# Patient Record
Sex: MALE | Race: WHITE | NOT HISPANIC OR LATINO | Employment: FULL TIME | URBAN - METROPOLITAN AREA
[De-identification: names, ages, dates, MRNs, and addresses within clinical notes are randomized per-mention and may not be internally consistent; named-entity substitution may affect disease eponyms.]

---

## 2017-01-04 ENCOUNTER — ANTICOAGULATION THERAPY VISIT (OUTPATIENT)
Dept: NURSING | Facility: CLINIC | Age: 62
End: 2017-01-04
Payer: COMMERCIAL

## 2017-01-04 DIAGNOSIS — Z79.01 LONG-TERM (CURRENT) USE OF ANTICOAGULANTS: Primary | ICD-10-CM

## 2017-01-04 DIAGNOSIS — I48.20 CHRONIC ATRIAL FIBRILLATION (H): ICD-10-CM

## 2017-01-04 LAB — INR POINT OF CARE: 1.7 (ref 0.86–1.14)

## 2017-01-04 PROCEDURE — 85610 PROTHROMBIN TIME: CPT | Mod: QW

## 2017-01-04 PROCEDURE — 99207 ZZC NO CHARGE NURSE ONLY: CPT

## 2017-01-04 PROCEDURE — 36416 COLLJ CAPILLARY BLOOD SPEC: CPT

## 2017-01-04 NOTE — MR AVS SNAPSHOT
Cameron Mariano   1/4/2017 7:15 AM   Anticoagulation Therapy Visit    Description:  61 year old male   Provider:  AN ANTI COAG   Department:  An Nurse           INR as of 1/4/2017     Selected INR 1.7! (1/4/2017)      Anticoagulation Summary as of 1/4/2017     INR goal 2.0-3.0   Selected INR 1.7! (1/4/2017)   Full instructions 1/4: 10 mg; Otherwise 7.5 mg every day   Next INR check 1/26/2017    Indications   Chronic atrial fibrillation (H) [I48.2]  Long-term (current) use of anticoagulants [Z79.01] [Z79.01]         Your next Anticoagulation Clinic appointment(s)     Jan 04, 2017  7:15 AM   Anticoagulation Visit with AN ANTI COAG   [facility] ([facility]    [address] [address] [phone]            Jan 26, 2017  7:15 AM   Anticoagulation Visit with AN ANTI COAG   [facility] ([facility])    [address] [address] [phone]              Contact Numbers     [facility]  Please call  [phone] to cancel and/or reschedule your appointment, or with any problems or questions regarding your therapy.        January 2017 Details    Sun Mon Tue Wed Thu Fri Sat     1               2               3               4      10 mg   See details      5      7.5 mg         6      7.5 mg         7      7.5 mg           8      7.5 mg         9      7.5 mg         10      7.5 mg         11      7.5 mg         12      7.5 mg         13      7.5 mg         14      7.5 mg           15      7.5 mg         16      7.5 mg         17      7.5 mg         18      7.5 mg         19      7.5 mg         20      7.5 mg         21      7.5 mg           22      7.5 mg         23      7.5 mg         24      7.5 mg         25      7.5 mg         26            27               28                 29               30               31                    Date Details   01/04 This INR check       Date of next INR:  1/26/2017         How to take  your warfarin dose     To take:  7.5 mg Take 1.5 of the 5 mg tablets.    To take:  10 mg Take 2 of the 5 mg tablets.

## 2017-01-04 NOTE — PROGRESS NOTES
ANTICOAGULATION FOLLOW-UP CLINIC VISIT    Patient Name:  Cameron Mariano  Date:  1/4/2017  Contact Type:  Face to Face    SUBJECTIVE:     Patient Findings     Positives Diet Changes    Comments Increased vit k foods in diet for wt loss and plans to continue. Dose adjusted today and weekly.           OBJECTIVE    INR PROTIME   Date Value Ref Range Status   01/04/2017 1.7* 0.86 - 1.14 Final       ASSESSMENT / PLAN  INR assessment SUB    Recheck INR In: 3 WEEKS    INR Location Clinic      Anticoagulation Summary as of 1/4/2017     INR goal 2.0-3.0   Selected INR 1.7! (1/4/2017)   Maintenance plan 7.5 mg (5 mg x 1.5) every day   Full instructions 1/4: 10 mg; Otherwise 7.5 mg every day   Weekly total 52.5 mg   Plan last modified Franchesca Jarvis RN (1/4/2017)   Next INR check 1/26/2017   Target end date     Indications   Chronic atrial fibrillation (H) [I48.2]  Long-term (current) use of anticoagulants [Z79.01] [Z79.01]         Anticoagulation Episode Summary     INR check location     Preferred lab     Send INR reminders to AN ANTICOAG CLINIC    Comments       Anticoagulation Care Providers     Provider Role Specialty Phone number    Pablo Benz MD [facility] [phone]            See the Encounter Report to view Anticoagulation Flowsheet and Dosing Calendar (Go to Encounters tab in chart review, and find the Anticoagulation Therapy Visit)        Franchesca Jarvis RN

## 2017-01-11 DIAGNOSIS — I10 HYPERTENSION GOAL BP (BLOOD PRESSURE) < 140/90: Primary | ICD-10-CM

## 2017-01-11 RX ORDER — DILTIAZEM HYDROCHLORIDE 240 MG/1
240 CAPSULE, COATED, EXTENDED RELEASE ORAL DAILY
Qty: 90 CAPSULE | Refills: 0 | Status: SHIPPED | OUTPATIENT
Start: 2017-01-11 | End: 2017-05-01

## 2017-01-11 RX ORDER — LISINOPRIL 2.5 MG/1
2.5 TABLET ORAL DAILY
Qty: 90 TABLET | Refills: 0 | Status: SHIPPED | OUTPATIENT
Start: 2017-01-11 | End: 2017-05-01

## 2017-01-26 ENCOUNTER — ANTICOAGULATION THERAPY VISIT (OUTPATIENT)
Dept: NURSING | Facility: CLINIC | Age: 62
End: 2017-01-26
Payer: COMMERCIAL

## 2017-01-26 DIAGNOSIS — I48.20 CHRONIC ATRIAL FIBRILLATION (H): ICD-10-CM

## 2017-01-26 DIAGNOSIS — Z79.01 LONG-TERM (CURRENT) USE OF ANTICOAGULANTS: Primary | ICD-10-CM

## 2017-01-26 LAB — INR POINT OF CARE: 4.9 (ref 0.86–1.14)

## 2017-01-26 PROCEDURE — 99207 ZZC NO CHARGE NURSE ONLY: CPT

## 2017-01-26 PROCEDURE — 36416 COLLJ CAPILLARY BLOOD SPEC: CPT

## 2017-01-26 PROCEDURE — 85610 PROTHROMBIN TIME: CPT | Mod: QW

## 2017-01-26 NOTE — MR AVS SNAPSHOT
Cameron Mariano   1/26/2017 7:15 AM   Anticoagulation Therapy Visit    Description:  61 year old male   Provider:  AN ANTI COAG   Department:  An Nurse           INR as of 1/26/2017     Selected INR 4.9! (1/26/2017)      Anticoagulation Summary as of 1/26/2017     INR goal 2.0-3.0   Selected INR 4.9! (1/26/2017)   Full instructions 1/27: Hold; 1/28: 5 mg; Otherwise 7.5 mg every day   Next INR check 2/2/2017    Indications   Chronic atrial fibrillation (H) [I48.2]  Long-term (current) use of anticoagulants [Z79.01] [Z79.01]         Your next Anticoagulation Clinic appointment(s)     Jan 26, 2017  7:15 AM   [facility] AN ANTI [facility]AG   [facility]    [address] [address] [phone]            Feb 02, 2017  7:15 AM   Anticoagulation Visit with AN ANTI COAG   [facility]    [address] [address] [phone]              Contact Numbers     [facility]  Please call  [phone] to cancel and/or reschedule your appointment, or with any problems or questions regarding your therapy.        January 2017 Details    Sun Mon Tue Wed Thu Fri Sat     1               2               3               4               5               6               7                 8               9               10               11               12               13               14                 15               16               17               18               19               20               21                 22               23               24               25               26      7.5 mg   See details      27      Hold         28      5 mg           29      7.5 mg         30      7.5 mg         31      7.5 mg              Date Details   01/26 This INR check               How to take your warfarin dose     To take:  5 mg Take 1 of the 5 mg tablets.    To take:  7.5 mg Take 1.5 of the 5 mg  tablets.    Hold Do not take your warfarin dose. See the Details table to the right for additional instructions.                February 2017 Details    Sun Mon Tue Wed Thu Fri Sat        1      7.5 mg         2            3               4                 5               6               7               8               9               10               11                 12               13               14               15               16               17               18                 19               20               21               22               23               24               25                 26               27               28                    Date Details   No additional details    Date of next INR:  2/2/2017         How to take your warfarin dose     To take:  7.5 mg Take 1.5 of the 5 mg tablets.

## 2017-01-26 NOTE — PROGRESS NOTES
ANTICOAGULATION FOLLOW-UP CLINIC VISIT    Patient Name:  Cameron Mariano  Date:  1/26/2017  Contact Type:  Face to Face    SUBJECTIVE:     Patient Findings     Positives Diet Changes, Other Complaints    Comments INR 4.9. URI cold symptoms and taking Nyquil . ot eating much. Sinus drainage. No bleeding or bruising. Took warfarin dose this morning. Will hold dose tomorrow. Add vit k food to diet today. Cut back on Nyquil. Call if concerns. Recheck in 1 wk.           OBJECTIVE    INR PROTIME   Date Value Ref Range Status   01/26/2017 4.9* 0.86 - 1.14 Final       ASSESSMENT / PLAN  INR assessment SUPRA    Recheck INR In: 1 WEEK    INR Location Clinic      Anticoagulation Summary as of 1/26/2017     INR goal 2.0-3.0   Selected INR 4.9! (1/26/2017)   Maintenance plan 7.5 mg (5 mg x 1.5) every day   Full instructions 1/27: Hold; 1/28: 5 mg; Otherwise 7.5 mg every day   Weekly total 52.5 mg   Plan last modified Franchesca Jarvis RN (1/4/2017)   Next INR check 2/2/2017   Target end date     Indications   Chronic atrial fibrillation (H) [I48.2]  Long-term (current) use of anticoagulants [Z79.01] [Z79.01]         Anticoagulation Episode Summary     INR check location     Preferred lab     Send INR reminders to AN ANTICOAG CLINIC    Comments       Anticoagulation Care Providers     Provider Role Specialty Phone number    Pablo Benz MD [facility] [phone]            See the Encounter Report to view Anticoagulation Flowsheet and Dosing Calendar (Go to Encounters tab in chart review, and find the Anticoagulation Therapy Visit)        Franchesca Jarvis RN

## 2017-02-02 ENCOUNTER — ANTICOAGULATION THERAPY VISIT (OUTPATIENT)
Dept: NURSING | Facility: CLINIC | Age: 62
End: 2017-02-02
Payer: COMMERCIAL

## 2017-02-02 ENCOUNTER — TELEPHONE (OUTPATIENT)
Dept: FAMILY MEDICINE | Facility: CLINIC | Age: 62
End: 2017-02-02

## 2017-02-02 DIAGNOSIS — I48.20 CHRONIC ATRIAL FIBRILLATION (H): Primary | ICD-10-CM

## 2017-02-02 DIAGNOSIS — I48.20 CHRONIC ATRIAL FIBRILLATION (H): ICD-10-CM

## 2017-02-02 DIAGNOSIS — Z79.01 LONG-TERM (CURRENT) USE OF ANTICOAGULANTS: Primary | ICD-10-CM

## 2017-02-02 LAB — INR POINT OF CARE: 2.8 (ref 0.86–1.14)

## 2017-02-02 PROCEDURE — 85610 PROTHROMBIN TIME: CPT | Mod: QW

## 2017-02-02 PROCEDURE — 36416 COLLJ CAPILLARY BLOOD SPEC: CPT

## 2017-02-02 PROCEDURE — 99207 ZZC NO CHARGE NURSE ONLY: CPT

## 2017-02-02 NOTE — MR AVS SNAPSHOT
Cameron Mariano   2/2/2017 7:15 AM   Anticoagulation Therapy Visit    Description:  61 year old male   Provider:  AN ANTI COAG   Department:  An Nurse           INR as of 2/2/2017     Selected INR 2.8 (2/2/2017)      Anticoagulation Summary as of 2/2/2017     INR goal 2.0-3.0   Selected INR 2.8 (2/2/2017)   Full instructions 7.5 mg every day   Next INR check 3/2/2017    Indications   Chronic atrial fibrillation (H) [I48.2]  Long-term (current) use of anticoagulants [Z79.01] [Z79.01]         Your next Anticoagulation Clinic appointment(s)     Feb 02, 2017  7:15 AM   Anticoagulation Visit with AN ANTI COAG   [facility]    [address] [address] [phone]            Mar 02, 2017  7:15 AM   Anticoagulation Visit with AN ANTI COAG   [facility])    [address] [address] [phone]              Contact Numbers     [facility]  Please call  [phone] to cancel and/or reschedule your appointment, or with any problems or questions regarding your therapy.        February 2017 Details    Sun Mon Tue Wed Thu Fri Sat        1               2      7.5 mg   See details      3      7.5 mg         4      7.5 mg           5      7.5 mg         6      7.5 mg         7      7.5 mg         8      7.5 mg         9      7.5 mg         10      7.5 mg         11      7.5 mg           12      7.5 mg         13      7.5 mg         14      7.5 mg         15      7.5 mg         16      7.5 mg         17      7.5 mg         18      7.5 mg           19      7.5 mg         20      7.5 mg         21      7.5 mg         22      7.5 mg         23      7.5 mg         24      7.5 mg         25      7.5 mg           26      7.5 mg         27      7.5 mg         28      7.5 mg              Date Details   02/02 This INR check               How to take your warfarin dose     To take:  7.5 mg Take 1.5 of the 5 mg  tablets.           March 2017 Details    Sun Mon Tue Wed Thu Fri Sat        1      7.5 mg         2            3               4                 5               6               7               8               9               10               11                 12               13               14               15               16               17               18                 19               20               21               22               23               24               25                 26               27               28               29               30               31                 Date Details   No additional details    Date of next INR:  3/2/2017         How to take your warfarin dose     To take:  7.5 mg Take 1.5 of the 5 mg tablets.

## 2017-02-02 NOTE — PROGRESS NOTES
ANTICOAGULATION FOLLOW-UP CLINIC VISIT    Patient Name:  Cameron Mariano  Date:  2/2/2017  Contact Type:  Face to Face    SUBJECTIVE:     Patient Findings     Comments Therapeutic after dose adjusted from last week. Cold symptoms gone and stopped taking nyquil. Resume previous weekly dose.            OBJECTIVE    INR PROTIME   Date Value Ref Range Status   02/02/2017 2.8* 0.86 - 1.14 Final       ASSESSMENT / PLAN  INR assessment THER    Recheck INR In: 4 WEEKS    INR Location Clinic      Anticoagulation Summary as of 2/2/2017     INR goal 2.0-3.0   Selected INR 2.8 (2/2/2017)   Maintenance plan 7.5 mg (5 mg x 1.5) every day   Full instructions 7.5 mg every day   Weekly total 52.5 mg   No change documented Franchesca Jarvis RN   Plan last modified Franchesca Jarvis RN (1/4/2017)   Next INR check 3/2/2017   Target end date     Indications   Chronic atrial fibrillation (H) [I48.2]  Long-term (current) use of anticoagulants [Z79.01] [Z79.01]         Anticoagulation Episode Summary     INR check location     Preferred lab     Send INR reminders to AN ANTICOAG CLINIC    Comments       Anticoagulation Care Providers     Provider Role Specialty Phone number    Pablo Benz MD [facility] [phone]            See the Encounter Report to view Anticoagulation Flowsheet and Dosing Calendar (Go to Encounters tab in chart review, and find the Anticoagulation Therapy Visit)        Franchesca Jarvis RN

## 2017-02-07 ENCOUNTER — OFFICE VISIT (OUTPATIENT)
Dept: FAMILY MEDICINE | Facility: CLINIC | Age: 62
End: 2017-02-07
Payer: COMMERCIAL

## 2017-02-07 VITALS
BODY MASS INDEX: 26.83 KG/M2 | RESPIRATION RATE: 15 BRPM | DIASTOLIC BLOOD PRESSURE: 70 MMHG | WEIGHT: 187 LBS | SYSTOLIC BLOOD PRESSURE: 111 MMHG | OXYGEN SATURATION: 98 % | TEMPERATURE: 98.1 F | HEART RATE: 87 BPM

## 2017-02-07 DIAGNOSIS — J01.90 ACUTE SINUSITIS WITH SYMPTOMS > 10 DAYS: Primary | ICD-10-CM

## 2017-02-07 PROCEDURE — 99213 OFFICE O/P EST LOW 20 MIN: CPT | Performed by: NURSE PRACTITIONER

## 2017-02-07 ASSESSMENT — PAIN SCALES - GENERAL: PAINLEVEL: MILD PAIN (3)

## 2017-02-07 NOTE — PROGRESS NOTES
SUBJECTIVE:                                                    Cameron Mariano is a 61 year old male who presents to clinic today for the following health issues:      RESPIRATORY SYMPTOMS      Duration: 10 days    Description  nasal congestion, rhinorrhea, facial pain/pressure, cough and post nasal drainage     Severity: moderate    Accompanying signs and symptoms: None    History (predisposing factors):  none    Precipitating or alleviating factors: None    Therapies tried and outcome:  Nyquil           Problem list and histories reviewed & adjusted, as indicated.  Additional history: as documented    Problem list, Medication list, Allergies, and Medical/Social/Surgical histories reviewed in EPIC and updated as appropriate.    ROS:  Constitutional, HEENT, cardiovascular, pulmonary, gi and gu systems are negative, except as otherwise noted.    OBJECTIVE:                                                    /70 mmHg  Pulse 87  Temp(Src) 98.1  F (36.7  C) (Oral)  Resp 15  Wt 187 lb (84.823 kg)  SpO2 98%  Body mass index is 26.83 kg/(m^2).  GENERAL: healthy, alert and no distress  EYES: Eyes grossly normal to inspection, PERRL and conjunctivae and sclerae normal  HENT: normal cephalic/atraumatic, ear canals and TM's normal, nasal mucosa edematous , rhinorrhea yellow, oropharynx clear, oral mucous membranes moist and sinuses: maxillary tenderness on bilaterally  NECK: no adenopathy, no asymmetry, masses, or scars and thyroid normal to palpation  RESP: lungs clear to auscultation - no rales, rhonchi or wheezes  CV: regular rate and rhythm, normal S1 S2, no S3 or S4, no murmur, click or rub, no peripheral edema and peripheral pulses strong  ABDOMEN: soft, nontender, no hepatosplenomegaly, no masses and bowel sounds normal  MS: no gross musculoskeletal defects noted, no edema  SKIN: no suspicious lesions or rashes  NEURO: Normal strength and tone, mentation intact and speech normal  PSYCH: mentation appears  normal, affect normal/bright    Diagnostic Test Results:  none      ASSESSMENT/PLAN:                                                      1. Acute sinusitis with symptoms > 10 days    - amoxicillin-clavulanate (AUGMENTIN) 875-125 MG per tablet; Take 1 tablet by mouth 2 times daily  Dispense: 20 tablet; Refill: 0    See Patient Instructions  Patient Instructions       Acute Bacterial Rhinosinusitis (ABRS)  Acute bacterial rhinosinusitis (ABRS) is an infection of your nasal cavity and sinuses. It s caused by bacteria. Acute means that you ve had symptoms for less than 12 weeks.  Understanding your sinuses  The nasal cavity is the large air-filled space behind your nose. The sinuses are a group of spaces formed by the bones of your face. They connect with your nasal cavity. ABRS causes the tissue lining these spaces to become inflamed. Mucus may not drain normally. This leads to facial pain and other symptoms.  What causes ABRS?  ABRS most often follows an upper respiratory infection caused by a virus. Bacteria then infect the lining of your nasal cavity and sinuses. But you can also get ABRS if you have:    Nasal allergies    Long-term nasal swelling and congestion not caused by allergies    Blockage in the nose  Symptoms of ABRS  The symptoms of ABRS may be different for each person, and can include:    Nasal congestion    Runny nose    Fluid draining from the nose down the throat (postnasal drip)    Headache    Cough    Pain in the sinuses    Thick, colored fluid from the nose (mucus)    Fever  Diagnosing ABRS  ABRS may be diagnosed if you ve had an upper respiratory infection like a cold and cough for longer than 10 to 14 days. Your health care provider will ask about your symptoms and your medical history. The provider will check your vital signs, including your temperature. You ll have a physical exam. The health care provider will check your ears, nose, and throat. You likely won t need any tests. If ABRS comes  back, you may have a culture or other tests.  Treatment for ABRS  Treatment may include:    Antibiotic medicine. This is for symptoms that last for at least 10 to 14 days.    Nasal corticosteroid medicine. Drops or spray used in the nose can lessen swelling and congestion.    Over-the-counter pain medicine. This is to lessen sinus pain and pressure.    Nasal decongestant medicine. Spray or drops may help to lessen congestion. Do not use them for more than a few days.    Salt wash (saline irrigation). This can help to loosen mucus.  Possible complications of ABRS  ABRS may come back or become long-term (chronic).  In rare cases, ABRS may cause complications such as:     Inflamed tissue around the brain and spinal cord (meningitis)    Inflamed tissue around the eyes (orbital cellulitis)    Inflamed bones around the sinuses (osteitis)  These problems may need to be treated in a hospital with intravenous (IV) antibiotic medicine or surgery.  When to call the health care provider  Call your health care provider if you have any of the following:    Symptoms that don t get better, or get worse    Symptoms that don t get better after 3 to 5 days on antibiotics    Trouble seeing    Swelling around your eyes    Confusion or trouble staying awake        [id number] [employer]. [address] [place] [address], [place] [address]. All rights reserved. This information is not intended as a substitute for professional medical care. Always follow your healthcare professional's instructions.              TRINO Yoder [facility]

## 2017-02-07 NOTE — MR AVS SNAPSHOT
After Visit Summary   2/7/2017    Cameron Mariano    MRN: [id number]           Patient Information     Date Of Birth          1955        Visit Information        Provider Department      2/7/2017 3:20 PM Sonia Kumar APRN [facility]        Today's Diagnoses     Acute sinusitis with symptoms > 10 days    -  1       Care Instructions      Acute Bacterial Rhinosinusitis (ABRS)  Acute bacterial rhinosinusitis (ABRS) is an infection of your nasal cavity and sinuses. It s caused by bacteria. Acute means that you ve had symptoms for less than 12 weeks.  Understanding your sinuses  The nasal cavity is the large air-filled space behind your nose. The sinuses are a group of spaces formed by the bones of your face. They connect with your nasal cavity. ABRS causes the tissue lining these spaces to become inflamed. Mucus may not drain normally. This leads to facial pain and other symptoms.  What causes ABRS?  ABRS most often follows an upper respiratory infection caused by a virus. Bacteria then infect the lining of your nasal cavity and sinuses. But you can also get ABRS if you have:    Nasal allergies    Long-term nasal swelling and congestion not caused by allergies    Blockage in the nose  Symptoms of ABRS  The symptoms of ABRS may be different for each person, and can include:    Nasal congestion    Runny nose    Fluid draining from the nose down the throat (postnasal drip)    Headache    Cough    Pain in the sinuses    Thick, colored fluid from the nose (mucus)    Fever  Diagnosing ABRS  ABRS may be diagnosed if you ve had an upper respiratory infection like a cold and cough for longer than 10 to 14 days. Your health care provider will ask about your symptoms and your medical history. The provider will check your vital signs, including your temperature. You ll have a physical exam. The health care provider will check your ears, nose, and throat. You likely won t need any tests. If  ABRS comes back, you may have a culture or other tests.  Treatment for ABRS  Treatment may include:    Antibiotic medicine. This is for symptoms that last for at least 10 to 14 days.    Nasal corticosteroid medicine. Drops or spray used in the nose can lessen swelling and congestion.    Over-the-counter pain medicine. This is to lessen sinus pain and pressure.    Nasal decongestant medicine. Spray or drops may help to lessen congestion. Do not use them for more than a few days.    Salt wash (saline irrigation). This can help to loosen mucus.  Possible complications of ABRS  ABRS may come back or become long-term (chronic).  In rare cases, ABRS may cause complications such as:     Inflamed tissue around the brain and spinal cord (meningitis)    Inflamed tissue around the eyes (orbital cellulitis)    Inflamed bones around the sinuses (osteitis)  These problems may need to be treated in a hospital with intravenous (IV) antibiotic medicine or surgery.  When to call the health care provider  Call your health care provider if you have any of the following:    Symptoms that don t get better, or get worse    Symptoms that don t get better after 3 to 5 days on antibiotics    Trouble seeing    Swelling around your eyes    Confusion or trouble staying awake        8813-7457 [employer]. [address] [place] [address]. All rights reserved. This information is not intended as a substitute for professional medical care. Always follow your healthcare professional's instructions.              Follow-ups after your visit        Your next 10 appointments already scheduled     Mar 02, 2017  7:15 AM   Anticoagulation Visit with AN ANTI COAG   [facility] ([facility]    [address] [facility] [address] [phone]              Who to contact     If you have questions or need follow up information about today's clinic visit or your schedule please contact [facility]  [place] directly at [phone].  Normal or non-critical lab and imaging results will be communicated to you by [employer], letter or phone within 4 business days after the clinic has received the results. If you do not hear from us within 7 days, please contact the clinic through [employer] or phone. If you have a critical or abnormal lab result, we will notify you by phone as soon as possible.  Submit refill requests through [employer] or call your pharmacy and they will forward the refill request to us. Please allow 3 business days for your refill to be completed.          Additional Information About Your Visit        [employer] Information     [employer] gives you secure access to your electronic health record. If you see a primary care provider, you can also send messages to your care team and make appointments. If you have questions, please call your primary care clinic.  If you do not have a primary care provider, please call [phone] and they will assist you.        Care EveryWhere ID     This is your Care EveryWhere ID. This could be used by other organizations to access your [facility] medical records  [id number]        Your Vitals Were     Pulse Temperature Respirations Pulse Oximetry          87 98.1  F (36.7  C) (Oral) 15 98%         Blood Pressure from Last 3 Encounters:   02/07/17 111/70   09/08/16 134/82   12/14/15 118/78    Weight from Last 3 Encounters:   02/07/17 187 lb (84.823 kg)   09/08/16 183 lb (83.008 kg)   12/14/15 185 lb (83.915 kg)              Today, you had the following     No orders found for display         Today's Medication Changes          These changes are accurate as of: 2/7/17  3:46 PM.  If you have any questions, ask your nurse or doctor.               Start taking these medicines.        Dose/Directions    amoxicillin-clavulanate 875-125 MG per tablet   Commonly known as:  AUGMENTIN   Used for:  Acute sinusitis with symptoms > 10 days   Started by:  Sonia Kumar APRN CNP         Dose:  1 tablet   Take 1 tablet by mouth 2 times daily   Quantity:  20 tablet   Refills:  0            Where to get your medicines      These medications were sent to [facility] - [place], [place] - [address] [facility]  [address] [facility] [address]     Phone:  [phone]    - amoxicillin-clavulanate 875-125 MG per tablet             Primary Care Provider Office Phone # Fax #    Pablo Benz [phone]       [facility] [address] [facility] [address]        Thank you!     Thank you for choosing [facility]  for your care. Our goal is always to provide you with excellent care. Hearing back from our patients is one way we can continue to improve our services. Please take a few minutes to complete the written survey that you may receive in the mail after your visit with us. Thank you!             Your Updated Medication List - Protect others around you: Learn how to safely use, store and throw away your medicines at www.disposemymeds.org.          This list is accurate as of: 2/7/17  3:46 PM.  Always use your most recent med list.                   Brand Name Dispense Instructions for use    amoxicillin-clavulanate 875-125 MG per tablet    AUGMENTIN    20 tablet    Take 1 tablet by mouth 2 times daily       aspirin 81 MG tablet      Take 1 tablet by mouth daily.       atorvastatin 40 MG tablet    LIPITOR    90 tablet    Take 1 tablet (40 mg) by mouth daily for cholesterol Pharmacy ok to hold prescription until due       diltiazem 240 MG 24 hr capsule    CARDIZEM CD    90 capsule    Take 1 capsule (240 mg) by mouth daily For blood pressure Pharmacy ok to hold prescription until due       fish oil-omega-3 fatty acids 1000 MG capsule      Take 2 g by mouth daily.       lisinopril 2.5 MG tablet    PRINIVIL/Zestril    90 tablet    Take 1 tablet (2.5 mg) by mouth daily For blood pressure Pharmacy ok to hold prescription until due       metoprolol  100 MG 24 hr tablet    TOPROL-XL    90 tablet    Take 1 tablet (100 mg) by mouth daily Take at bedtime Pharmacy ok to hold prescription until due       Multi-vitamin Tabs tablet   Generic drug:  multivitamin, therapeutic with minerals      1 TABLET DAILY       sildenafil 20 MG tablet    REVATIO/VIAGRA    20 tablet    1-2 tabs daily as needed for ed.  Never use with nitroglycerin, terazosin or doxazosin.       vitamin D 400 UNITS capsule      Take 1 capsule by mouth daily.       warfarin 5 MG tablet    COUMADIN    120 tablet    daily as directed. Currently 5 mg sunday and 7.5 mg rest of week days

## 2017-02-07 NOTE — NURSING NOTE
"Chief Complaint   Patient presents with     URI     c/o congestion, sinus pressure, and post nasal drainage       Initial /70 mmHg  Pulse 87  Temp(Src) 98.1  F (36.7  C) (Oral)  Resp 15  Wt 187 lb (84.823 kg)  SpO2 98% Estimated body mass index is 26.83 kg/(m^2) as calculated from the following:    Height as of 9/8/16: 5' 10\" (1.778 m).    Weight as of this encounter: 187 lb (84.823 kg).  Medication Reconciliation: complete     Josefina Lee MA      "

## 2017-03-02 ENCOUNTER — ANTICOAGULATION THERAPY VISIT (OUTPATIENT)
Dept: NURSING | Facility: CLINIC | Age: 62
End: 2017-03-02
Payer: COMMERCIAL

## 2017-03-02 DIAGNOSIS — I48.20 CHRONIC ATRIAL FIBRILLATION (H): ICD-10-CM

## 2017-03-02 DIAGNOSIS — Z79.01 LONG-TERM (CURRENT) USE OF ANTICOAGULANTS: ICD-10-CM

## 2017-03-02 LAB — INR POINT OF CARE: 3.5 (ref 0.86–1.14)

## 2017-03-02 PROCEDURE — 36416 COLLJ CAPILLARY BLOOD SPEC: CPT

## 2017-03-02 PROCEDURE — 85610 PROTHROMBIN TIME: CPT | Mod: QW

## 2017-03-02 PROCEDURE — 99207 ZZC NO CHARGE NURSE ONLY: CPT

## 2017-03-02 NOTE — PROGRESS NOTES
ANTICOAGULATION FOLLOW-UP CLINIC VISIT    Patient Name:  Cameron Mariano  Date:  3/2/2017  Contact Type:  Face to Face    SUBJECTIVE:     Patient Findings     Positives Antibiotic use or infection    Comments Finished 10 days of augmentin for sinusitis 3 days ago. No bleeding concerns. This is probable cause of supra result. No other changes. Took today's dose so will adjust dose tomorrow.            OBJECTIVE    INR Protime   Date Value Ref Range Status   03/02/2017 3.5 (A) 0.86 - 1.14 Final       ASSESSMENT / PLAN  INR assessment SUPRA    Recheck INR In: 4 WEEKS    INR Location Clinic      Anticoagulation Summary as of 3/2/2017     INR goal 2.0-3.0   Today's INR 3.5!   Maintenance plan 7.5 mg (5 mg x 1.5) every day   Full instructions 3/3: 2.5 mg; Otherwise 7.5 mg every day   Weekly total 52.5 mg   Plan last modified Franchesca Jarvis RN (1/4/2017)   Next INR check 3/29/2017   Target end date     Indications   Chronic atrial fibrillation (H) [I48.2]  Long-term (current) use of anticoagulants [Z79.01] [Z79.01]         Anticoagulation Episode Summary     INR check location     Preferred lab     Send INR reminders to AN ANTICOAG CLINIC    Comments       Anticoagulation Care Providers     Provider Role Specialty Phone number    Pablo Benz MD [facility] [phone]            See the Encounter Report to view Anticoagulation Flowsheet and Dosing Calendar (Go to Encounters tab in chart review, and find the Anticoagulation Therapy Visit)        Franchesca Jarvis RN

## 2017-03-02 NOTE — MR AVS SNAPSHOT
Cameron Mariano   3/2/2017 7:15 AM   Anticoagulation Therapy Visit    Description:  61 year old male   Provider:  AN ANTI COAG   Department:  An Nurse           INR as of 3/2/2017     Today's INR 3.5!      Anticoagulation Summary as of 3/2/2017     INR goal 2.0-3.0   Today's INR 3.5!   Full instructions 3/3: 2.5 mg; Otherwise 7.5 mg every day   Next INR check 3/29/2017    Indications   Chronic atrial fibrillation (H) [I48.2]  Long-term (current) use of anticoagulants [Z79.01] [Z79.01]         Your next Anticoagulation Clinic appointment(s)     Mar 29, 2017  7:15 AM CDT   Anticoagulation Visit with AN ANTI COAG   [facility]    [address] [facility] [address] [phone]              Contact Numbers     [facility]  Please call  [phone] to cancel and/or reschedule your appointment, or with any problems or questions regarding your therapy.        March 2017 Details    Sun Mon Tue Wed Thu Fri Sat        1               2      7.5 mg   See details      3      2.5 mg         4      7.5 mg           5      7.5 mg         6      7.5 mg         7      7.5 mg         8      7.5 mg         9      7.5 mg         10      7.5 mg         11      7.5 mg           12      7.5 mg         13      7.5 mg         14      7.5 mg         15      7.5 mg         16      7.5 mg         17      7.5 mg         18      7.5 mg           19      7.5 mg         20      7.5 mg         21      7.5 mg         22      7.5 mg         23      7.5 mg         24      7.5 mg         25      7.5 mg           26      7.5 mg         27      7.5 mg         28      7.5 mg         29            30               31                 Date Details   03/02 This INR check       Date of next INR:  3/29/2017         How to take your warfarin dose     To take:  2.5 mg Take 0.5 of a 5 mg tablet.    To take:  7.5 mg Take 1.5 of the 5 mg tablets.

## 2017-03-29 ENCOUNTER — ANTICOAGULATION THERAPY VISIT (OUTPATIENT)
Dept: NURSING | Facility: CLINIC | Age: 62
End: 2017-03-29
Payer: COMMERCIAL

## 2017-03-29 DIAGNOSIS — I48.20 CHRONIC ATRIAL FIBRILLATION (H): ICD-10-CM

## 2017-03-29 DIAGNOSIS — Z79.01 LONG-TERM (CURRENT) USE OF ANTICOAGULANTS: ICD-10-CM

## 2017-03-29 LAB — INR POINT OF CARE: 2.6 (ref 0.86–1.14)

## 2017-03-29 PROCEDURE — 85610 PROTHROMBIN TIME: CPT | Mod: QW

## 2017-03-29 PROCEDURE — 36416 COLLJ CAPILLARY BLOOD SPEC: CPT

## 2017-03-29 PROCEDURE — 99207 ZZC NO CHARGE NURSE ONLY: CPT

## 2017-03-29 NOTE — MR AVS SNAPSHOT
Cameron Mariano   3/29/2017 7:15 AM   Anticoagulation Therapy Visit    Description:  61 year old male   Provider:  AN ANTI COAG   Department:  An Nurse           INR as of 3/29/2017     Today's INR 2.6      Anticoagulation Summary as of 3/29/2017     INR goal 2.0-3.0   Today's INR 2.6   Full instructions 7.5 mg every day   Next INR check 4/27/2017    Indications   Chronic atrial fibrillation (H) [I48.2]  Long-term (current) use of anticoagulants [Z79.01] [Z79.01]         Your next Anticoagulation Clinic appointment(s)     Apr 27, 2017  7:15 AM CDT   Anticoagulation Visit with AN ANTI COAG   [facility]    [address] [facility] [address] [phone]              Contact Numbers     [facility]  Please call  [phone] to cancel and/or reschedule your appointment, or with any problems or questions regarding your therapy.        March 2017 Details    Sun Mon Tue Wed Thu Fri Sat        1               2               3               4                 5               6               7               8               9               10               11                 12               13               14               15               16               17               18                 19               20               21               22               23               24               25                 26               27               28               29      7.5 mg   See details      30      7.5 mg         31      7.5 mg           Date Details   03/29 This INR check               How to take your warfarin dose     To take:  7.5 mg Take 1.5 of the 5 mg tablets.           April 2017 Details    Sun Mon Tue Wed Thu Fri Sat           1      7.5 mg           2      7.5 mg         3      7.5 mg         4      7.5 mg         5      7.5 mg         6      7.5 mg         7      7.5 mg         8      7.5 mg           9      7.5 mg         10      7.5 mg         11       7.5 mg         12      7.5 mg         13      7.5 mg         14      7.5 mg         15      7.5 mg           16      7.5 mg         17      7.5 mg         18      7.5 mg         19      7.5 mg         20      7.5 mg         21      7.5 mg         22      7.5 mg           23      7.5 mg         24      7.5 mg         25      7.5 mg         26      7.5 mg         27            28               29                 30                      Date Details   No additional details    Date of next INR:  4/27/2017         How to take your warfarin dose     To take:  7.5 mg Take 1.5 of the 5 mg tablets.

## 2017-03-29 NOTE — PROGRESS NOTES
ANTICOAGULATION FOLLOW-UP CLINIC VISIT    Patient Name:  Cameron Mariano  Date:  3/29/2017  Contact Type:  Face to Face    SUBJECTIVE:        OBJECTIVE    INR Protime   Date Value Ref Range Status   03/29/2017 2.6 (A) 0.86 - 1.14 Final       ASSESSMENT / PLAN  INR assessment THER    Recheck INR In: 4 WEEKS    INR Location Clinic    Billed home INR No    Vit K given? None      Anticoagulation Summary as of 3/29/2017     INR goal 2.0-3.0   Today's INR 2.6   Maintenance plan 7.5 mg (5 mg x 1.5) every day   Full instructions 7.5 mg every day   Weekly total 52.5 mg   No change documented Barbara Berman RN   Plan last modified Franchesca Jarvis RN (1/4/2017)   Next INR check 4/27/2017   Target end date     Indications   Chronic atrial fibrillation (H) [I48.2]  Long-term (current) use of anticoagulants [Z79.01] [Z79.01]         Anticoagulation Episode Summary     INR check location     Preferred lab     Send INR reminders to AN ANTICOAG CLINIC    Comments       Anticoagulation Care Providers     Provider Role Specialty Phone number    Pablo Benz MD [facility] [phone]            See the Encounter Report to view Anticoagulation Flowsheet and Dosing Calendar (Go to Encounters tab in chart review, and find the Anticoagulation Therapy Visit)      Barbara Berman, ESTEBAN

## 2017-04-27 ENCOUNTER — ANTICOAGULATION THERAPY VISIT (OUTPATIENT)
Dept: NURSING | Facility: CLINIC | Age: 62
End: 2017-04-27
Payer: COMMERCIAL

## 2017-04-27 DIAGNOSIS — Z79.01 LONG-TERM (CURRENT) USE OF ANTICOAGULANTS: ICD-10-CM

## 2017-04-27 DIAGNOSIS — I48.20 CHRONIC ATRIAL FIBRILLATION (H): ICD-10-CM

## 2017-04-27 LAB — INR POINT OF CARE: 3 (ref 0.86–1.14)

## 2017-04-27 PROCEDURE — 36416 COLLJ CAPILLARY BLOOD SPEC: CPT

## 2017-04-27 PROCEDURE — 99207 ZZC NO CHARGE NURSE ONLY: CPT

## 2017-04-27 PROCEDURE — 85610 PROTHROMBIN TIME: CPT | Mod: QW

## 2017-04-27 NOTE — MR AVS SNAPSHOT
Cameron Mariano   4/27/2017 7:15 AM   Anticoagulation Therapy Visit    Description:  61 year old male   Provider:  AN ANTI COAG   Department:  An Nurse           INR as of 4/27/2017     Today's INR 3.0      Anticoagulation Summary as of 4/27/2017     INR goal 2.0-3.0   Today's INR 3.0   Full instructions 7.5 mg every day   Next INR check 5/25/2017    Indications   Chronic atrial fibrillation (H) [I48.2]  Long-term (current) use of anticoagulants [Z79.01] [Z79.01]         Your next Anticoagulation Clinic appointment(s)     May 25, 2017  7:15 AM CDT   Anticoagulation Visit with AN ANTI COAG   [facility]    [address] [facility] [address] [phone]              Contact Numbers     [facility]  Please call  [phone] to cancel and/or reschedule your appointment, or with any problems or questions regarding your therapy.        April 2017 Details    Sun Mon Tue Wed Thu Fri Sat           1                 2               3               4               5               6               7               8                 9               10               11               12               13               14               15                 16               17               18               19               20               21               22                 23               24               25               26               27      7.5 mg   See details      28      7.5 mg         29      7.5 mg           30      7.5 mg                Date Details   04/27 This INR check               How to take your warfarin dose     To take:  7.5 mg Take 1.5 of the 5 mg tablets.           May 2017 Details    Sun Mon Tue Wed Thu Fri Sat      1      7.5 mg         2      7.5 mg         3      7.5 mg         4      7.5 mg         5      7.5 mg         6      7.5 mg           7      7.5 mg         8      7.5 mg         9      7.5 mg         10      7.5 mg         11      7.5 mg          12      7.5 mg         13      7.5 mg           14      7.5 mg         15      7.5 mg         16      7.5 mg         17      7.5 mg         18      7.5 mg         19      7.5 mg         20      7.5 mg           21      7.5 mg         22      7.5 mg         23      7.5 mg         24      7.5 mg         25            26               27                 28               29               30               31                   Date Details   No additional details    Date of next INR:  5/25/2017         How to take your warfarin dose     To take:  7.5 mg Take 1.5 of the 5 mg tablets.

## 2017-04-27 NOTE — PROGRESS NOTES
ANTICOAGULATION FOLLOW-UP CLINIC VISIT    Patient Name:  Cameron Mariano  Date:  4/27/2017  Contact Type:  Face to Face    SUBJECTIVE:     Patient Findings     Positives No Problem Findings           OBJECTIVE    INR Protime   Date Value Ref Range Status   04/27/2017 3.0 (A) 0.86 - 1.14 Final       ASSESSMENT / PLAN  INR assessment THER    Recheck INR In: 4 WEEKS    INR Location Clinic      Anticoagulation Summary as of 4/27/2017     INR goal 2.0-3.0   Today's INR 3.0   Maintenance plan 7.5 mg (5 mg x 1.5) every day   Full instructions 7.5 mg every day   Weekly total 52.5 mg   No change documented Franchesca Jarvis RN   Plan last modified Franchesca Jarvis RN (1/4/2017)   Next INR check 5/25/2017   Target end date     Indications   Chronic atrial fibrillation (H) [I48.2]  Long-term (current) use of anticoagulants [Z79.01] [Z79.01]         Anticoagulation Episode Summary     INR check location     Preferred lab     Send INR reminders to AN ANTICOAG CLINIC    Comments       Anticoagulation Care Providers     Provider Role Specialty Phone number    Pablo Benz MD [facility] [phone]            See the Encounter Report to view Anticoagulation Flowsheet and Dosing Calendar (Go to Encounters tab in chart review, and find the Anticoagulation Therapy Visit)        Franchesca Jarvis RN

## 2017-05-25 ENCOUNTER — ANTICOAGULATION THERAPY VISIT (OUTPATIENT)
Dept: NURSING | Facility: CLINIC | Age: 62
End: 2017-05-25
Payer: COMMERCIAL

## 2017-05-25 DIAGNOSIS — I48.20 CHRONIC ATRIAL FIBRILLATION (H): ICD-10-CM

## 2017-05-25 DIAGNOSIS — Z79.01 LONG-TERM (CURRENT) USE OF ANTICOAGULANTS: ICD-10-CM

## 2017-05-25 LAB — INR POINT OF CARE: 1.8 (ref 0.86–1.14)

## 2017-05-25 PROCEDURE — 99207 ZZC NO CHARGE NURSE ONLY: CPT

## 2017-05-25 PROCEDURE — 36416 COLLJ CAPILLARY BLOOD SPEC: CPT

## 2017-05-25 PROCEDURE — 85610 PROTHROMBIN TIME: CPT | Mod: QW

## 2017-05-25 NOTE — MR AVS SNAPSHOT
Cameron Mariano   5/25/2017 7:15 AM   Anticoagulation Therapy Visit    Description:  61 year old male   Provider:  AN ANTI COAG   Department:  An Nurse           INR as of 5/25/2017     Today's INR 1.8!      Anticoagulation Summary as of 5/25/2017     INR goal 2.0-3.0   Today's INR 1.8!   Full instructions 10 mg on Mon, Thu; 7.5 mg all other days   Next INR check 6/22/2017    Indications   Chronic atrial fibrillation (H) [I48.2]  Long-term (current) use of anticoagulants [Z79.01] [Z79.01]         Your next Anticoagulation Clinic appointment(s)     Jun 22, 2017  7:15 AM CDT   Anticoagulation Visit with AN ANTI COAG   [facility]    [address] [facility] [address] [phone]              Contact Numbers     [facility]  Please call  [phone] to cancel and/or reschedule your appointment, or with any problems or questions regarding your therapy.        May 2017 Details    Sun Mon Tue Wed Thu Fri Sat      1               2               3               4               5               6                 7               8               9               10               11               12               13                 14               15               16               17               18               19               20                 21               22               23               24               25      10 mg   See details      26      7.5 mg         27      7.5 mg           28      7.5 mg         29      10 mg         30      7.5 mg         31      7.5 mg             Date Details   05/25 This INR check               How to take your warfarin dose     To take:  7.5 mg Take 1.5 of the 5 mg tablets.    To take:  10 mg Take 2 of the 5 mg tablets.           June 2017 Details    Sun Mon Tue Wed Thu Fri Sat         1      10 mg         2      7.5 mg         3      7.5 mg           4      7.5 mg         5      10 mg         6      7.5 mg         7      7.5  mg         8      10 mg         9      7.5 mg         10      7.5 mg           11      7.5 mg         12      10 mg         13      7.5 mg         14      7.5 mg         15      10 mg         16      7.5 mg         17      7.5 mg           18      7.5 mg         19      10 mg         20      7.5 mg         21      7.5 mg         22            23               24                 25               26               27               28               29               30                 Date Details   No additional details    Date of next INR:  6/22/2017         How to take your warfarin dose     To take:  7.5 mg Take 1.5 of the 5 mg tablets.    To take:  10 mg Take 2 of the 5 mg tablets.

## 2017-05-25 NOTE — PROGRESS NOTES
ANTICOAGULATION FOLLOW-UP CLINIC VISIT    Patient Name:  Cameron Mariano  Date:  5/25/2017  Contact Type:  Face to Face    SUBJECTIVE:     Patient Findings     Positives Change in diet/appetite    Comments No missed dose. On new diet for past week with his wife which includes lots of salad. Plans to continue this diet. Weekly dose increased for this added vit k. Advised he needs to continue to keep diet consistent with the change.           OBJECTIVE    INR Protime   Date Value Ref Range Status   05/25/2017 1.8 (A) 0.86 - 1.14 Final       ASSESSMENT / PLAN  INR assessment SUB    Recheck INR In: 4 WEEKS    INR Location Clinic      Anticoagulation Summary as of 5/25/2017     INR goal 2.0-3.0   Today's INR 1.8!   Maintenance plan 10 mg (5 mg x 2) on Mon, Thu; 7.5 mg (5 mg x 1.5) all other days   Full instructions 10 mg on Mon, Thu; 7.5 mg all other days   Weekly total 57.5 mg   Plan last modified Franchesca Jarvis RN (5/25/2017)   Next INR check 6/22/2017   Target end date     Indications   Chronic atrial fibrillation (H) [I48.2]  Long-term (current) use of anticoagulants [Z79.01] [Z79.01]         Anticoagulation Episode Summary     INR check location     Preferred lab     Send INR reminders to AN ANTICOAG CLINIC    Comments       Anticoagulation Care Providers     Provider Role Specialty Phone number    Pablo Benz MD [facility] [phone]            See the Encounter Report to view Anticoagulation Flowsheet and Dosing Calendar (Go to Encounters tab in chart review, and find the Anticoagulation Therapy Visit)    Dosage adjustment made based on physician directed care plan.    Franchesca Jarvis RN

## 2017-06-09 ENCOUNTER — TELEPHONE (OUTPATIENT)
Dept: FAMILY MEDICINE | Facility: CLINIC | Age: 62
End: 2017-06-09

## 2017-06-09 ENCOUNTER — DOCUMENTATION ONLY (OUTPATIENT)
Dept: LAB | Facility: CLINIC | Age: 62
End: 2017-06-09

## 2017-06-09 DIAGNOSIS — E78.5 HYPERLIPIDEMIA LDL GOAL <130: ICD-10-CM

## 2017-06-09 DIAGNOSIS — I10 ESSENTIAL HYPERTENSION WITH GOAL BLOOD PRESSURE LESS THAN 140/90: Primary | ICD-10-CM

## 2017-06-09 NOTE — TELEPHONE ENCOUNTER
I called N[facility]stor and left a message to make  A fasting lab appt before Thursday.Before his appt with Dr. Benz.     Breanna Cooley CMA

## 2017-06-09 NOTE — TELEPHONE ENCOUNTER
Patient has an appointment for this Sunday we still needs orders signed for his appointment please.    Thanks,  Regina

## 2017-06-11 DIAGNOSIS — E78.5 HYPERLIPIDEMIA LDL GOAL <130: ICD-10-CM

## 2017-06-11 DIAGNOSIS — I10 ESSENTIAL HYPERTENSION WITH GOAL BLOOD PRESSURE LESS THAN 140/90: ICD-10-CM

## 2017-06-11 PROCEDURE — 80069 RENAL FUNCTION PANEL: CPT | Performed by: FAMILY MEDICINE

## 2017-06-11 PROCEDURE — 36415 COLL VENOUS BLD VENIPUNCTURE: CPT | Performed by: FAMILY MEDICINE

## 2017-06-11 PROCEDURE — 80061 LIPID PANEL: CPT | Performed by: FAMILY MEDICINE

## 2017-06-12 LAB
ALBUMIN SERPL-MCNC: 3.5 G/DL (ref 3.4–5)
ANION GAP SERPL CALCULATED.3IONS-SCNC: 8 MMOL/L (ref 3–14)
BUN SERPL-MCNC: 12 MG/DL (ref 7–30)
CALCIUM SERPL-MCNC: 8.7 MG/DL (ref 8.5–10.1)
CHLORIDE SERPL-SCNC: 105 MMOL/L (ref 94–109)
CHOLEST SERPL-MCNC: 154 MG/DL
CO2 SERPL-SCNC: 28 MMOL/L (ref 20–32)
CREAT SERPL-MCNC: 0.82 MG/DL (ref 0.66–1.25)
GFR SERPL CREATININE-BSD FRML MDRD: NORMAL ML/MIN/1.7M2
GLUCOSE SERPL-MCNC: 84 MG/DL (ref 70–99)
HDLC SERPL-MCNC: 60 MG/DL
LDLC SERPL CALC-MCNC: 85 MG/DL
NONHDLC SERPL-MCNC: 94 MG/DL
PHOSPHATE SERPL-MCNC: 2.8 MG/DL (ref 2.5–4.5)
POTASSIUM SERPL-SCNC: 4.5 MMOL/L (ref 3.4–5.3)
SODIUM SERPL-SCNC: 141 MMOL/L (ref 133–144)
TRIGL SERPL-MCNC: 43 MG/DL

## 2017-06-15 ENCOUNTER — OFFICE VISIT (OUTPATIENT)
Dept: FAMILY MEDICINE | Facility: CLINIC | Age: 62
End: 2017-06-15
Payer: COMMERCIAL

## 2017-06-15 VITALS
OXYGEN SATURATION: 96 % | SYSTOLIC BLOOD PRESSURE: 109 MMHG | WEIGHT: 177 LBS | BODY MASS INDEX: 25.4 KG/M2 | TEMPERATURE: 97.4 F | HEART RATE: 76 BPM | DIASTOLIC BLOOD PRESSURE: 73 MMHG

## 2017-06-15 DIAGNOSIS — I48.20 CHRONIC ATRIAL FIBRILLATION (H): ICD-10-CM

## 2017-06-15 DIAGNOSIS — Z79.01 LONG TERM (CURRENT) USE OF ANTICOAGULANTS: ICD-10-CM

## 2017-06-15 DIAGNOSIS — I10 HYPERTENSION GOAL BP (BLOOD PRESSURE) < 140/90: ICD-10-CM

## 2017-06-15 DIAGNOSIS — N52.01 ERECTILE DYSFUNCTION DUE TO ARTERIAL INSUFFICIENCY: ICD-10-CM

## 2017-06-15 DIAGNOSIS — E78.5 HYPERLIPIDEMIA LDL GOAL <130: ICD-10-CM

## 2017-06-15 PROCEDURE — 99214 OFFICE O/P EST MOD 30 MIN: CPT | Performed by: FAMILY MEDICINE

## 2017-06-15 RX ORDER — DILTIAZEM HYDROCHLORIDE 240 MG/1
240 CAPSULE, COATED, EXTENDED RELEASE ORAL DAILY
Qty: 90 CAPSULE | Refills: 1 | Status: SHIPPED | OUTPATIENT
Start: 2017-06-15 | End: 2018-02-24

## 2017-06-15 RX ORDER — LISINOPRIL 2.5 MG/1
2.5 TABLET ORAL DAILY
Qty: 90 TABLET | Refills: 1 | Status: SHIPPED | OUTPATIENT
Start: 2017-06-15 | End: 2018-02-24

## 2017-06-15 RX ORDER — ATORVASTATIN CALCIUM 40 MG/1
40 TABLET, FILM COATED ORAL DAILY
Qty: 90 TABLET | Refills: 3 | Status: SHIPPED | OUTPATIENT
Start: 2017-06-15 | End: 2019-05-31

## 2017-06-15 RX ORDER — WARFARIN SODIUM 5 MG/1
TABLET ORAL
Qty: 120 TABLET | Refills: 1 | Status: SHIPPED | OUTPATIENT
Start: 2017-06-15 | End: 2018-01-17

## 2017-06-15 RX ORDER — METOPROLOL SUCCINATE 100 MG/1
100 TABLET, EXTENDED RELEASE ORAL DAILY
Qty: 90 TABLET | Refills: 1 | Status: SHIPPED | OUTPATIENT
Start: 2017-06-15 | End: 2018-03-26

## 2017-06-15 RX ORDER — SILDENAFIL CITRATE 20 MG/1
TABLET ORAL
Qty: 20 TABLET | Refills: 3 | Status: SHIPPED | OUTPATIENT
Start: 2017-06-15 | End: 2018-11-24

## 2017-06-15 NOTE — PROGRESS NOTES
SUBJECTIVE:                                                    Cameron Mariano is a 61 year old male who presents to clinic today for the following health issues:    Follow-up htn, high cholesterol, cardiolmyopathy, ed and a.fib.  No outside blood pressure readings. Active/busy at work. Lots lifting.   No chest pain. Some shortness of breath with humidity. No history asthma or MDI needed in past.   Some history ALLERGIC RHINITIS - in AM. Last cardiology years ago. No LIGHTHEADED.   No urine changes or hematuria. No nausea, vomiting or diarrhea or black/bloody stools. Sleep ok and emotionally ok.   viagra - no side effects.   2-3 cups/day. Drinking lots of water. Seeing INR RN.   Patient working on new diet.    PROBLEMS TO ADD ON...    Problem list and histories reviewed & adjusted, as indicated.  Additional history: as documented    Patient Active Problem List   Diagnosis     Chronic atrial fibrillation (H)     Mitral valve disorder     Secondary cardiomyopathy (H)     Hyperlipidemia LDL goal <130     Hypertension goal BP (blood pressure) < 140/90     Advanced directives, counseling/discussion     Long-term (current) use of anticoagulants [Z79.01]     Essential hypertension with goal blood pressure less than 140/90     Erectile dysfunction due to arterial insufficiency     Past Surgical History:   Procedure Laterality Date     APPENDECTOMY         Social History   Substance Use Topics     Smoking status: Never Smoker     Smokeless tobacco: Never Used     Alcohol use Yes      Comment: 3-4 beers a week     Family History   Problem Relation Age of Onset     Blood Disease Mother      blood clots     CANCER Father      stomach     Alcohol/Drug Father      smoker     HEART DISEASE Brother            Reviewed and updated as needed this visit by clinical staff  Allergies  Meds       Reviewed and updated as needed this visit by Provider         ROS:    OBJECTIVE:                                                    /73  (Cuff Size: Adult Regular)  Pulse 76  Temp 97.4  F (36.3  C) (Oral)  Wt 177 lb (80.3 kg)  SpO2 96%  BMI 25.4 kg/m2  Body mass index is 25.4 kg/(m^2).  GENERAL: healthy, alert and no distress  EYES: Eyes grossly normal to inspection, PERRL and conjunctivae and sclerae normal  HENT: ear canals and TM's normal, nose and mouth without ulcers or lesions  NECK: no adenopathy, no asymmetry, masses, or scars and thyroid normal to palpation  RESP: lungs clear to auscultation - no rales, rhonchi or wheezes  CV: regular rate and rhythm, normal S1 S2, no S3 or S4, no murmur, click or rub, no peripheral edema and peripheral pulses strong  ABDOMEN: soft, nontender, no hepatosplenomegaly, no masses and bowel sounds normal  MS: no gross musculoskeletal defects noted, no edema  SKIN: no suspicious lesions or rashes  NEURO: Normal strength and tone, mentation intact and speech normal  PSYCH: mentation appears normal, affect normal/bright         ASSESSMENT/PLAN:                                                    ASSESSMENT / PLAN:  (I10) Hypertension goal BP (blood pressure) < 140/90  Comment: a little low today  Plan: lisinopril (PRINIVIL/ZESTRIL) 2.5 MG tablet,         diltiazem (CARTIA XT) 240 MG 24 hr capsule,         metoprolol (TOPROL-XL) 100 MG 24 hr tablet        Continue self-monitor and lots of water/limit caffeine - especially with hot days. 1/2 toprol if too low.     (I48.2) Chronic atrial fibrillation (H)  Comment: stable  Plan: warfarin (COUMADIN) 5 MG tablet        Continue meds. Back to cards if needed or symptoms. Chest pain or shortness of breath or worsening LIGHTHEADED/palpitations to ER.       (N52.01) Erectile dysfunction due to arterial insufficiency  Comment: good exercise toleracne  Plan: sildenafil (REVATIO/VIAGRA) 20 MG tablet        To ER if chest pain, shortness of breath , prolonged erections      (E78.5) Hyperlipidemia LDL goal <130  Comment: stable  Plan: atorvastatin (LIPITOR) 40 MG tablet         Continue diet/exercise.         Pablo Benz MD  [facility]

## 2017-06-15 NOTE — NURSING NOTE
"Chief Complaint   Patient presents with     Hypertension     Lipids       Initial /73 (Cuff Size: Adult Regular)  Pulse 76  Temp 97.4  F (36.3  C) (Oral)  Wt 177 lb (80.3 kg)  SpO2 96%  BMI 25.4 kg/m2 Estimated body mass index is 25.4 kg/(m^2) as calculated from the following:    Height as of 9/8/16: 5' 10\" (1.778 m).    Weight as of this encounter: 177 lb (80.3 kg).  Medication Reconciliation: complete    JENNIFER Desouza MA    "

## 2017-06-15 NOTE — MR AVS SNAPSHOT
After Visit Summary   6/15/2017    Cameron Maraino    MRN: [id number]           Patient Information     Date Of Birth          1955        Visit Information        Provider Department      6/15/2017 7:20 AM Pablo Benz MD [facility]        Today's Diagnoses     Hypertension goal BP (blood pressure) < 140/90        Chronic atrial fibrillation (H)        Long term (current) use of anticoagulants        Erectile dysfunction due to arterial insufficiency        Hyperlipidemia LDL goal <130           Follow-ups after your visit        Your next 10 appointments already scheduled     Jun 22, 2017  7:15 AM CDT   Anticoagulation Visit with AN ANTI COAG   [facility]    [address] [facility] [address] [phone]              Who to contact     If you have questions or need follow up information about today's clinic visit or your schedule please contact [facility] directly at [phone].  Normal or non-critical lab and imaging results will be communicated to you by eDosseahart, letter or phone within 4 business days after the clinic has received the results. If you do not hear from us within 7 days, please contact the clinic through eDosseahart or phone. If you have a critical or abnormal lab result, we will notify you by phone as soon as possible.  Submit refill requests through [employer] or call your pharmacy and they will forward the refill request to us. Please allow 3 business days for your refill to be completed.          Additional Information About Your Visit        MyChart Information     [employer] gives you secure access to your electronic health record. If you see a primary care provider, you can also send messages to your care team and make appointments. If you have questions, please call your primary care clinic.  If you do not have a primary care provider, please call [phone] and they will assist you.        Care  EveryWhere ID     This is your Care EveryWhere ID. This could be used by other organizations to access your [facility] medical records  [id number]        Your Vitals Were     Pulse Temperature Pulse Oximetry BMI (Body Mass Index)          76 97.4  F (36.3  C) (Oral) 96% 25.4 kg/m2         Blood Pressure from Last 3 Encounters:   06/15/17 109/73   02/07/17 111/70   09/08/16 134/82    Weight from Last 3 Encounters:   06/15/17 177 lb (80.3 kg)   02/07/17 187 lb (84.8 kg)   09/08/16 183 lb (83 kg)              Today, you had the following     No orders found for display         Today's Medication Changes          These changes are accurate as of: 6/15/17  7:37 AM.  If you have any questions, ask your nurse or doctor.               These medicines have changed or have updated prescriptions.        Dose/Directions    diltiazem 240 MG 24 hr capsule   Commonly known as:  CARTIA XT   This may have changed:  See the new instructions.   Used for:  Hypertension goal BP (blood pressure) < 140/90        Dose:  240 mg   Take 1 capsule (240 mg) by mouth daily Pharmacy ok to hold prescription until due   Quantity:  90 capsule   Refills:  1       lisinopril 2.5 MG tablet   Commonly known as:  PRINIVIL/Zestril   This may have changed:  See the new instructions.   Used for:  Hypertension goal BP (blood pressure) < 140/90        Dose:  2.5 mg   Take 1 tablet (2.5 mg) by mouth daily Pharmacy ok to hold prescription until due   Quantity:  90 tablet   Refills:  1            Where to get your medicines      These medications were sent to [facility] - [place], [place] - [address]  [address] [address]     Phone:  [phone]     atorvastatin 40 MG tablet    diltiazem 240 MG 24 hr capsule    lisinopril 2.5 MG tablet    metoprolol 100 MG 24 hr tablet    sildenafil 20 MG tablet    warfarin 5 MG tablet                Primary Care Provider Office Phone # Fax #    Pablo Benz [phone]  [phone]       [facility] [address] [address] [facility] [address]        Thank you!     Thank you for choosing [facility]  for your care. Our goal is always to provide you with excellent care. Hearing back from our patients is one way we can continue to improve our services. Please take a few minutes to complete the written survey that you may receive in the mail after your visit with us. Thank you!             Your Updated Medication List - Protect others around you: Learn how to safely use, store and throw away your medicines at www.disposemymeds.org.          This list is accurate as of: 6/15/17  7:37 AM.  Always use your most recent med list.                   Brand Name Dispense Instructions for use    aspirin 81 MG tablet      Take 1 tablet by mouth daily.       atorvastatin 40 MG tablet    LIPITOR    90 tablet    Take 1 tablet (40 mg) by mouth daily for cholesterol Pharmacy ok to hold prescription until due       diltiazem 240 MG 24 hr capsule    CARTIA XT    90 capsule    Take 1 capsule (240 mg) by mouth daily Pharmacy ok to hold prescription until due       fish oil-omega-3 fatty acids 1000 MG capsule      Take 2 g by mouth daily.       lisinopril 2.5 MG tablet    PRINIVIL/Zestril    90 tablet    Take 1 tablet (2.5 mg) by mouth daily Pharmacy ok to hold prescription until due       metoprolol 100 MG 24 hr tablet    TOPROL-XL    90 tablet    Take 1 tablet (100 mg) by mouth daily Take at bedtime Pharmacy ok to hold prescription until due       Multi-vitamin Tabs tablet   Generic drug:  multivitamin, therapeutic with minerals      1 TABLET DAILY       sildenafil 20 MG tablet    REVATIO/VIAGRA    20 tablet    1-2 tabs daily as needed for ed.  Never use with nitroglycerin, terazosin or doxazosin.       vitamin D 400 UNITS capsule      Take 1 capsule by mouth daily.       warfarin 5 MG tablet    COUMADIN    120 tablet    daily as directed. Currently 5 mg sunday and 7.5 mg rest of  week days

## 2017-06-22 ENCOUNTER — ANTICOAGULATION THERAPY VISIT (OUTPATIENT)
Dept: NURSING | Facility: CLINIC | Age: 62
End: 2017-06-22
Payer: COMMERCIAL

## 2017-06-22 DIAGNOSIS — I48.20 CHRONIC ATRIAL FIBRILLATION (H): ICD-10-CM

## 2017-06-22 DIAGNOSIS — Z79.01 LONG-TERM (CURRENT) USE OF ANTICOAGULANTS: ICD-10-CM

## 2017-06-22 LAB — INR POINT OF CARE: 2 (ref 0.86–1.14)

## 2017-06-22 PROCEDURE — 85610 PROTHROMBIN TIME: CPT | Mod: QW

## 2017-06-22 PROCEDURE — 36416 COLLJ CAPILLARY BLOOD SPEC: CPT

## 2017-06-22 PROCEDURE — 99207 ZZC NO CHARGE NURSE ONLY: CPT

## 2017-06-22 NOTE — PROGRESS NOTES
ANTICOAGULATION FOLLOW-UP CLINIC VISIT    Patient Name:  Cameron Mariano  Date:  6/22/2017  Contact Type:  Face to Face    SUBJECTIVE:     Patient Findings     Positives Missed doses, No Problem Findings    Comments Therapeutic at 2.0. Continues on diet high in vegetables and salad. Missed dose yesterday. Dose adjusted this week.            OBJECTIVE    INR Protime   Date Value Ref Range Status   06/22/2017 2.0 (A) 0.86 - 1.14 Final       ASSESSMENT / PLAN  INR assessment THER    Recheck INR In: 5 WEEKS    INR Location Clinic      Anticoagulation Summary as of 6/22/2017     INR goal 2.0-3.0   Today's INR 2.0   Maintenance plan 10 mg (5 mg x 2) on Mon, Thu; 7.5 mg (5 mg x 1.5) all other days   Full instructions 6/23: 10 mg; Otherwise 10 mg on Mon, Thu; 7.5 mg all other days   Weekly total 57.5 mg   Plan last modified Franchesca Jarvis RN (5/25/2017)   Next INR check 7/27/2017   Target end date     Indications   Chronic atrial fibrillation (H) [I48.2]  Long-term (current) use of anticoagulants [Z79.01] [Z79.01]         Anticoagulation Episode Summary     INR check location     Preferred lab     Send INR reminders to AN ANTICOAG CLINIC    Comments       Anticoagulation Care Providers     Provider Role Specialty Phone number    Pablo Benz MD [facility] [phone]            See the Encounter Report to view Anticoagulation Flowsheet and Dosing Calendar (Go to Encounters tab in chart review, and find the Anticoagulation Therapy Visit)    Dosage adjustment made based on physician directed care plan.    Franchesca Jarvis RN

## 2017-06-22 NOTE — MR AVS SNAPSHOT
Cameron Mariano   6/22/2017 7:15 AM   Anticoagulation Therapy Visit    Description:  61 year old male   Provider:  AN ANTI COAG   Department:  An Nurse           INR as of 6/22/2017     Today's INR 2.0      Anticoagulation Summary as of 6/22/2017     INR goal 2.0-3.0   Today's INR 2.0   Full instructions 6/23: 10 mg; Otherwise 10 mg on Mon, Thu; 7.5 mg all other days   Next INR check 7/27/2017    Indications   Chronic atrial fibrillation (H) [I48.2]  Long-term (current) use of anticoagulants [Z79.01] [Z79.01]         Your next Anticoagulation Clinic appointment(s)     Jul 27, 2017  7:15 AM CDT   An[facility]agulation [facility] with AN ANTI COAG   [facility]    [address] [facility] [address] [phone]              Contact Numbers     [facility]  Please call  [phone] to cancel and/or reschedule your appointment, or with any problems or questions regarding your therapy.        June 2017 Details    Sun Mon Tue Wed Thu Fri Sat         1               2               3                 4               5               6               7               8               9               10                 11               12               13               14               15               16               17                 18               19               20               21               22      10 mg   See details      23      10 mg         24      7.5 mg           25      7.5 mg         26      10 mg         27      7.5 mg         28      7.5 mg         29      10 mg         30      7.5 mg           Date Details   06/22 This INR check               How to take your warfarin dose     To take:  7.5 mg Take 1.5 of the 5 mg tablets.    To take:  10 mg Take 2 of the 5 mg tablets.           July 2017 Details    Sun Mon Tue Wed Thu Fri Sat           1      7.5 mg           2      7.5 mg         3      10 mg         4      7.5 mg         5      7.5 mg         6      10 mg          7      7.5 mg         8      7.5 mg           9      7.5 mg         10      10 mg         11      7.5 mg         12      7.5 mg         13      10 mg         14      7.5 mg         15      7.5 mg           16      7.5 mg         17      10 mg         18      7.5 mg         19      7.5 mg         20      10 mg         21      7.5 mg         22      7.5 mg           23      7.5 mg         24      10 mg         25      7.5 mg         26      7.5 mg         27            28               29                 30               31                     Date Details   No additional details    Date of next INR:  7/27/2017         How to take your warfarin dose     To take:  7.5 mg Take 1.5 of the 5 mg tablets.    To take:  10 mg Take 2 of the 5 mg tablets.

## 2017-07-27 ENCOUNTER — ANTICOAGULATION THERAPY VISIT (OUTPATIENT)
Dept: NURSING | Facility: CLINIC | Age: 62
End: 2017-07-27
Payer: COMMERCIAL

## 2017-07-27 DIAGNOSIS — I48.20 CHRONIC ATRIAL FIBRILLATION (H): ICD-10-CM

## 2017-07-27 DIAGNOSIS — Z79.01 LONG-TERM (CURRENT) USE OF ANTICOAGULANTS: ICD-10-CM

## 2017-07-27 LAB — INR POINT OF CARE: 2.6 (ref 0.86–1.14)

## 2017-07-27 PROCEDURE — 99207 ZZC NO CHARGE NURSE ONLY: CPT

## 2017-07-27 PROCEDURE — 36416 COLLJ CAPILLARY BLOOD SPEC: CPT

## 2017-07-27 PROCEDURE — 85610 PROTHROMBIN TIME: CPT | Mod: QW

## 2017-07-27 NOTE — PROGRESS NOTES
ANTICOAGULATION FOLLOW-UP CLINIC VISIT    Patient Name:  Cameron Mariano  Date:  7/27/2017  Contact Type:  Face to Face    SUBJECTIVE:     Patient Findings     Positives No Problem Findings           OBJECTIVE    INR Protime   Date Value Ref Range Status   07/27/2017 2.6 (A) 0.86 - 1.14 Final       ASSESSMENT / PLAN  INR assessment THER    Recheck INR In: 5 WEEKS    INR Location Clinic      Anticoagulation Summary as of 7/27/2017     INR goal 2.0-3.0   Today's INR 2.6   Maintenance plan 10 mg (5 mg x 2) on Mon, Thu; 7.5 mg (5 mg x 1.5) all other days   Full instructions 10 mg on Mon, Thu; 7.5 mg all other days   Weekly total 57.5 mg   No change documented Sonia Escalona RN   Plan last modified Franchesca Jarvis RN (5/25/2017)   Next INR check 8/31/2017   Target end date     Indications   Chronic atrial fibrillation (H) [I48.2]  Long-term (current) use of anticoagulants [Z79.01] [Z79.01]         Anticoagulation Episode Summary     INR check location     Preferred lab     Send INR reminders to AN ANTICOAG CLINIC    Comments       Anticoagulation Care Providers     Provider Role Specialty Phone number    Pablo Benz MD [facility] [phone]            See the Encounter Report to view Anticoagulation Flowsheet and Dosing Calendar (Go to Encounters tab in chart review, and find the Anticoagulation Therapy Visit)        Sonia Escalona, ESTEBAN

## 2017-07-27 NOTE — MR AVS SNAPSHOT
Cameron Mariano   7/27/2017 7:15 AM   Anticoagulation Therapy Visit    Description:  62 year old male   Provider:  AN ANTI COAG   Department:  An Nurse           INR as of 7/27/2017     Today's INR 2.6      Anticoagulation Summary as of 7/27/2017     INR goal 2.0-3.0   Today's INR 2.6   Full instructions 10 mg on Mon, Thu; 7.5 mg all other days   Next INR check 8/31/2017    Indications   Chronic atrial fibrillation (H) [I48.2]  Long-term (current) use of anticoagulants [Z79.01] [Z79.01]         Your next Anticoagulation Clinic appointment(s)     Aug 31, 2017  7:15 AM CDT   Anticoagulation Visit with AN ANTI COAG   [facility]    [address] [facility] [address] [phone]              Contact Numbers     [facility]  Please call  [phone] to cancel and/or reschedule your appointment, or with any problems or questions regarding your therapy.        July 2017 Details    Sun Mon Tue Wed Thu Fri Sat           1                 2               3               4               5               6               7               8                 9               10               11               12               13               14               15                 16               17               18               19               20               21               22                 23               24               25               26               27      10 mg   See details      28      7.5 mg         29      7.5 mg           30      7.5 mg         31      10 mg               Date Details   07/27 This INR check               How to take your warfarin dose     To take:  7.5 mg Take 1.5 of the 5 mg tablets.    To take:  10 mg Take 2 of the 5 mg tablets.           August 2017 Details    Sun Mon Tue Wed Thu Fri Sat       1      7.5 mg         2      7.5 mg         3      10 mg         4      7.5 mg         5      7.5 mg           6      7.5 mg         7      10 mg          8      7.5 mg         9      7.5 mg         10      10 mg         11      7.5 mg         12      7.5 mg           13      7.5 mg         14      10 mg         15      7.5 mg         16      7.5 mg         17      10 mg         18      7.5 mg         19      7.5 mg           20      7.5 mg         21      10 mg         22      7.5 mg         23      7.5 mg         24      10 mg         25      7.5 mg         26      7.5 mg           27      7.5 mg         28      10 mg         29      7.5 mg         30      7.5 mg         31               Date Details   No additional details    Date of next INR:  8/31/2017         How to take your warfarin dose     To take:  7.5 mg Take 1.5 of the 5 mg tablets.    To take:  10 mg Take 2 of the 5 mg tablets.

## 2017-08-31 ENCOUNTER — ANTICOAGULATION THERAPY VISIT (OUTPATIENT)
Dept: NURSING | Facility: CLINIC | Age: 62
End: 2017-08-31
Payer: COMMERCIAL

## 2017-08-31 DIAGNOSIS — Z79.01 LONG-TERM (CURRENT) USE OF ANTICOAGULANTS: ICD-10-CM

## 2017-08-31 DIAGNOSIS — I48.20 CHRONIC ATRIAL FIBRILLATION (H): ICD-10-CM

## 2017-08-31 LAB — INR POINT OF CARE: 1.8 (ref 0.86–1.14)

## 2017-08-31 PROCEDURE — 85610 PROTHROMBIN TIME: CPT | Mod: QW

## 2017-08-31 PROCEDURE — 99207 ZZC NO CHARGE NURSE ONLY: CPT

## 2017-08-31 PROCEDURE — 36416 COLLJ CAPILLARY BLOOD SPEC: CPT

## 2017-08-31 NOTE — MR AVS SNAPSHOT
Cameron Mariano   8/31/2017 7:15 AM   Anticoagulation Therapy Visit    Description:  62 year old male   Provider:  AN ANTI COAG   Department:  An Nurse           INR as of 8/31/2017     Today's INR 1.8!      Anticoagulation Summary as of 8/31/2017     INR goal 2.0-3.0   Today's INR 1.8!   Full instructions 9/1: 10 mg; Otherwise 10 mg on Mon, Thu; 7.5 mg all other days   Next INR check 9/28/2017    Indications   Chronic atrial fibrillation (H) [I48.2]  Long-term (current) use of anticoagulants [Z79.01] [Z79.01]         Your next Anticoagulation Clinic appointment(s)     Sep 27, 2017  7:15 AM CDT   Anticoagulation Visit with AN ANTI COAG   [facility]    [address] [facility] [address] [phone]              Contact Numbers     [facility]  Please call  [phone] to cancel and/or reschedule your appointment, or with any problems or questions regarding your therapy.        August 2017 Details    Sun Mon Tue Wed Thu Fri Sat       1               2               3               4               5                 6               7               8               9               10               11               12                 13               14               15               16               17               18               19                 20               21               22               23               24               25               26                 27               28               29               30               31      10 mg   See details         Date Details   08/31 This INR check               How to take your warfarin dose     To take:  10 mg Take 2 of the 5 mg tablets.           September 2017 Details    Sun Mon Tue Wed Thu Fri Sat          1      10 mg         2      7.5 mg           3      7.5 mg         4      10 mg         5      7.5 mg         6      7.5 mg         7      10 mg         8      7.5 mg         9      7.5 mg            10      7.5 mg         11      10 mg         12      7.5 mg         13      7.5 mg         14      10 mg         15      7.5 mg         16      7.5 mg           17      7.5 mg         18      10 mg         19      7.5 mg         20      7.5 mg         21      10 mg         22      7.5 mg         23      7.5 mg           24      7.5 mg         25      10 mg         26      7.5 mg         27      7.5 mg         28            29               30                Date Details   No additional details    Date of next INR:  9/28/2017         How to take your warfarin dose     To take:  7.5 mg Take 1.5 of the 5 mg tablets.    To take:  10 mg Take 2 of the 5 mg tablets.

## 2017-08-31 NOTE — PROGRESS NOTES
ANTICOAGULATION FOLLOW-UP CLINIC VISIT    Patient Name:  Cameron Mariano  Date:  8/31/2017  Contact Type:  Face to Face    SUBJECTIVE:     Patient Findings     Positives Change in diet/appetite, Missed doses    Comments INR 1.8. Missed 7.5 mg in past week and has been eating lots of salad. Dose adjusted and he will cut back on salad.           OBJECTIVE    INR Protime   Date Value Ref Range Status   08/31/2017 1.8 (A) 0.86 - 1.14 Final       ASSESSMENT / PLAN  INR assessment SUB    Recheck INR In: 4 WEEKS    INR Location Clinic      Anticoagulation Summary as of 8/31/2017     INR goal 2.0-3.0   Today's INR 1.8!   Maintenance plan 10 mg (5 mg x 2) on Mon, Thu; 7.5 mg (5 mg x 1.5) all other days   Full instructions 9/1: 10 mg; Otherwise 10 mg on Mon, Thu; 7.5 mg all other days   Weekly total 57.5 mg   Plan last modified Franchesca Jarvis RN (5/25/2017)   Next INR check 9/28/2017   Target end date     Indications   Chronic atrial fibrillation (H) [I48.2]  Long-term (current) use of anticoagulants [Z79.01] [Z79.01]         Anticoagulation Episode Summary     INR check location     Preferred lab     Send INR reminders to AN ANTICOAG CLINIC    Comments       Anticoagulation Care Providers     Provider Role Specialty Phone number    Pablo Benz MD [facility] [phone]            See the Encounter Report to view Anticoagulation Flowsheet and Dosing Calendar (Go to Encounters tab in chart review, and find the Anticoagulation Therapy Visit)    Dosage adjustment made based on physician directed care plan.    Franchesca Jarvis RN

## 2017-09-27 ENCOUNTER — ANTICOAGULATION THERAPY VISIT (OUTPATIENT)
Dept: NURSING | Facility: CLINIC | Age: 62
End: 2017-09-27
Payer: COMMERCIAL

## 2017-09-27 DIAGNOSIS — Z79.01 LONG-TERM (CURRENT) USE OF ANTICOAGULANTS: ICD-10-CM

## 2017-09-27 DIAGNOSIS — I48.20 CHRONIC ATRIAL FIBRILLATION (H): ICD-10-CM

## 2017-09-27 LAB — INR POINT OF CARE: 2.7 (ref 0.86–1.14)

## 2017-09-27 PROCEDURE — 85610 PROTHROMBIN TIME: CPT | Mod: QW

## 2017-09-27 PROCEDURE — 99207 ZZC NO CHARGE NURSE ONLY: CPT

## 2017-09-27 PROCEDURE — 36416 COLLJ CAPILLARY BLOOD SPEC: CPT

## 2017-09-27 NOTE — MR AVS SNAPSHOT
Cameron Mariano   9/27/2017 7:15 AM   Anticoagulation Therapy Visit    Description:  62 year old male   Provider:  AN ANTI COAG   Department:  An Nurse           INR as of 9/27/2017     Today's INR 2.7      Anticoagulation Summary as of 9/27/2017     INR goal 2.0-3.0   Today's INR 2.7   Full instructions 10 mg on Mon, Thu; 7.5 mg all other days   Next INR check 11/1/2017    Indications   Chronic atrial fibrillation (H) [I48.2]  Long-term (current) use of anticoagulants [Z79.01] [Z79.01]         Your next Anticoagulation Clinic appointment(s)     Nov 01, 2017  7:15 AM CDT   Anticoagulation Visit with AN ANTI COAG   [facility]    [address] [facility] [address] [phone]              Contact Numbers     [facility]  Please call  [phone] to cancel and/or reschedule your appointment, or with any problems or questions regarding your therapy.        September 2017 Details    Sun Mon Tue Wed Thu Fri Sat          1               2                 3               4               5               6               7               8               9                 10               11               12               13               14               15               16                 17               18               19               20               21               22               23                 24               25               26               27      7.5 mg   See details      28      10 mg         29      7.5 mg         30      7.5 mg          Date Details   09/27 This INR check               How to take your warfarin dose     To take:  7.5 mg Take 1.5 of the 5 mg tablets.    To take:  10 mg Take 2 of the 5 mg tablets.           October 2017 Details    Sun Mon Tue Wed Thu Fri Sat     1      7.5 mg         2      10 mg         3      7.5 mg         4      7.5 mg         5      10 mg         6      7.5 mg         7      7.5 mg           8      7.5 mg          9      10 mg         10      7.5 mg         11      7.5 mg         12      10 mg         13      7.5 mg         14      7.5 mg           15      7.5 mg         16      10 mg         17      7.5 mg         18      7.5 mg         19      10 mg         20      7.5 mg         21      7.5 mg           22      7.5 mg         23      10 mg         24      7.5 mg         25      7.5 mg         26      10 mg         27      7.5 mg         28      7.5 mg           29      7.5 mg         30      10 mg         31      7.5 mg              Date Details   No additional details            How to take your warfarin dose     To take:  7.5 mg Take 1.5 of the 5 mg tablets.    To take:  10 mg Take 2 of the 5 mg tablets.           November 2017 Details    Sun Mon Tue Wed Thu Fri Sat        1            2               3               4                 5               6               7               8               9               10               11                 12               13               14               15               16               17               18                 19               20               21               22               23               24               25                 26               27               28               29               30                  Date Details   No additional details    Date of next INR:  11/1/2017         How to take your warfarin dose     To take:  7.5 mg Take 1.5 of the 5 mg tablets.

## 2017-09-27 NOTE — PROGRESS NOTES
ANTICOAGULATION FOLLOW-UP CLINIC VISIT    Patient Name:  Cameron Mariano  Date:  9/27/2017  Contact Type:  Face to Face    SUBJECTIVE:     Patient Findings     Positives No Problem Findings           OBJECTIVE    INR Protime   Date Value Ref Range Status   09/27/2017 2.7 (A) 0.86 - 1.14 Final       ASSESSMENT / PLAN  INR assessment THER    Recheck INR In: 5 WEEKS    INR Location Clinic      Anticoagulation Summary as of 9/27/2017     INR goal 2.0-3.0   Today's INR 2.7   Maintenance plan 10 mg (5 mg x 2) on Mon, Thu; 7.5 mg (5 mg x 1.5) all other days   Full instructions 10 mg on Mon, Thu; 7.5 mg all other days   Weekly total 57.5 mg   No change documented Franchesca Jarvis RN   Plan last modified Franchesca Jarvis RN (5/25/2017)   Next INR check 11/1/2017   Target end date     Indications   Chronic atrial fibrillation (H) [I48.2]  Long-term (current) use of anticoagulants [Z79.01] [Z79.01]         Anticoagulation Episode Summary     INR check location     Preferred lab     Send INR reminders to AN ANTICOAG CLINIC    Comments       Anticoagulation Care Providers     Provider Role Specialty Phone number    Pablo Benz MD [facility] [phone]            See the Encounter Report to view Anticoagulation Flowsheet and Dosing Calendar (Go to Encounters tab in chart review, and find the Anticoagulation Therapy Visit)    Dosage adjustment made based on physician directed care plan.    Franchesca Jarvis RN

## 2017-11-01 ENCOUNTER — ANTICOAGULATION THERAPY VISIT (OUTPATIENT)
Dept: NURSING | Facility: CLINIC | Age: 62
End: 2017-11-01
Payer: COMMERCIAL

## 2017-11-01 DIAGNOSIS — I48.20 CHRONIC ATRIAL FIBRILLATION (H): ICD-10-CM

## 2017-11-01 DIAGNOSIS — Z79.01 LONG-TERM (CURRENT) USE OF ANTICOAGULANTS: ICD-10-CM

## 2017-11-01 LAB — INR POINT OF CARE: 2.5 (ref 0.86–1.14)

## 2017-11-01 PROCEDURE — 99207 ZZC NO CHARGE NURSE ONLY: CPT

## 2017-11-01 PROCEDURE — 36416 COLLJ CAPILLARY BLOOD SPEC: CPT

## 2017-11-01 PROCEDURE — 85610 PROTHROMBIN TIME: CPT | Mod: QW

## 2017-11-01 NOTE — PROGRESS NOTES
ANTICOAGULATION FOLLOW-UP CLINIC VISIT    Patient Name:  Cameron Mariano  Date:  11/1/2017  Contact Type:  Face to Face    SUBJECTIVE:     Patient Findings     Positives No Problem Findings           OBJECTIVE    INR Protime   Date Value Ref Range Status   11/01/2017 2.5 (A) 0.86 - 1.14 Final       ASSESSMENT / PLAN  INR assessment THER    Recheck INR In: 5 WEEKS    INR Location Clinic      Anticoagulation Summary as of 11/1/2017     INR goal 2.0-3.0   Today's INR 2.5   Maintenance plan 10 mg (5 mg x 2) on Mon, Thu; 7.5 mg (5 mg x 1.5) all other days   Full instructions 10 mg on Mon, Thu; 7.5 mg all other days   Weekly total 57.5 mg   No change documented Franchesca Jarvis RN   Plan last modified Franchesca Jarvis RN (5/25/2017)   Next INR check 12/6/2017   Target end date     Indications   Chronic atrial fibrillation (H) [I48.2]  Long-term (current) use of anticoagulants [Z79.01] [Z79.01]         Anticoagulation Episode Summary     INR check location     Preferred lab     Send INR reminders to AN ANTICOAG CLINIC    Comments       Anticoagulation Care Providers     Provider Role Specialty Phone number    Pablo Benz MD [facility] [phone]            See the Encounter Report to view Anticoagulation Flowsheet and Dosing Calendar (Go to Encounters tab in chart review, and find the Anticoagulation Therapy Visit)        Franchesca Jarvis RN

## 2017-11-01 NOTE — MR AVS SNAPSHOT
Cameron Mariano   11/1/2017 7:15 AM   Anticoagulation Therapy Visit    Description:  62 year old male   Provider:  AN ANTI COAG   Department:  An Nurse           INR as of 11/1/2017     Today's INR 2.5      Anticoagulation Summary as of 11/1/2017     INR goal 2.0-3.0   Today's INR 2.5   Full instructions 10 mg on Mon, Thu; 7.5 mg all other days   Next INR check 12/6/2017    Indications   Chronic atrial fibrillation (H) [I48.2]  Long-term (current) use of anticoagulants [Z79.01] [Z79.01]         Your next Anticoagulation Clinic appointment(s)     Dec 06, 2017  7:15 AM CST   Anticoagulation Visit with AN ANTI COAG   [facility]    [address] [facility] [address] [phone]              Contact Numbers     [facility]  Please call  [phone] to cancel and/or reschedule your appointment, or with any problems or questions regarding your therapy.        November 2017 Details    Sun Mon Tue Wed Thu Fri Sat        1      7.5 mg   See details      2      10 mg         3      7.5 mg         4      7.5 mg           5      7.5 mg         6      10 mg         7      7.5 mg         8      7.5 mg         9      10 mg         10      7.5 mg         11      7.5 mg           12      7.5 mg         13      10 mg         14      7.5 mg         15      7.5 mg         16      10 mg         17      7.5 mg         18      7.5 mg           19      7.5 mg         20      10 mg         21      7.5 mg         22      7.5 mg         23      10 mg         24      7.5 mg         25      7.5 mg           26      7.5 mg         27      10 mg         28      7.5 mg         29      7.5 mg         30      10 mg            Date Details   11/01 This INR check               How to take your warfarin dose     To take:  7.5 mg Take 1.5 of the 5 mg tablets.    To take:  10 mg Take 2 of the 5 mg tablets.           December 2017 Details    Sun Mon Tue Wed Thu Fri Sat          1      7.5 mg          2      7.5 mg           3      7.5 mg         4      10 mg         5      7.5 mg         6            7               8               9                 10               11               12               13               14               15               16                 17               18               19               20               21               22               23                 24               25               26               27               28               29               30                 31                      Date Details   No additional details    Date of next INR:  12/6/2017         How to take your warfarin dose     To take:  7.5 mg Take 1.5 of the 5 mg tablets.    To take:  10 mg Take 2 of the 5 mg tablets.

## 2017-12-06 ENCOUNTER — ANTICOAGULATION THERAPY VISIT (OUTPATIENT)
Dept: NURSING | Facility: CLINIC | Age: 62
End: 2017-12-06
Payer: COMMERCIAL

## 2017-12-06 DIAGNOSIS — Z79.01 LONG-TERM (CURRENT) USE OF ANTICOAGULANTS: ICD-10-CM

## 2017-12-06 DIAGNOSIS — I48.20 CHRONIC ATRIAL FIBRILLATION (H): ICD-10-CM

## 2017-12-06 LAB — INR POINT OF CARE: 2.2 (ref 0.86–1.14)

## 2017-12-06 PROCEDURE — 36416 COLLJ CAPILLARY BLOOD SPEC: CPT

## 2017-12-06 PROCEDURE — 99207 ZZC NO CHARGE NURSE ONLY: CPT

## 2017-12-06 PROCEDURE — 85610 PROTHROMBIN TIME: CPT | Mod: QW

## 2017-12-06 NOTE — MR AVS SNAPSHOT
Cameron Mariano   12/6/2017 7:15 AM   Anticoagulation Therapy Visit    Description:  62 year old male   Provider:  AN ANTI COAG   Department:  An Nurse           INR as of 12/6/2017     Today's INR 2.2      Anticoagulation Summary as of 12/6/2017     INR goal 2.0-3.0   Today's INR 2.2   Full instructions 10 mg on Mon, Thu; 7.5 mg all other days   Next INR check 1/10/2018    Indications   Chronic atrial fibrillation (H) [I48.2]  Long-term (current) use of anticoagulants [Z79.01] [Z79.01]         Your next Anticoagulation Clinic appointment(s)     Franklyn 10, 2018  7:15 AM CST   Anticoagulation Visit with AN ANTI COAG   [facility]    [address] [facility] [address] [phone]              Contact Numbers     [facility]  Please call  [phone] to cancel and/or reschedule your appointment, or with any problems or questions regarding your therapy.        December 2017 Details    Sun Mon Tue Wed Thu Fri Sat          1               2                 3               4               5               6      7.5 mg   See details      7      10 mg         8      7.5 mg         9      7.5 mg           10      7.5 mg         11      10 mg         12      7.5 mg         13      7.5 mg         14      10 mg         15      7.5 mg         16      7.5 mg           17      7.5 mg         18      10 mg         19      7.5 mg         20      7.5 mg         21      10 mg         22      7.5 mg         23      7.5 mg           24      7.5 mg         25      10 mg         26      7.5 mg         27      7.5 mg         28      10 mg         29      7.5 mg         30      7.5 mg           31      7.5 mg                Date Details   12/06 This INR check               How to take your warfarin dose     To take:  7.5 mg Take 1.5 of the 5 mg tablets.    To take:  10 mg Take 2 of the 5 mg tablets.           January 2018 Details    Sun Mon Tue Wed Thu Fri Sat      1      10 mg         2       7.5 mg         3      7.5 mg         4      10 mg         5      7.5 mg         6      7.5 mg           7      7.5 mg         8      10 mg         9      7.5 mg         10            11               12               13                 14               15               16               17               18               19               20                 21               22               23               24               25               26               27                 28               29               30               31                   Date Details   No additional details    Date of next INR:  1/10/2018         How to take your warfarin dose     To take:  7.5 mg Take 1.5 of the 5 mg tablets.    To take:  10 mg Take 2 of the 5 mg tablets.

## 2017-12-06 NOTE — PROGRESS NOTES
ANTICOAGULATION FOLLOW-UP CLINIC VISIT    Patient Name:  Cameron Mariano  Date:  12/6/2017  Contact Type:  Face to Face    SUBJECTIVE:     Patient Findings     Positives No Problem Findings           OBJECTIVE    INR Protime   Date Value Ref Range Status   12/06/2017 2.2 (A) 0.86 - 1.14 Final       ASSESSMENT / PLAN  INR assessment THER    Recheck INR In: 5 WEEKS    INR Location Clinic      Anticoagulation Summary as of 12/6/2017     INR goal 2.0-3.0   Today's INR 2.2   Maintenance plan 10 mg (5 mg x 2) on Mon, Thu; 7.5 mg (5 mg x 1.5) all other days   Full instructions 10 mg on Mon, Thu; 7.5 mg all other days   Weekly total 57.5 mg   No change documented Franchesca Jarvis RN   Plan last modified Franchesca Jarvis RN (5/25/2017)   Next INR check 1/10/2018   Target end date     Indications   Chronic atrial fibrillation (H) [I48.2]  Long-term (current) use of anticoagulants [Z79.01] [Z79.01]         Anticoagulation Episode Summary     INR check location     Preferred lab     Send INR reminders to AN ANTICOAG CLINIC    Comments       Anticoagulation Care Providers     Provider Role Specialty Phone number    Pablo Benz MD [facility] [phone]            See the Encounter Report to view Anticoagulation Flowsheet and Dosing Calendar (Go to Encounters tab in chart review, and find the Anticoagulation Therapy Visit)    Dosage adjustment made based on physician directed care plan.    Franchesca Jarvis RN

## 2018-01-10 ENCOUNTER — ANTICOAGULATION THERAPY VISIT (OUTPATIENT)
Dept: NURSING | Facility: CLINIC | Age: 63
End: 2018-01-10
Payer: COMMERCIAL

## 2018-01-10 DIAGNOSIS — Z79.01 LONG-TERM (CURRENT) USE OF ANTICOAGULANTS: ICD-10-CM

## 2018-01-10 DIAGNOSIS — I48.20 CHRONIC ATRIAL FIBRILLATION (H): ICD-10-CM

## 2018-01-10 LAB — INR POINT OF CARE: 3 (ref 0.86–1.14)

## 2018-01-10 PROCEDURE — 36416 COLLJ CAPILLARY BLOOD SPEC: CPT

## 2018-01-10 PROCEDURE — 99207 ZZC NO CHARGE NURSE ONLY: CPT

## 2018-01-10 PROCEDURE — 85610 PROTHROMBIN TIME: CPT | Mod: QW

## 2018-01-10 NOTE — MR AVS SNAPSHOT
Cameron Mariano   1/10/2018 7:15 AM   Anticoagulation Therapy Visit    Description:  62 year old male   Provider:  AN ANTI COAG   Department:  An Nurse           INR as of 1/10/2018     Today's INR 3.0      Anticoagulation Summary as of 1/10/2018     INR goal 2.0-3.0   Today's INR 3.0   Full instructions 10 mg on Mon, Thu; 7.5 mg all other days   Next INR check 2/14/2018    Indications   Chronic atrial fibrillation (H) [I48.2]  Long-term (current) use of anticoagulants [Z79.01] [Z79.01]         Your next Anticoagulation Clinic appointment(s)     Franklyn 10, 2018  7:15 AM CST   Anticoagulation Visit with AN ANTI COAG   [facility]    [address] [facility] [address] [phone]            Feb 14, 2018  7:15 AM CST   Anticoagulation Visit with AN ANTI COAG   [facility]    [address] [facility] [address] [phone]              Contact Numbers     [facility]  Please call  [phone] to cancel and/or reschedule your appointment, or with any problems or questions regarding your therapy.        January 2018 Details    Sun Mon Tue Wed Thu Fri Sat      1               2               3               4               5               6                 7               8               9               10      7.5 mg   See details      11      10 mg         12      7.5 mg         13      7.5 mg           14      7.5 mg         15      10 mg         16      7.5 mg         17      7.5 mg         18      10 mg         19      7.5 mg         20      7.5 mg           21      7.5 mg         22      10 mg         23      7.5 mg         24      7.5 mg         25      10 mg         26      7.5 mg         27      7.5 mg           28      7.5 mg         29      10 mg         30      7.5 mg         31      7.5 mg             Date Details   01/10 This INR check               How to take your warfarin dose     To take:  7.5 mg Take  1.5 of the 5 mg tablets.    To take:  10 mg Take 2 of the 5 mg tablets.           February 2018 Details    Sun Mon Tue Wed Thu Fri Sat         1      10 mg         2      7.5 mg         3      7.5 mg           4      7.5 mg         5      10 mg         6      7.5 mg         7      7.5 mg         8      10 mg         9      7.5 mg         10      7.5 mg           11      7.5 mg         12      10 mg         13      7.5 mg         14            15               16               17                 18               19               20               21               22               23               24                 25               26               27               28                   Date Details   No additional details    Date of next INR:  2/14/2018         How to take your warfarin dose     To take:  7.5 mg Take 1.5 of the 5 mg tablets.    To take:  10 mg Take 2 of the 5 mg tablets.

## 2018-01-10 NOTE — PROGRESS NOTES
ANTICOAGULATION FOLLOW-UP CLINIC VISIT    Patient Name:  Cameron Mariano  Date:  1/10/2018  Contact Type:  Face to Face    SUBJECTIVE:     Patient Findings     Positives No Problem Findings           OBJECTIVE    INR Protime   Date Value Ref Range Status   01/10/2018 3.0 (A) 0.86 - 1.14 Final       ASSESSMENT / PLAN  INR assessment THER    Recheck INR In: 5 WEEKS    INR Location Clinic      Anticoagulation Summary as of 1/10/2018     INR goal 2.0-3.0   Today's INR 3.0   Maintenance plan 10 mg (5 mg x 2) on Mon, Thu; 7.5 mg (5 mg x 1.5) all other days   Full instructions 10 mg on Mon, Thu; 7.5 mg all other days   Weekly total 57.5 mg   No change documented Franchesca Jarvis RN   Plan last modified Franchesca Jarvis RN (5/25/2017)   Next INR check 2/14/2018   Target end date     Indications   Chronic atrial fibrillation (H) [I48.2]  Long-term (current) use of anticoagulants [Z79.01] [Z79.01]         Anticoagulation Episode Summary     INR check location     Preferred lab     Send INR reminders to AN ANTICOAG CLINIC    Comments       Anticoagulation Care Providers     Provider Role Specialty Phone number    Pablo Benz MD [facility] [phone]            See the Encounter Report to view Anticoagulation Flowsheet and Dosing Calendar (Go to Encounters tab in chart review, and find the Anticoagulation Therapy Visit)        Franchesca Jarvis RN

## 2018-01-17 DIAGNOSIS — Z79.01 LONG TERM CURRENT USE OF ANTICOAGULANT THERAPY: ICD-10-CM

## 2018-01-17 DIAGNOSIS — I48.20 CHRONIC ATRIAL FIBRILLATION (H): ICD-10-CM

## 2018-01-18 RX ORDER — WARFARIN SODIUM 5 MG/1
TABLET ORAL
Qty: 138 TABLET | Refills: 1 | Status: SHIPPED | OUTPATIENT
Start: 2018-01-18 | End: 2018-08-19

## 2018-02-14 ENCOUNTER — ANTICOAGULATION THERAPY VISIT (OUTPATIENT)
Dept: NURSING | Facility: CLINIC | Age: 63
End: 2018-02-14
Payer: COMMERCIAL

## 2018-02-14 ENCOUNTER — TELEPHONE (OUTPATIENT)
Dept: FAMILY MEDICINE | Facility: CLINIC | Age: 63
End: 2018-02-14

## 2018-02-14 DIAGNOSIS — I48.20 CHRONIC ATRIAL FIBRILLATION (H): ICD-10-CM

## 2018-02-14 DIAGNOSIS — Z79.01 LONG-TERM (CURRENT) USE OF ANTICOAGULANTS: ICD-10-CM

## 2018-02-14 DIAGNOSIS — I48.20 CHRONIC ATRIAL FIBRILLATION (H): Primary | ICD-10-CM

## 2018-02-14 LAB — INR POINT OF CARE: 2.8 (ref 0.86–1.14)

## 2018-02-14 PROCEDURE — 36416 COLLJ CAPILLARY BLOOD SPEC: CPT

## 2018-02-14 PROCEDURE — 85610 PROTHROMBIN TIME: CPT | Mod: QW

## 2018-02-14 PROCEDURE — 99207 ZZC NO CHARGE NURSE ONLY: CPT

## 2018-02-14 NOTE — PROGRESS NOTES
ANTICOAGULATION FOLLOW-UP CLINIC VISIT    Patient Name:  Cameron Mariaon  Date:  2/14/2018  Contact Type:  Face to Face    SUBJECTIVE:     Patient Findings     Positives No Problem Findings           OBJECTIVE    INR Protime   Date Value Ref Range Status   02/14/2018 2.8 (A) 0.86 - 1.14 Final       ASSESSMENT / PLAN  INR assessment THER    Recheck INR In: 5 WEEKS    INR Location Clinic      Anticoagulation Summary as of 2/14/2018     INR goal 2.0-3.0   Today's INR 2.8   Maintenance plan 10 mg (5 mg x 2) on Mon, Thu; 7.5 mg (5 mg x 1.5) all other days   Full instructions 10 mg on Mon, Thu; 7.5 mg all other days   Weekly total 57.5 mg   No change documented Franchesca Jarvis RN   Plan last modified Franchesca Jarvis RN (5/25/2017)   Next INR check 3/21/2018   Target end date     Indications   Chronic atrial fibrillation (H) [I48.2]  Long-term (current) use of anticoagulants [Z79.01] [Z79.01]         Anticoagulation Episode Summary     INR check location     Preferred lab     Send INR reminders to AN ANTICOAG CLINIC    Comments       Anticoagulation Care Providers     Provider Role Specialty Phone number    Pablo Benz MD [facility] [phone]            See the Encounter Report to view Anticoagulation Flowsheet and Dosing Calendar (Go to Encounters tab in chart review, and find the Anticoagulation Therapy Visit)        Franchesca Jarvis RN

## 2018-02-14 NOTE — MR AVS SNAPSHOT
Cameron Mariano   2/14/2018 7:15 AM   Anticoagulation Therapy Visit    Description:  62 year old male   Provider:  AN ANTI COAG   Department:  An Nurse           INR as of 2/14/2018     Today's INR 2.8      Anticoagulation Summary as of 2/14/2018     INR goal 2.0-3.0   Today's INR 2.8   Full instructions 10 mg on Mon, Thu; 7.5 mg all other days   Next INR check 3/21/2018    Indications   Chronic atrial fibrillation (H) [I48.2]  Long-term (current) use of anticoagulants [Z79.01] [Z79.01]         Your next Anticoagulation Clinic appointment(s)     Mar 21, 2018  7:15 AM CDT   [facility] with AN ANTI [facility]AG   [facility]    [address] [facility] [address] [phone]              Contact Numbers     [facility]  Please call  [phone] to cancel and/or reschedule your appointment, or with any problems or questions regarding your therapy.        February 2018 Details    Sun Mon Tue Wed Thu Fri Sat         1               2               3                 4               5               6               7               8               9               10                 11               12               13               14      7.5 mg   See details      15      10 mg         16      7.5 mg         17      7.5 mg           18      7.5 mg         19      10 mg         20      7.5 mg         21      7.5 mg         22      10 mg         23      7.5 mg         24      7.5 mg           25      7.5 mg         26      10 mg         27      7.5 mg         28      7.5 mg             Date Details   02/14 This INR check               How to take your warfarin dose     To take:  7.5 mg Take 1.5 of the 5 mg tablets.    To take:  10 mg Take 2 of the 5 mg tablets.           March 2018 Details    Sun Mon Tue Wed Thu Fri Sat         1      10 mg         2      7.5 mg         3      7.5 mg           4      7.5 mg         5      10 mg         6      7.5 mg         7       7.5 mg         8      10 mg         9      7.5 mg         10      7.5 mg           11      7.5 mg         12      10 mg         13      7.5 mg         14      7.5 mg         15      10 mg         16      7.5 mg         17      7.5 mg           18      7.5 mg         19      10 mg         20      7.5 mg         21            22               23               24                 25               26               27               28               29               30               31                Date Details   No additional details    Date of next INR:  3/21/2018         How to take your warfarin dose     To take:  7.5 mg Take 1.5 of the 5 mg tablets.    To take:  10 mg Take 2 of the 5 mg tablets.

## 2018-02-14 NOTE — TELEPHONE ENCOUNTER
Has the patient previously taken warfarin? yes  If yes, for what indication? Atrial fibrillation    Does the patient have any of the following indications for a higher range of 2.5-3.5:    Mitral position mechanical valve? no    Larissa-Shiley, Ball and Cage or Monoleaflet valve (regardless of position) no    Other (if yes, please explain) no    Annual INR referral needed. Order pended.  Thank you. Franchesca Jarvis RN

## 2018-03-21 ENCOUNTER — ANTICOAGULATION THERAPY VISIT (OUTPATIENT)
Dept: NURSING | Facility: CLINIC | Age: 63
End: 2018-03-21
Payer: COMMERCIAL

## 2018-03-21 DIAGNOSIS — I48.20 CHRONIC ATRIAL FIBRILLATION (H): ICD-10-CM

## 2018-03-21 DIAGNOSIS — Z79.01 LONG TERM CURRENT USE OF ANTICOAGULANT THERAPY: ICD-10-CM

## 2018-03-21 DIAGNOSIS — Z79.01 LONG-TERM (CURRENT) USE OF ANTICOAGULANTS: ICD-10-CM

## 2018-03-21 LAB — INR POINT OF CARE: 2.3 (ref 0.86–1.14)

## 2018-03-21 PROCEDURE — 85610 PROTHROMBIN TIME: CPT | Mod: QW

## 2018-03-21 PROCEDURE — 36416 COLLJ CAPILLARY BLOOD SPEC: CPT

## 2018-03-21 PROCEDURE — 99207 ZZC NO CHARGE NURSE ONLY: CPT

## 2018-03-21 NOTE — MR AVS SNAPSHOT
Cameron Mariano   3/21/2018 7:15 AM   Anticoagulation Therapy Visit    Description:  62 year old male   Provider:  AN ANTI COAG   Department:  An Nurse           INR as of 3/21/2018     Today's INR 2.3      Anticoagulation Summary as of 3/21/2018     INR goal 2.0-3.0   Today's INR 2.3   Full instructions 10 mg on Mon, Thu; 7.5 mg all other days   Next INR check 4/25/2018    Indications   Chronic atrial fibrillation (H) [I48.2]  Long-term (current) use of anticoagulants [Z79.01] [Z79.01]         Your next Anticoagulation Clinic appointment(s)     Apr 25, 2018  7:15 AM CDT   Anticoagulation [facility] with AN ANTI COAG   [facility]    [address] [facility] [address] [phone]              Contact Numbers     [facility]  Please call  [phone] to cancel and/or reschedule your appointment, or with any problems or questions regarding your therapy.        March 2018 Details    Sun Mon Tue Wed Thu Fri Sat         1               2               3                 4               5               6               7               8               9               10                 11               12               13               14               15               16               17                 18               19               20               21      7.5 mg   See details      22      10 mg         23      7.5 mg         24      7.5 mg           25      7.5 mg         26      10 mg         27      7.5 mg         28      7.5 mg         29      10 mg         30      7.5 mg         31      7.5 mg          Date Details   03/21 This INR check               How to take your warfarin dose     To take:  7.5 mg Take 1.5 of the 5 mg tablets.    To take:  10 mg Take 2 of the 5 mg tablets.           April 2018 Details    Sun Mon Tue Wed Thu Fri Sat     1      7.5 mg         2      10 mg         3      7.5 mg         4      7.5 mg         5      10 mg         6      7.5 mg          7      7.5 mg           8      7.5 mg         9      10 mg         10      7.5 mg         11      7.5 mg         12      10 mg         13      7.5 mg         14      7.5 mg           15      7.5 mg         16      10 mg         17      7.5 mg         18      7.5 mg         19      10 mg         20      7.5 mg         21      7.5 mg           22      7.5 mg         23      10 mg         24      7.5 mg         25            26               27               28                 29               30                     Date Details   No additional details    Date of next INR:  4/25/2018         How to take your warfarin dose     To take:  7.5 mg Take 1.5 of the 5 mg tablets.    To take:  10 mg Take 2 of the 5 mg tablets.

## 2018-03-21 NOTE — PROGRESS NOTES
ANTICOAGULATION FOLLOW-UP CLINIC VISIT    Patient Name:  Cameron Mariano  Date:  3/21/2018  Contact Type:  Face to Face    SUBJECTIVE:     Patient Findings     Positives No Problem Findings           OBJECTIVE    INR Protime   Date Value Ref Range Status   03/21/2018 2.3 (A) 0.86 - 1.14 Final       ASSESSMENT / PLAN  INR assessment THER    Recheck INR In: 5 WEEKS    INR Location Clinic      Anticoagulation Summary as of 3/21/2018     INR goal 2.0-3.0   Today's INR 2.3   Maintenance plan 10 mg (5 mg x 2) on Mon, Thu; 7.5 mg (5 mg x 1.5) all other days   Full instructions 10 mg on Mon, Thu; 7.5 mg all other days   Weekly total 57.5 mg   No change documented Franchesca Jarvis RN   Plan last modified Franchesca Jarvis RN (5/25/2017)   Next INR check 4/25/2018   Target end date     Indications   Chronic atrial fibrillation (H) [I48.2]  Long-term (current) use of anticoagulants [Z79.01] [Z79.01]         Anticoagulation Episode Summary     INR check location     Preferred lab     Send INR reminders to AN ANTICOAG CLINIC    Comments       Anticoagulation Care Providers     Provider Role Specialty Phone number    Pablo Benz MD [facility] [phone]            See the Encounter Report to view Anticoagulation Flowsheet and Dosing Calendar (Go to Encounters tab in chart review, and find the Anticoagulation Therapy Visit)    Dosage adjustment made based on physician directed care plan.    Franchesca Jarvis RN

## 2018-03-26 ENCOUNTER — OFFICE VISIT (OUTPATIENT)
Dept: FAMILY MEDICINE | Facility: CLINIC | Age: 63
End: 2018-03-26
Payer: COMMERCIAL

## 2018-03-26 VITALS
HEIGHT: 70 IN | BODY MASS INDEX: 26.2 KG/M2 | RESPIRATION RATE: 16 BRPM | WEIGHT: 183 LBS | TEMPERATURE: 97 F | OXYGEN SATURATION: 98 % | SYSTOLIC BLOOD PRESSURE: 109 MMHG | DIASTOLIC BLOOD PRESSURE: 71 MMHG | HEART RATE: 55 BPM

## 2018-03-26 DIAGNOSIS — I42.9 SECONDARY CARDIOMYOPATHY (H): Primary | ICD-10-CM

## 2018-03-26 DIAGNOSIS — Z79.01 LONG-TERM (CURRENT) USE OF ANTICOAGULANTS: ICD-10-CM

## 2018-03-26 DIAGNOSIS — E78.5 HYPERLIPIDEMIA LDL GOAL <130: ICD-10-CM

## 2018-03-26 DIAGNOSIS — I10 HYPERTENSION GOAL BP (BLOOD PRESSURE) < 140/90: ICD-10-CM

## 2018-03-26 DIAGNOSIS — Z12.5 SPECIAL SCREENING FOR MALIGNANT NEOPLASM OF PROSTATE: ICD-10-CM

## 2018-03-26 LAB
ALBUMIN SERPL-MCNC: 3.6 G/DL (ref 3.4–5)
ANION GAP SERPL CALCULATED.3IONS-SCNC: 6 MMOL/L (ref 3–14)
BUN SERPL-MCNC: 13 MG/DL (ref 7–30)
CALCIUM SERPL-MCNC: 8.5 MG/DL (ref 8.5–10.1)
CHLORIDE SERPL-SCNC: 103 MMOL/L (ref 94–109)
CO2 SERPL-SCNC: 30 MMOL/L (ref 20–32)
CREAT SERPL-MCNC: 0.8 MG/DL (ref 0.66–1.25)
GFR SERPL CREATININE-BSD FRML MDRD: >90 ML/MIN/1.7M2
GLUCOSE SERPL-MCNC: 75 MG/DL (ref 70–99)
PHOSPHATE SERPL-MCNC: 2 MG/DL (ref 2.5–4.5)
POTASSIUM SERPL-SCNC: 4 MMOL/L (ref 3.4–5.3)
PSA SERPL-ACNC: 0.95 UG/L (ref 0–4)
SODIUM SERPL-SCNC: 139 MMOL/L (ref 133–144)

## 2018-03-26 PROCEDURE — 80069 RENAL FUNCTION PANEL: CPT | Performed by: FAMILY MEDICINE

## 2018-03-26 PROCEDURE — 36415 COLL VENOUS BLD VENIPUNCTURE: CPT | Performed by: FAMILY MEDICINE

## 2018-03-26 PROCEDURE — 99214 OFFICE O/P EST MOD 30 MIN: CPT | Performed by: FAMILY MEDICINE

## 2018-03-26 PROCEDURE — G0103 PSA SCREENING: HCPCS | Performed by: FAMILY MEDICINE

## 2018-03-26 RX ORDER — METOPROLOL SUCCINATE 100 MG/1
100 TABLET, EXTENDED RELEASE ORAL DAILY
Qty: 90 TABLET | Refills: 1 | Status: SHIPPED | OUTPATIENT
Start: 2018-03-26 | End: 2019-05-31

## 2018-03-26 NOTE — PROGRESS NOTES
"SUBJECTIVE:  Cameron Mariano, a 62 year old male scheduled an appointment to discuss the following issues:  Follow-up htn, high cholesterol, cardiolmyopathy, ed and a.fib.  Would like psa today. No outside blood pressure readings. Exercise - active at work - lifting/walking. No chest pain or shortness of breath. Heart rate -stable. No palpitations. No active bleeding. No hematuria/dysuria. Colonoscopy 2 years ago. Emotionally doing ok.   Eye exam in fall. Cardiology - not in a couple years.   2 cups coffee. Rare pop. Limited ALCOHOL. Marriage going ok.   grandkids x3 sees   Past Medical History:   Diagnosis Date     Atrial fibrillation (H)      History of alcohol abuse      Hypertension      MEDICAL HISTORY OF -     cardioversion X 2     Other primary cardiomyopathies     Cardiomyopathy     Peyronie's disease        Past Surgical History:   Procedure Laterality Date     APPENDECTOMY         Family History   Problem Relation Age of Onset     Blood Disease Mother      blood clots     CANCER Father      stomach     Alcohol/Drug Father      smoker     HEART DISEASE Brother        Social History   Substance Use Topics     Smoking status: Never Smoker     Smokeless tobacco: Never Used     Alcohol use Yes      Comment: 3-4 beers a week       ROS:  All other ROS negative    OBJECTIVE:  /71  Pulse 55  Temp 97  F (36.1  C) (Oral)  Resp 16  Ht 5' 10\" (1.778 m)  Wt 183 lb (83 kg)  SpO2 98%  BMI 26.26 kg/m2  EXAM:  GENERAL APPEARANCE: healthy, alert and no distress  EYES: EOMI,  PERRL  HENT: ear canals and TM's normal and nose and mouth without ulcers or lesions  NECK: no adenopathy, no asymmetry, masses, or scars and thyroid normal to palpation  RESP: lungs clear to auscultation - no rales, rhonchi or wheezes  CV: IR but normal rate  ABDOMEN:  soft, nontender, no HSM or masses and bowel sounds normal  MS: extremities normal- no gross deformities noted, no evidence of inflammation in joints, FROM in all " extremities.  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 Shortness of breath/ weight gain to er or follow-up cardiology. Continue exercise. Expected course and warning signs reviewed. Call/email with questions/concerns.     (E78.5) Hyperlipidemia LDL goal <130  Comment: stable  Plan: Recheck in 6 months  Continue lipitor    (I10) Hypertension goal BP (blood pressure) < 140/90  Comment: stable  Plan: Renal panel (Alb, BUN, Ca, Cl, CO2, Creat,         Gluc, Phos, K, Na), metoprolol succinate         (TOPROL-XL) 100 MG 24 hr tablet        Continue monitor.     (Z79.01) Long-term (current) use of anticoagulants [Z79.01]  Plan: follow-up per our INR RN. Continue coumadin.  A.fib rate stable    (Z12.5) Special screening for malignant neoplasm of prostate  Plan: PSA, screen              Pablo Benz

## 2018-03-26 NOTE — NURSING NOTE
"Chief Complaint   Patient presents with     Hypertension     Lipids     Health Maintenance       Initial /71  Pulse 55  Temp 97  F (36.1  C) (Oral)  Resp 16  Ht 5' 10\" (1.778 m)  Wt 183 lb (83 kg)  SpO2 98%  BMI 26.26 kg/m2 Estimated body mass index is 26.26 kg/(m^2) as calculated from the following:    Height as of this encounter: 5' 10\" (1.778 m).    Weight as of this encounter: 183 lb (83 kg).    Breanna Cooley, CMA    "

## 2018-03-26 NOTE — MR AVS SNAPSHOT
After Visit Summary   3/26/2018    Cameron Mariano    MRN: [id number]           Patient Information     Date Of Birth          1955        Visit Information        Provider Department      3/26/2018 7:55 AM Pablo Benz MD [facility]        Today's Diagnoses     Secondary cardiomyopathy (H)    -  1    Hyperlipidemia LDL goal <130        Hypertension goal BP (blood pressure) < 140/90        Long-term (current) use of anticoagulants [Z79.01]        Special screening for malignant neoplasm of prostate           Follow-ups after your visit        Your next 10 appointments already scheduled     Apr 25, 2018  7:15 AM CDT   Anticoagulation Visit with AN ANTI COAG   [facility]    [address] [facility] [address] [phone]              Who to contact     If you have questions or need follow up information about today's clinic visit or your schedule please contact [facility] directly at [phone].  Normal or non-critical lab and imaging results will be communicated to you by [employer], letter or phone within 4 business days after the clinic has received the results. If you do not hear from us within 7 days, please contact the clinic through [employer] or phone. If you have a critical or abnormal lab result, we will notify you by phone as soon as possible.  Submit refill requests through [employer] or call your pharmacy and they will forward the refill request to us. Please allow 3 business days for your refill to be completed.          Additional Information About Your Visit        MyChart Information     [employer] gives you secure access to your electronic health record. If you see a primary care provider, you can also send messages to your care team and make appointments. If you have questions, please call your primary care clinic.  If you do not have a primary care provider, please call [phone] and they will assist  "you.        Care EveryWhere ID     This is your Care EveryWhere ID. This could be used by other organizations to access your [place] medical records  [id number]        Your Vitals Were     Pulse Temperature Respirations Height Pulse Oximetry BMI (Body Mass Index)    55 97  F (36.1  C) (Oral) 16 5' 10\" (1.778 m) 98% 26.26 kg/m2       Blood Pressure from Last 3 Encounters:   03/26/18 109/71   06/15/17 109/73   02/07/17 111/70    Weight from Last 3 Encounters:   03/26/18 183 lb (83 kg)   06/15/17 177 lb (80.3 kg)   02/07/17 187 lb (84.8 kg)              We Performed the Following     PSA, screen     Renal panel (Alb, BUN, Ca, Cl, CO2, Creat, Gluc, Phos, K, Na)          Where to get your medicines      These medications were sent to [facility] [address] [place] [address] [address] [facility]  [address] [facility] [address]     Phone:  [phone]     metoprolol succinate 100 MG 24 hr tablet          Primary Care Provider Office Phone # Fax #    Pablo Benz [phone] [address] [facility] [address]        Equal Access to Services     CLARI SCHNEIDER : Hadii loren ku hadasho Soomaali, waaxda luqadaha, qaybta kaalmada adeegyada, ti s[place] . [facility] [phone].    ATENCIÓN: Si habla español, tiene a dupont disposición servicios gratuitos de asistencia lingüística. [facility] [phone].    We comply with applicable federal civil rights laws and [place] laws. We do not discriminate on the basis of race, color, national origin, age, disability, sex, sexual orientation, or gender identity.            Thank you!     Thank you for choosing [facility]  for your care. Our goal is always to provide you with excellent care. Hearing back from our patients is one way we can continue to improve our services. Please take a few minutes to complete the written survey that you may receive in the mail after your visit with us. Thank you!           "   Your Updated Medication List - Protect others around you: Learn how to safely use, store and throw away your medicines at www.disposemymeds.org.          This list is accurate as of 3/26/18  8:37 AM.  Always use your most recent med list.                   Brand Name Dispense Instructions for use Diagnosis    aspirin 81 MG tablet      Take 1 tablet by mouth daily.        atorvastatin 40 MG tablet    LIPITOR    90 tablet    Take 1 tablet (40 mg) by mouth daily for cholesterol Pharmacy ok to hold prescription until due    Hyperlipidemia LDL goal <130       CARTIA  MG 24 hr capsule   Generic drug:  diltiazem     90 capsule    TAKE ONE CAPSULE BY MOUTH ONCE DAILY    Hypertension goal BP (blood pressure) < 140/90       fish oil-omega-3 fatty acids 1000 MG capsule      Take 2 g by mouth daily.        lisinopril 2.5 MG tablet    PRINIVIL/Zestril    90 tablet    TAKE ONE TABLET BY MOUTH ONCE DAILY    Hypertension goal BP (blood pressure) < 140/90       metoprolol succinate 100 MG 24 hr tablet    TOPROL-XL    90 tablet    Take 1 tablet (100 mg) by mouth daily Take at bedtime Pharmacy ok to hold prescription until due    Hypertension goal BP (blood pressure) < 140/90       Multi-vitamin Tabs tablet   Generic drug:  multivitamin, therapeutic with minerals      1 TABLET DAILY        sildenafil 20 MG tablet    REVATIO    20 tablet    1-2 tabs daily as needed for ed.  Never use with nitroglycerin, terazosin or doxazosin.    Erectile dysfunction due to arterial insufficiency       vitamin D 400 UNITS capsule      Take 1 capsule by mouth daily.        warfarin 5 MG tablet    COUMADIN    138 tablet    Take daily as directed. Current dose is 10 mg M,Th and 7.5 mg Thompson,tu,w,f,sa    Chronic atrial fibrillation (H), Long term current use of anticoagulant therapy

## 2018-04-06 NOTE — PATIENT INSTRUCTIONS
Acute Bacterial Rhinosinusitis (ABRS)  Acute bacterial rhinosinusitis (ABRS) is an infection of your nasal cavity and sinuses. It s caused by bacteria. Acute means that you ve had symptoms for less than 12 weeks.  Understanding your sinuses  The nasal cavity is the large air-filled space behind your nose. The sinuses are a group of spaces formed by the bones of your face. They connect with your nasal cavity. ABRS causes the tissue lining these spaces to become inflamed. Mucus may not drain normally. This leads to facial pain and other symptoms.  What causes ABRS?  ABRS most often follows an upper respiratory infection caused by a virus. Bacteria then infect the lining of your nasal cavity and sinuses. But you can also get ABRS if you have:    Nasal allergies    Long-term nasal swelling and congestion not caused by allergies    Blockage in the nose  Symptoms of ABRS  The symptoms of ABRS may be different for each person, and can include:    Nasal congestion    Runny nose    Fluid draining from the nose down the throat (postnasal drip)    Headache    Cough    Pain in the sinuses    Thick, colored fluid from the nose (mucus)    Fever  Diagnosing ABRS  ABRS may be diagnosed if you ve had an upper respiratory infection like a cold and cough for longer than 10 to 14 days. Your health care provider will ask about your symptoms and your medical history. The provider will check your vital signs, including your temperature. You ll have a physical exam. The health care provider will check your ears, nose, and throat. You likely won t need any tests. If ABRS comes back, you may have a culture or other tests.  Treatment for ABRS  Treatment may include:    Antibiotic medicine. This is for symptoms that last for at least 10 to 14 days.    Nasal corticosteroid medicine. Drops or spray used in the nose can lessen swelling and congestion.    Over-the-counter pain medicine. This is to lessen sinus pain and pressure.    Nasal  decongestant medicine. Spray or drops may help to lessen congestion. Do not use them for more than a few days.    Salt wash (saline irrigation). This can help to loosen mucus.  Possible complications of ABRS  ABRS may come back or become long-term (chronic).  In rare cases, ABRS may cause complications such as:     Inflamed tissue around the brain and spinal cord (meningitis)    Inflamed tissue around the eyes (orbital cellulitis)    Inflamed bones around the sinuses (osteitis)  These problems may need to be treated in a hospital with intravenous (IV) antibiotic medicine or surgery.  When to call the health care provider  Call your health care provider if you have any of the following:    Symptoms that don t get better, or get worse    Symptoms that don t get better after 3 to 5 days on antibiotics    Trouble seeing    Swelling around your eyes    Confusion or trouble staying awake        2513-6095 [employer]. [address] [place] [address]. All rights reserved. This information is not intended as a substitute for professional medical care. Always follow your healthcare professional's instructions.         Cartilage Graft Text: The defect edges were debeveled with a #15 scalpel blade.  Given the location of the defect, shape of the defect, the fact the defect involved a full thickness cartilage defect a cartilage graft was deemed most appropriate.  An appropriate donor site was identified, cleansed, and anesthetized. The cartilage graft was then harvested and transferred to the recipient site, oriented appropriately and then sutured into place.  The secondary defect was then repaired using a primary closure.

## 2018-04-25 ENCOUNTER — ANTICOAGULATION THERAPY VISIT (OUTPATIENT)
Dept: NURSING | Facility: CLINIC | Age: 63
End: 2018-04-25
Payer: COMMERCIAL

## 2018-04-25 DIAGNOSIS — I48.20 CHRONIC ATRIAL FIBRILLATION (H): ICD-10-CM

## 2018-04-25 DIAGNOSIS — Z79.01 LONG-TERM (CURRENT) USE OF ANTICOAGULANTS: ICD-10-CM

## 2018-04-25 LAB — INR POINT OF CARE: 3.1 (ref 0.86–1.14)

## 2018-04-25 PROCEDURE — 99207 ZZC NO CHARGE NURSE ONLY: CPT

## 2018-04-25 PROCEDURE — 85610 PROTHROMBIN TIME: CPT | Mod: QW

## 2018-04-25 PROCEDURE — 36416 COLLJ CAPILLARY BLOOD SPEC: CPT

## 2018-04-25 NOTE — PROGRESS NOTES
ANTICOAGULATION FOLLOW-UP CLINIC VISIT    Patient Name:  Cameron Mariano  Date:  4/25/2018  Contact Type:  Face to Face    SUBJECTIVE:     Patient Findings     Positives No Problem Findings           OBJECTIVE    INR Protime   Date Value Ref Range Status   04/25/2018 3.1 (A) 0.86 - 1.14 Final       ASSESSMENT / PLAN  INR assessment THER    Recheck INR In: 5 WEEKS    INR Location Clinic      Anticoagulation Summary as of 4/25/2018     INR goal 2.0-3.0   Today's INR 3.1!   Maintenance plan 10 mg (5 mg x 2) on Mon, Thu; 7.5 mg (5 mg x 1.5) all other days   Full instructions 10 mg on Mon, Thu; 7.5 mg all other days   Weekly total 57.5 mg   No change documented Franchesca Jarvis RN   Plan last modified Franchesca Jarvis RN (5/25/2017)   Next INR check 5/31/2018   Target end date     Indications   Chronic atrial fibrillation (H) [I48.2]  Long-term (current) use of anticoagulants [Z79.01] [Z79.01]         Anticoagulation Episode Summary     INR check location     Preferred lab     Send INR reminders to AN ANTICOAG CLINIC    Comments       Anticoagulation Care Providers     Provider Role Specialty Phone number    Pablo Benz MD [facility] [phone]            See the Encounter Report to view Anticoagulation Flowsheet and Dosing Calendar (Go to Encounters tab in chart review, and find the Anticoagulation Therapy Visit)    Dosage adjustment made based on physician directed care plan.    Franchesca Jarvis RN

## 2018-04-25 NOTE — MR AVS SNAPSHOT
Cameron Mariano   4/25/2018 7:15 AM   Anticoagulation Therapy Visit    Description:  62 year old male   Provider:  AN ANTI COAG   Department:  An Nurse           INR as of 4/25/2018     Today's INR 3.1!      Anticoagulation Summary as of 4/25/2018     INR goal 2.0-3.0   Today's INR 3.1!   Full instructions 10 mg on Mon, Thu; 7.5 mg all other days   Next INR check 5/31/2018    Indications   Chronic atrial fibrillation (H) [I48.2]  Long-term (current) use of anticoagulants [Z79.01] [Z79.01]         Your next Anticoagulation Clinic appointment(s)     May 31, 2018  7:15 AM CDT   [facility] AN ANTI COAG   [facility]    [address] [facility] [address] [phone]              Contact Numbers     [facility]  Please call  [phone] to cancel and/or reschedule your appointment, or with any problems or questions regarding your therapy.        April 2018 Details    Sun Mon Tue Wed Thu Fri Sat     1               2               3               4               5               6               7                 8               9               10               11               12               13               14                 15               16               17               18               19               20               21                 22               23               24               25      7.5 mg   See details      26      10 mg         27      7.5 mg         28      7.5 mg           29      7.5 mg         30      10 mg               Date Details   04/25 This INR check               How to take your warfarin dose     To take:  7.5 mg Take 1.5 of the 5 mg tablets.    To take:  10 mg Take 2 of the 5 mg tablets.           May 2018 Details    Sun Mon Tue Wed Thu Fri Sat       1      7.5 mg         2      7.5 mg         3      10 mg         4      7.5 mg         5      7.5 mg           6      7.5 mg         7      10 mg         8      7.5  mg         9      7.5 mg         10      10 mg         11      7.5 mg         12      7.5 mg           13      7.5 mg         14      10 mg         15      7.5 mg         16      7.5 mg         17      10 mg         18      7.5 mg         19      7.5 mg           20      7.5 mg         21      10 mg         22      7.5 mg         23      7.5 mg         24      10 mg         25      7.5 mg         26      7.5 mg           27      7.5 mg         28      10 mg         29      7.5 mg         30      7.5 mg         31               Date Details   No additional details    Date of next INR:  5/31/2018         How to take your warfarin dose     To take:  7.5 mg Take 1.5 of the 5 mg tablets.    To take:  10 mg Take 2 of the 5 mg tablets.

## 2018-05-31 ENCOUNTER — ANTICOAGULATION THERAPY VISIT (OUTPATIENT)
Dept: NURSING | Facility: CLINIC | Age: 63
End: 2018-05-31
Payer: COMMERCIAL

## 2018-05-31 DIAGNOSIS — I48.20 CHRONIC ATRIAL FIBRILLATION (H): ICD-10-CM

## 2018-05-31 DIAGNOSIS — Z79.01 LONG-TERM (CURRENT) USE OF ANTICOAGULANTS: ICD-10-CM

## 2018-05-31 LAB — INR POINT OF CARE: 3.4 (ref 0.86–1.14)

## 2018-05-31 PROCEDURE — 99207 ZZC NO CHARGE NURSE ONLY: CPT

## 2018-05-31 PROCEDURE — 85610 PROTHROMBIN TIME: CPT | Mod: QW

## 2018-05-31 PROCEDURE — 36416 COLLJ CAPILLARY BLOOD SPEC: CPT

## 2018-05-31 NOTE — PROGRESS NOTES
ANTICOAGULATION FOLLOW-UP CLINIC VISIT    Patient Name:  Cameron Mariano  Date:  5/31/2018  Contact Type:  Face to Face    SUBJECTIVE:     Patient Findings     Positives No Problem Findings    Comments Still supra. No concerns. Family not eating salad like before. Missed a dose in past week. Will adjust weekly dose. Will try to add a little more vit k food to diet. Call if concerns.            OBJECTIVE    INR Protime   Date Value Ref Range Status   05/31/2018 3.4 (A) 0.86 - 1.14 Final       ASSESSMENT / PLAN  INR assessment SUPRA    Recheck INR In: 4 WEEKS    INR Location Clinic      Anticoagulation Summary as of 5/31/2018     INR goal 2.0-3.0   Today's INR 3.4!   Warfarin maintenance plan 10 mg (5 mg x 2) on Thu; 7.5 mg (5 mg x 1.5) all other days   Full warfarin instructions 10 mg on Thu; 7.5 mg all other days   Weekly warfarin total 55 mg   Plan last modified Franchesca Jarvis RN (5/31/2018)   Next INR check 6/28/2018   Target end date     Indications   Chronic atrial fibrillation (H) [I48.2]  Long-term (current) use of anticoagulants [Z79.01] [Z79.01]         Anticoagulation Episode Summary     INR check location     Preferred lab     Send INR reminders to AN ANTICOAG CLINIC    Comments       Anticoagulation Care Providers     Provider Role Specialty Phone number    Pablo Benz MD [facility] [phone]            See the Encounter Report to view Anticoagulation Flowsheet and Dosing Calendar (Go to Encounters tab in chart review, and find the Anticoagulation Therapy Visit)        Franchesca Jarvis RN

## 2018-05-31 NOTE — MR AVS SNAPSHOT
Cameron Mariano   5/31/2018 7:15 AM   Anticoagulation Therapy Visit    Description:  62 year old male   Provider:  AN ANTI COAG   Department:  An Nurse           INR as of 5/31/2018     Today's INR 3.4!      Anticoagulation Summary as of 5/31/2018     INR goal 2.0-3.0   Today's INR 3.4!   Full warfarin instructions 10 mg on Thu; 7.5 mg all other days   Next INR check 6/28/2018    Indications   Chronic atrial fibrillation (H) [I48.2]  Long-term (current) use of anticoagulants [Z79.01] [Z79.01]         Your next Anticoagulation Clinic appointment(s)     Jun 28, 2018  7:15 AM CDT   Anticoagulation Visit with AN ANTI COAG   [facility] ([facility])    [address] [facility] [address] [phone]              Contact Numbers     [facility]  Please call  [phone] to cancel and/or reschedule your appointment, or with any problems or questions regarding your therapy.        May 2018 Details    Sun Mon Tue Wed Thu Fri Sat       1               2               3               4               5                 6               7               8               9               10               11               12                 13               14               15               16               17               18               19                 20               21               22               23               24               25               26                 27               28               29               30               31      10 mg   See details         Date Details   05/31 This INR check               How to take your warfarin dose     To take:  10 mg Take 2 of the 5 mg tablets.           June 2018 Details    Sun Mon Tue Wed Thu Fri Sat          1      7.5 mg         2      7.5 mg           3      7.5 mg         4      7.5 mg         5      7.5 mg         6      7.5 mg         7      10 mg         8      7.5 mg         9      7.5 mg           10      7.5 mg          11      7.5 mg         12      7.5 mg         13      7.5 mg         14      10 mg         15      7.5 mg         16      7.5 mg           17      7.5 mg         18      7.5 mg         19      7.5 mg         20      7.5 mg         21      10 mg         22      7.5 mg         23      7.5 mg           24      7.5 mg         25      7.5 mg         26      7.5 mg         27      7.5 mg         28            29               30                Date Details   No additional details    Date of next INR:  6/28/2018         How to take your warfarin dose     To take:  7.5 mg Take 1.5 of the 5 mg tablets.    To take:  10 mg Take 2 of the 5 mg tablets.

## 2018-06-28 ENCOUNTER — ANTICOAGULATION THERAPY VISIT (OUTPATIENT)
Dept: NURSING | Facility: CLINIC | Age: 63
End: 2018-06-28
Payer: COMMERCIAL

## 2018-06-28 DIAGNOSIS — I48.20 CHRONIC ATRIAL FIBRILLATION (H): ICD-10-CM

## 2018-06-28 DIAGNOSIS — Z79.01 LONG-TERM (CURRENT) USE OF ANTICOAGULANTS: ICD-10-CM

## 2018-06-28 LAB — INR POINT OF CARE: 1.9 (ref 0.86–1.14)

## 2018-06-28 PROCEDURE — 85610 PROTHROMBIN TIME: CPT | Mod: QW

## 2018-06-28 PROCEDURE — 36416 COLLJ CAPILLARY BLOOD SPEC: CPT

## 2018-06-28 PROCEDURE — 99207 ZZC NO CHARGE NURSE ONLY: CPT

## 2018-06-28 NOTE — MR AVS SNAPSHOT
Cameron Mariano   6/28/2018 7:15 AM   Anticoagulation Therapy Visit    Description:  62 year old male   Provider:  AN ANTI COAG   Department:  An Nurse           INR as of 6/28/2018     Today's INR 1.9!      Anticoagulation Summary as of 6/28/2018     INR goal 2.0-3.0   Today's INR 1.9!   Full warfarin instructions 10 mg on Thu; 7.5 mg all other days   Next INR check 8/2/2018    Indications   Chronic atrial fibrillation (H) [I48.2]  Long-term (current) use of anticoagulants [Z79.01] [Z79.01]         Your next Anticoagulation Clinic appointment(s)     Aug 02, 2018  7:15 AM CDT   Anticoagulation Visit with AN ANTI COAG   [facility]    [address] [facility] [address] [phone]              Contact Numbers     [facility]  Please call  [phone] to cancel and/or reschedule your appointment, or with any problems or questions regarding your therapy.        June 2018 Details    Sun Mon Tue Wed Thu Fri Sat          1               2                 3               4               5               6               7               8               9                 10               11               12               13               14               15               16                 17               18               19               20               21               22               23                 24               25               26               27               28      10 mg   See details      29      7.5 mg         30      7.5 mg          Date Details   06/28 This INR check               How to take your warfarin dose     To take:  7.5 mg Take 1.5 of the 5 mg tablets.    To take:  10 mg Take 2 of the 5 mg tablets.           July 2018 Details    Sun Mon Tue Wed Thu Fri Sat     1      7.5 mg         2      7.5 mg         3      7.5 mg         4      7.5 mg         5      10 mg         6      7.5 mg         7      7.5 mg           8      7.5 mg         9       7.5 mg         10      7.5 mg         11      7.5 mg         12      10 mg         13      7.5 mg         14      7.5 mg           15      7.5 mg         16      7.5 mg         17      7.5 mg         18      7.5 mg         19      10 mg         20      7.5 mg         21      7.5 mg           22      7.5 mg         23      7.5 mg         24      7.5 mg         25      7.5 mg         26      10 mg         27      7.5 mg         28      7.5 mg           29      7.5 mg         30      7.5 mg         31      7.5 mg              Date Details   No additional details            How to take your warfarin dose     To take:  7.5 mg Take 1.5 of the 5 mg tablets.    To take:  10 mg Take 2 of the 5 mg tablets.           August 2018 Details    Sun Mon Tue Wed Thu Fri Sat        1      7.5 mg         2            3               4                 5               6               7               8               9               10               11                 12               13               14               15               16               17               18                 19               20               21               22               23               24               25                 26               27               28               29               30               31                 Date Details   No additional details    Date of next INR:  8/2/2018         How to take your warfarin dose     To take:  7.5 mg Take 1.5 of the 5 mg tablets.    To take:  10 mg Take 2 of the 5 mg tablets.

## 2018-06-28 NOTE — PROGRESS NOTES
ANTICOAGULATION FOLLOW-UP CLINIC VISIT    Patient Name:  Cameron Mariano  Date:  6/28/2018  Contact Type:  Face to Face    SUBJECTIVE:     Patient Findings     Positives No Problem Findings    Comments INR 1.9. Dose was decreased at last visit. Reports has had more salad than usual. No missed dose. Continue same.            OBJECTIVE    INR Protime   Date Value Ref Range Status   06/28/2018 1.9 (A) 0.86 - 1.14 Final       ASSESSMENT / PLAN  INR assessment SUB    Recheck INR In: 5 WEEKS    INR Location Clinic      Anticoagulation Summary as of 6/28/2018     INR goal 2.0-3.0   Today's INR 1.9!   Warfarin maintenance plan 10 mg (5 mg x 2) on Thu; 7.5 mg (5 mg x 1.5) all other days   Full warfarin instructions 10 mg on Thu; 7.5 mg all other days   Weekly warfarin total 55 mg   No change documented Franchesca Jarvis RN   Plan last modified Franchesca Jarvis RN (5/31/2018)   Next INR check 8/2/2018   Target end date     Indications   Chronic atrial fibrillation (H) [I48.2]  Long-term (current) use of anticoagulants [Z79.01] [Z79.01]         Anticoagulation Episode Summary     INR check location     Preferred lab     Send INR reminders to AN ANTICOAG CLINIC    Comments       Anticoagulation Care Providers     Provider Role Specialty Phone number    Pablo Benz MD [facility] [phone]            See the Encounter Report to view Anticoagulation Flowsheet and Dosing Calendar (Go to Encounters tab in chart review, and find the Anticoagulation Therapy Visit)        Franchesca Jarvis RN

## 2018-08-02 ENCOUNTER — ANTICOAGULATION THERAPY VISIT (OUTPATIENT)
Dept: NURSING | Facility: CLINIC | Age: 63
End: 2018-08-02
Payer: COMMERCIAL

## 2018-08-02 DIAGNOSIS — Z79.01 LONG-TERM (CURRENT) USE OF ANTICOAGULANTS: ICD-10-CM

## 2018-08-02 DIAGNOSIS — I48.20 CHRONIC ATRIAL FIBRILLATION (H): ICD-10-CM

## 2018-08-02 LAB — INR POINT OF CARE: 2.4 (ref 0.86–1.14)

## 2018-08-02 PROCEDURE — 36416 COLLJ CAPILLARY BLOOD SPEC: CPT

## 2018-08-02 PROCEDURE — 85610 PROTHROMBIN TIME: CPT | Mod: QW

## 2018-08-02 PROCEDURE — 99207 ZZC NO CHARGE NURSE ONLY: CPT

## 2018-08-02 NOTE — MR AVS SNAPSHOT
Cameron Mariano   8/2/2018 7:15 AM   Anticoagulation Therapy Visit    Description:  63 year old male   Provider:  AN ANTI COAG   Department:  An Nurse           INR as of 8/2/2018     Today's INR 2.4      Anticoagulation Summary as of 8/2/2018     INR goal 2.0-3.0   Today's INR 2.4   Full warfarin instructions 10 mg on Thu; 7.5 mg all other days   Next INR check 9/6/2018    Indications   Chronic atrial fibrillation (H) [I48.2]  Long-term (current) use of anticoagulants [Z79.01] [Z79.01]         Your next Anticoagulation Clinic appointment(s)     Sep 06, 2018  7:15 AM CDT   Anticoagulation Visit with AN ANTI COAG   [facility] ([facility])    [address] [facility] [address] [phone]              Contact Numbers     [facility]  Please call  [phone] to cancel and/or reschedule your appointment, or with any problems or questions regarding your therapy.        August 2018 Details    Sun Mon Tue Wed Thu Fri Sat        1               2      10 mg   See details      3      7.5 mg         4      7.5 mg           5      7.5 mg         6      7.5 mg         7      7.5 mg         8      7.5 mg         9      10 mg         10      7.5 mg         11      7.5 mg           12      7.5 mg         13      7.5 mg         14      7.5 mg         15      7.5 mg         16      10 mg         17      7.5 mg         18      7.5 mg           19      7.5 mg         20      7.5 mg         21      7.5 mg         22      7.5 mg         23      10 mg         24      7.5 mg         25      7.5 mg           26      7.5 mg         27      7.5 mg         28      7.5 mg         29      7.5 mg         30      10 mg         31      7.5 mg           Date Details   08/02 This INR check               How to take your warfarin dose     To take:  7.5 mg Take 1.5 of the 5 mg tablets.    To take:  10 mg Take 2 of the 5 mg tablets.           September 2018 Details    Sun Mon Tue Wed Thu Fri Sat            1      7.5 mg           2      7.5 mg         3      7.5 mg         4      7.5 mg         5      7.5 mg         6            7               8                 9               10               11               12               13               14               15                 16               17               18               19               20               21               22                 23               24               25               26               27               28               29                 30                      Date Details   No additional details    Date of next INR:  9/6/2018         How to take your warfarin dose     To take:  7.5 mg Take 1.5 of the 5 mg tablets.    To take:  10 mg Take 2 of the 5 mg tablets.

## 2018-08-02 NOTE — PROGRESS NOTES
ANTICOAGULATION FOLLOW-UP CLINIC VISIT    Patient Name:  Cameron Mariano  Date:  8/2/2018  Contact Type:  Face to Face    SUBJECTIVE:     Patient Findings     Positives No Problem Findings           OBJECTIVE    INR Protime   Date Value Ref Range Status   08/02/2018 2.4 (A) 0.86 - 1.14 Final       ASSESSMENT / PLAN  INR assessment THER    Recheck INR In: 5 WEEKS    INR Location Clinic      Anticoagulation Summary as of 8/2/2018     INR goal 2.0-3.0   Today's INR 2.4   Warfarin maintenance plan 10 mg (5 mg x 2) on Thu; 7.5 mg (5 mg x 1.5) all other days   Full warfarin instructions 10 mg on Thu; 7.5 mg all other days   Weekly warfarin total 55 mg   No change documented Franchesca Jarvis RN   Plan last modified Franchesca Jarvis RN (5/31/2018)   Next INR check 9/6/2018   Target end date     Indications   Chronic atrial fibrillation (H) [I48.2]  Long-term (current) use of anticoagulants [Z79.01] [Z79.01]         Anticoagulation Episode Summary     INR check location     Preferred lab     Send INR reminders to AN ANTICOAG CLINIC    Comments       Anticoagulation Care Providers     Provider Role Specialty Phone number    Pablo Benz MD [facility] [phone]            See the Encounter Report to view Anticoagulation Flowsheet and Dosing Calendar (Go to Encounters tab in chart review, and find the Anticoagulation Therapy Visit)        Franchesca Jarvis RN

## 2018-08-19 DIAGNOSIS — Z79.01 LONG TERM CURRENT USE OF ANTICOAGULANT THERAPY: ICD-10-CM

## 2018-08-19 DIAGNOSIS — I48.20 CHRONIC ATRIAL FIBRILLATION (H): ICD-10-CM

## 2018-08-20 RX ORDER — WARFARIN SODIUM 5 MG/1
TABLET ORAL
Qty: 138 TABLET | Refills: 0 | Status: SHIPPED | OUTPATIENT
Start: 2018-08-20 | End: 2018-12-29

## 2018-09-06 ENCOUNTER — ANTICOAGULATION THERAPY VISIT (OUTPATIENT)
Dept: NURSING | Facility: CLINIC | Age: 63
End: 2018-09-06
Payer: COMMERCIAL

## 2018-09-06 DIAGNOSIS — I48.20 CHRONIC ATRIAL FIBRILLATION (H): ICD-10-CM

## 2018-09-06 DIAGNOSIS — Z79.01 LONG-TERM (CURRENT) USE OF ANTICOAGULANTS: ICD-10-CM

## 2018-09-06 LAB — INR POINT OF CARE: 2.1 (ref 0.86–1.14)

## 2018-09-06 PROCEDURE — 85610 PROTHROMBIN TIME: CPT | Mod: QW

## 2018-09-06 PROCEDURE — 36416 COLLJ CAPILLARY BLOOD SPEC: CPT

## 2018-09-06 PROCEDURE — 99207 ZZC NO CHARGE NURSE ONLY: CPT

## 2018-09-06 NOTE — MR AVS SNAPSHOT
Cameron Mariano   9/6/2018 7:15 AM   Anticoagulation Therapy Visit    Description:  63 year old male   Provider:  AN ANTI COAG   Department:  An Nurse           INR as of 9/6/2018     Today's INR 2.1      Anticoagulation Summary as of 9/6/2018     INR goal 2.0-3.0   Today's INR 2.1   Full warfarin instructions 10 mg on Thu; 7.5 mg all other days   Next INR check 10/11/2018    Indications   Chronic atrial fibrillation (H) [I48.2]  Long-term (current) use of anticoagulants [Z79.01] [Z79.01]         Your next Anticoagulation Clinic appointment(s)     Oct 11, 2018  7:15 AM CDT   Anticoagulation Visit with AN ANTI COAG   [facility]    [address] [facility] [address] [phone]              Contact Numbers     [facility]  Please call  [phone] to cancel and/or reschedule your appointment, or with any problems or questions regarding your therapy.        September 2018 Details    Sun Mon Tue Wed Thu Fri Sat           1                 2               3               4               5               6      10 mg   See details      7      7.5 mg         8      7.5 mg           9      7.5 mg         10      7.5 mg         11      7.5 mg         12      7.5 mg         13      10 mg         14      7.5 mg         15      7.5 mg           16      7.5 mg         17      7.5 mg         18      7.5 mg         19      7.5 mg         20      10 mg         21      7.5 mg         22      7.5 mg           23      7.5 mg         24      7.5 mg         25      7.5 mg         26      7.5 mg         27      10 mg         28      7.5 mg         29      7.5 mg           30      7.5 mg                Date Details   09/06 This INR check               How to take your warfarin dose     To take:  7.5 mg Take 1.5 of the 5 mg tablets.    To take:  10 mg Take 2 of the 5 mg tablets.           October 2018 Details    Sun Mon Tue Wed Thu Fri Sat      1      7.5 mg         2      7.5 mg          3      7.5 mg         4      10 mg         5      7.5 mg         6      7.5 mg           7      7.5 mg         8      7.5 mg         9      7.5 mg         10      7.5 mg         11            12               13                 14               15               16               17               18               19               20                 21               22               23               24               25               26               27                 28               29               30               31                   Date Details   No additional details    Date of next INR:  10/11/2018         How to take your warfarin dose     To take:  7.5 mg Take 1.5 of the 5 mg tablets.    To take:  10 mg Take 2 of the 5 mg tablets.

## 2018-09-06 NOTE — PROGRESS NOTES
ANTICOAGULATION FOLLOW-UP CLINIC VISIT    Patient Name:  Cameron Mariano  Date:  9/6/2018  Contact Type:  Face to Face    SUBJECTIVE:     Patient Findings     Positives No Problem Findings           OBJECTIVE    INR Protime   Date Value Ref Range Status   09/06/2018 2.1 (A) 0.86 - 1.14 Final       ASSESSMENT / PLAN  INR assessment THER    Recheck INR In: 4 WEEKS    INR Location Clinic      Anticoagulation Summary as of 9/6/2018     INR goal 2.0-3.0   Today's INR 2.1   Warfarin maintenance plan 10 mg (5 mg x 2) on Thu; 7.5 mg (5 mg x 1.5) all other days   Full warfarin instructions 10 mg on Thu; 7.5 mg all other days   Weekly warfarin total 55 mg   No change documented Franchesca Jarvis RN   Plan last modified Franchesca Jarvis RN (5/31/2018)   Next INR check 10/11/2018   Target end date     Indications   Chronic atrial fibrillation (H) [I48.2]  Long-term (current) use of anticoagulants [Z79.01] [Z79.01]         Anticoagulation Episode Summary     INR check location     Preferred lab     Send INR reminders to AN ANTICOAG CLINIC    Comments       Anticoagulation Care Providers     Provider Role Specialty Phone number    Pablo Benz MD [facility] [phone]            See the Encounter Report to view Anticoagulation Flowsheet and Dosing Calendar (Go to Encounters tab in chart review, and find the Anticoagulation Therapy Visit)        Franchesca Jarvis RN

## 2018-10-11 ENCOUNTER — ANTICOAGULATION THERAPY VISIT (OUTPATIENT)
Dept: NURSING | Facility: CLINIC | Age: 63
End: 2018-10-11
Payer: COMMERCIAL

## 2018-10-11 DIAGNOSIS — I48.20 CHRONIC ATRIAL FIBRILLATION (H): ICD-10-CM

## 2018-10-11 LAB — INR POINT OF CARE: 3.2 (ref 0.86–1.14)

## 2018-10-11 PROCEDURE — 99207 ZZC NO CHARGE NURSE ONLY: CPT

## 2018-10-11 PROCEDURE — 36416 COLLJ CAPILLARY BLOOD SPEC: CPT

## 2018-10-11 PROCEDURE — 85610 PROTHROMBIN TIME: CPT | Mod: QW

## 2018-10-11 NOTE — PROGRESS NOTES
ANTICOAGULATION FOLLOW-UP CLINIC VISIT    Patient Name:  Cameron Mariano  Date:  10/11/2018  Contact Type:  Face to Face    SUBJECTIVE:     Patient Findings     Positives No Problem Findings    Comments No concerns. A little less salad.            OBJECTIVE    INR Protime   Date Value Ref Range Status   10/11/2018 3.2 (A) 0.86 - 1.14 Final       ASSESSMENT / PLAN  INR assessment SUPRA    Recheck INR In: 4 WEEKS    INR Location Clinic      Anticoagulation Summary as of 10/11/2018     INR goal 2.0-3.0   Today's INR 3.2!   Warfarin maintenance plan 10 mg (5 mg x 2) on Thu; 7.5 mg (5 mg x 1.5) all other days   Full warfarin instructions 10 mg on Thu; 7.5 mg all other days   Weekly warfarin total 55 mg   No change documented Franchesca Jarvis RN   Plan last modified Franchesca Jarvis RN (5/31/2018)   Next INR check 11/5/2018   Target end date     Indications   Chronic atrial fibrillation (H) [I48.2]  Long-term (current) use of anticoagulants [Z79.01] [Z79.01]         Anticoagulation Episode Summary     INR check location     Preferred lab     Send INR reminders to AN ANTICOAG CLINIC    Comments       Anticoagulation Care Providers     Provider Role Specialty Phone number    Pablo Benz MD [facility] [phone]            See the Encounter Report to view Anticoagulation Flowsheet and Dosing Calendar (Go to Encounters tab in chart review, and find the Anticoagulation Therapy Visit)        Franchesca Jarvis RN

## 2018-10-11 NOTE — MR AVS SNAPSHOT
Cameron Mariano   10/11/2018 7:15 AM   Anticoagulation Therapy Visit    Description:  63 year old male   Provider:  AN ANTI COAG   Department:  An Nurse           INR as of 10/11/2018     Today's INR 3.2!      Anticoagulation Summary as of 10/11/2018     INR goal 2.0-3.0   Today's INR 3.2!   Full warfarin instructions 10 mg on Thu; 7.5 mg all other days   Next INR check 11/5/2018    Indications   Chronic atrial fibrillation (H) [I48.2]  Long-term (current) use of anticoagulants [Z79.01] [Z79.01]         Your next Anticoagulation Clinic appointment(s)     Nov 05, 2018  7:15 AM CST   Anticoagulation [facility] with AN ANTI COAG   [facility]    [address] [facility] [address] [phone]              Contact Numbers     [facility]  Please call  [phone] to cancel and/or reschedule your appointment, or with any problems or questions regarding your therapy.        October 2018 Details    Sun Mon Tue Wed Thu Fri Sat      1               2               3               4               5               6                 7               8               9               10               11      10 mg   See details      12      7.5 mg         13      7.5 mg           14      7.5 mg         15      7.5 mg         16      7.5 mg         17      7.5 mg         18      10 mg         19      7.5 mg         20      7.5 mg           21      7.5 mg         22      7.5 mg         23      7.5 mg         24      7.5 mg         25      10 mg         26      7.5 mg         27      7.5 mg           28      7.5 mg         29      7.5 mg         30      7.5 mg         31      7.5 mg             Date Details   10/11 This INR check               How to take your warfarin dose     To take:  7.5 mg Take 1.5 of the 5 mg tablets.    To take:  10 mg Take 2 of the 5 mg tablets.           November 2018 Details    Sun Mon Tue Wed Thu Fri Sat         1      10 mg         2      7.5 mg         3       7.5 mg           4      7.5 mg         5            6               7               8               9               10                 11               12               13               14               15               16               17                 18               19               20               21               22               23               24                 25               26               27               28               29               30                 Date Details   No additional details    Date of next INR:  11/5/2018         How to take your warfarin dose     To take:  7.5 mg Take 1.5 of the 5 mg tablets.    To take:  10 mg Take 2 of the 5 mg tablets.

## 2018-11-05 ENCOUNTER — ANTICOAGULATION THERAPY VISIT (OUTPATIENT)
Dept: NURSING | Facility: CLINIC | Age: 63
End: 2018-11-05
Payer: COMMERCIAL

## 2018-11-05 DIAGNOSIS — I48.20 CHRONIC ATRIAL FIBRILLATION (H): ICD-10-CM

## 2018-11-05 DIAGNOSIS — Z23 NEED FOR PROPHYLACTIC VACCINATION AND INOCULATION AGAINST INFLUENZA: Primary | ICD-10-CM

## 2018-11-05 LAB — INR POINT OF CARE: 2.4 (ref 0.86–1.14)

## 2018-11-05 PROCEDURE — 90682 RIV4 VACC RECOMBINANT DNA IM: CPT

## 2018-11-05 PROCEDURE — 36416 COLLJ CAPILLARY BLOOD SPEC: CPT

## 2018-11-05 PROCEDURE — 85610 PROTHROMBIN TIME: CPT | Mod: QW

## 2018-11-05 PROCEDURE — 90471 IMMUNIZATION ADMIN: CPT

## 2018-11-05 PROCEDURE — 99207 ZZC NO CHARGE NURSE ONLY: CPT

## 2018-11-05 NOTE — PROGRESS NOTES
ANTICOAGULATION FOLLOW-UP CLINIC VISIT    Patient Name:  Cameron Mariano  Date:  11/5/2018  Contact Type:  Face to Face    SUBJECTIVE:     Patient Findings     Positives No Problem Findings           OBJECTIVE    INR Protime   Date Value Ref Range Status   11/05/2018 2.4 (A) 0.86 - 1.14 Final       ASSESSMENT / PLAN  INR assessment THER    Recheck INR In: 4 WEEKS    INR Location Clinic      Anticoagulation Summary as of 11/5/2018     INR goal 2.0-3.0   Today's INR 2.4   Warfarin maintenance plan 10 mg (5 mg x 2) on Thu; 7.5 mg (5 mg x 1.5) all other days   Full warfarin instructions 10 mg on Thu; 7.5 mg all other days   Weekly warfarin total 55 mg   No change documented Franchesca Jarvis RN   Plan last modified Franchesca Jarvis RN (5/31/2018)   Next INR check 12/3/2018   Target end date     Indications   Chronic atrial fibrillation (H) [I48.2]  Long-term (current) use of anticoagulants [Z79.01] [Z79.01]         Anticoagulation Episode Summary     INR check location     Preferred lab     Send INR reminders to AN ANTICOAG CLINIC    Comments       Anticoagulation Care Providers     Provider Role Specialty Phone number    Pablo Benz MD [facility] [phone]            See the Encounter Report to view Anticoagulation Flowsheet and Dosing Calendar (Go to Encounters tab in chart review, and find the Anticoagulation Therapy Visit)        Franchesca Jarvis RN                 Injectable Influenza Immunization Documentation    1.  Is the person to be vaccinated sick today?   No    2. Does the person to be vaccinated have an allergy to a component   of the vaccine?   No  Egg Allergy Algorithm Link    3. Has the person to be vaccinated ever had a serious reaction   to influenza vaccine in the past?   No    4. Has the person to be vaccinated ever had Guillain-Barré syndrome?   No    Form completed by Franchesca Jarvis RN

## 2018-11-05 NOTE — MR AVS SNAPSHOT
Cameron Mariano   11/5/2018 7:15 AM   Anticoagulation Therapy Visit    Description:  63 year old male   Provider:  AN ANTI COAG   Department:  An Nurse           INR as of 11/5/2018     Today's INR 2.4      Anticoagulation Summary as of 11/5/2018     INR goal 2.0-3.0   Today's INR 2.4   Full warfarin instructions 10 mg on Thu; 7.5 mg all other days   Next INR check 12/3/2018    Indications   Chronic atrial fibrillation (H) [I48.2]  Long-term (current) use of anticoagulants [Z79.01] [Z79.01]         Your next Anticoagulation Clinic appointment(s)     Dec 05, 2018  7:15 AM CST   Anticoagulation Visit with AN [facility]   [facility]    [address] [facility] [address] [phone]              Contact Numbers     [facility]  Please call  [phone] to cancel and/or reschedule your appointment, or with any problems or questions regarding your therapy.        November 2018 Details    Sun Mon Tue Wed Thu Fri Sat         1               2               3                 4               5      7.5 mg   See details      6      7.5 mg         7      7.5 mg         8      10 mg         9      7.5 mg         10      7.5 mg           11      7.5 mg         12      7.5 mg         13      7.5 mg         14      7.5 mg         15      10 mg         16      7.5 mg         17      7.5 mg           18      7.5 mg         19      7.5 mg         20      7.5 mg         21      7.5 mg         22      10 mg         23      7.5 mg         24      7.5 mg           25      7.5 mg         26      7.5 mg         27      7.5 mg         28      7.5 mg         29      10 mg         30      7.5 mg           Date Details   11/05 This INR check               How to take your warfarin dose     To take:  7.5 mg Take 1.5 of the 5 mg tablets.    To take:  10 mg Take 2 of the 5 mg tablets.           December 2018 Details    Sun Mon Tue Wed Thu Fri Sat           1      7.5 mg           2      7.5  mg         3            4               5               6               7               8                 9               10               11               12               13               14               15                 16               17               18               19               20               21               22                 23               24               25               26               27               28               29                 30               31                     Date Details   No additional details    Date of next INR:  12/3/2018         How to take your warfarin dose     To take:  7.5 mg Take 1.5 of the 5 mg tablets.

## 2018-11-24 DIAGNOSIS — N52.01 ERECTILE DYSFUNCTION DUE TO ARTERIAL INSUFFICIENCY: ICD-10-CM

## 2018-11-26 RX ORDER — SILDENAFIL CITRATE 20 MG/1
TABLET ORAL
Qty: 20 TABLET | Refills: 3 | Status: SHIPPED | OUTPATIENT
Start: 2018-11-26 | End: 2019-05-31

## 2018-12-05 ENCOUNTER — ANTICOAGULATION THERAPY VISIT (OUTPATIENT)
Dept: NURSING | Facility: CLINIC | Age: 63
End: 2018-12-05
Payer: COMMERCIAL

## 2018-12-05 DIAGNOSIS — I48.20 CHRONIC ATRIAL FIBRILLATION (H): ICD-10-CM

## 2018-12-05 LAB — INR POINT OF CARE: 1.8 (ref 0.86–1.14)

## 2018-12-05 PROCEDURE — 36416 COLLJ CAPILLARY BLOOD SPEC: CPT

## 2018-12-05 PROCEDURE — 99207 ZZC NO CHARGE NURSE ONLY: CPT

## 2018-12-05 PROCEDURE — 85610 PROTHROMBIN TIME: CPT | Mod: QW

## 2018-12-05 NOTE — PROGRESS NOTES
ANTICOAGULATION FOLLOW-UP CLINIC VISIT    Patient Name:  Cameron Mariano  Date:  12/5/2018  Contact Type:  Face to Face    SUBJECTIVE:     Patient Findings     Positives Unexplained INR or factor level change    Comments Some swelling in right side of face cheek area. Reports he has allergies and reacted to something last night. States the swelling has gone down overnight and has taken antihistamine. Reports no other concerns and no changes. Is eating salads. Dose adjusted today.            OBJECTIVE    INR Protime   Date Value Ref Range Status   12/05/2018 1.8 (A) 0.86 - 1.14 Final       ASSESSMENT / PLAN  INR assessment SUB    Recheck INR In: 4 WEEKS    INR Location Clinic      Anticoagulation Summary as of 12/5/2018     INR goal 2.0-3.0   Today's INR 1.8!   Warfarin maintenance plan 10 mg (5 mg x 2) on Thu; 7.5 mg (5 mg x 1.5) all other days   Full warfarin instructions 12/5: 10 mg; Otherwise 10 mg on Thu; 7.5 mg all other days   Weekly warfarin total 55 mg   Plan last modified Franchesca Jarvis RN (5/31/2018)   Next INR check 1/2/2019   Target end date     Indications   Chronic atrial fibrillation (H) [I48.2]  Long-term (current) use of anticoagulants [Z79.01] [Z79.01]         Anticoagulation Episode Summary     INR check location     Preferred lab     Send INR reminders to [facility]    Comments       Anticoagulation Care Providers     Provider Role Specialty Phone number    Pablo Benz MD [facility] [phone]            See the Encounter Report to view Anticoagulation Flowsheet and Dosing Calendar (Go to Encounters tab in chart review, and find the Anticoagulation Therapy Visit)        Franchesca Jarvis RN

## 2018-12-05 NOTE — MR AVS SNAPSHOT
Cameron Mariano   12/5/2018 7:15 AM   Anticoagulation Therapy Visit    Description:  63 year old male   Provider:  AN ANTI COAG   Department:  An Nurse           INR as of 12/5/2018     Today's INR 1.8!      Anticoagulation Summary as of 12/5/2018     INR goal 2.0-3.0   Today's INR 1.8!   Full warfarin instructions 12/5: 10 mg; Otherwise 10 mg on Thu; 7.5 mg all other days   Next INR check 1/2/2019    Indications   Chronic atrial fibrillation (H) [I48.2]  Long-term (current) use of anticoagulants [Z79.01] [Z79.01]         Your next Anticoagulation Clinic appointment(s)     Jan 03, 2019  7:15 AM CST   Anticoagulation Visit with AN ANTI COAG   [facility]    [address] [facility] [address] [phone]              Contact Numbers     [facility]  Please call  [phone] to cancel and/or reschedule your appointment, or with any problems or questions regarding your therapy.        December 2018 Details    Sun Mon Tue Wed Thu Fri Sat           1                 2               3               4               5      10 mg   See details      6      10 mg         7      7.5 mg         8      7.5 mg           9      7.5 mg         10      7.5 mg         11      7.5 mg         12      7.5 mg         13      10 mg         14      7.5 mg         15      7.5 mg           16      7.5 mg         17      7.5 mg         18      7.5 mg         19      7.5 mg         20      10 mg         21      7.5 mg         22      7.5 mg           23      7.5 mg         24      7.5 mg         25      7.5 mg         26      7.5 mg         27      10 mg         28      7.5 mg         29      7.5 mg           30      7.5 mg         31      7.5 mg               Date Details   12/05 This INR check               How to take your warfarin dose     To take:  7.5 mg Take 1.5 of the 5 mg tablets.    To take:  10 mg Take 2 of the 5 mg tablets.           January 2019 Details    Sun Mon Tue Wed Thu  Fri Sat       1      7.5 mg         2            3               4               5                 6               7               8               9               10               11               12                 13               14               15               16               17               18               19                 20               21               22               23               24               25               26                 27               28               29               30               31                  Date Details   No additional details    Date of next INR:  1/2/2019         How to take your warfarin dose     To take:  7.5 mg Take 1.5 of the 5 mg tablets.

## 2018-12-29 DIAGNOSIS — I48.20 CHRONIC ATRIAL FIBRILLATION (H): ICD-10-CM

## 2018-12-29 DIAGNOSIS — I10 HYPERTENSION GOAL BP (BLOOD PRESSURE) < 140/90: ICD-10-CM

## 2018-12-29 DIAGNOSIS — Z79.01 LONG TERM CURRENT USE OF ANTICOAGULANT THERAPY: ICD-10-CM

## 2019-01-02 RX ORDER — WARFARIN SODIUM 5 MG/1
TABLET ORAL
Qty: 138 TABLET | Refills: 0 | Status: SHIPPED | OUTPATIENT
Start: 2019-01-02 | End: 2019-04-10

## 2019-01-02 RX ORDER — LISINOPRIL 2.5 MG/1
TABLET ORAL
Qty: 90 TABLET | Refills: 0 | Status: SHIPPED | OUTPATIENT
Start: 2019-01-02 | End: 2019-04-13

## 2019-01-02 RX ORDER — DILTIAZEM HYDROCHLORIDE 240 MG/1
240 CAPSULE, COATED, EXTENDED RELEASE ORAL DAILY
Qty: 90 CAPSULE | Refills: 1 | Status: SHIPPED | OUTPATIENT
Start: 2019-01-02 | End: 2019-05-31

## 2019-01-02 NOTE — TELEPHONE ENCOUNTER
"Requested Prescriptions   Pending Prescriptions Disp Refills     CARTIA  MG 24 hr capsule [Pharmacy Med Name: CARTIA /24HR  CAP] 90 capsule 1     Sig: TAKE 1 CAPSULE BY MOUTH ONCE DAILY    Calcium Channel Blockers Protocol  Failed - 12/29/2018 11:15 AM       Failed - Normal ALT in past 12 months    Recent Labs   Lab Test 12/14/15  0750   ALT 40            Passed - Blood pressure under 140/90 in past 12 months    BP Readings from Last 3 Encounters:   03/26/18 109/71   06/15/17 109/73   02/07/17 111/70                Passed - Recent (12 mo) or future (30 days) visit within the authorizing provider's specialty    Patient had office visit in the last 12 months or has a visit in the next 30 days with authorizing provider or within the authorizing provider's specialty.  See \"Patient Info\" tab in inbasket, or \"Choose Columns\" in Meds & Orders section of the refill encounter.             Passed - Patient is age 18 or older       Passed - Normal serum creatinine on file in past 12 months    Recent Labs   Lab Test 03/26/18  0835   CR 0.80           Signed Prescriptions Disp Refills     lisinopril (PRINIVIL/ZESTRIL) 2.5 MG tablet 90 tablet 0     Sig: TAKE 1 TABLET BY MOUTH ONCE DAILY    ACE Inhibitors (Including Combos) Protocol Passed - 12/29/2018 11:15 AM       Passed - Blood pressure under 140/90 in past 12 months    BP Readings from Last 3 Encounters:   03/26/18 109/71   06/15/17 109/73   02/07/17 111/70                Passed - Recent (12 mo) or future (30 days) visit within the authorizing provider's specialty    Patient had office visit in the last 12 months or has a visit in the next 30 days with authorizing provider or within the authorizing provider's specialty.  See \"Patient Info\" tab in inbasket, or \"Choose Columns\" in Meds & Orders section of the refill encounter.             Passed - Patient is age 18 or older       Passed - Normal serum creatinine on file in past 12 months    Recent Labs   Lab Test " "03/26/18  0835   CR 0.80            Passed - Normal serum potassium on file in past 12 months    Recent Labs   Lab Test 03/26/18  0835   POTASSIUM 4.0             warfarin (COUMADIN) 5 MG tablet 138 tablet 0     Sig: TAKE DAILY AS DIRECTED. CURRENT DOSE IS 2 TABLETS (10 MG) ON THURSDAY'S AND 7.5 MG ON THE REST OF THE WEEK DAYS.    Vitamin K Antagonists Failed - 12/29/2018 11:15 AM       Failed - INR is within goal in the past 6 weeks    Confirm INR is within goal in the past 6 weeks.     Recent Labs   Lab Test 12/05/18   INR 1.8*                      Passed - Recent (12 mo) or future (30 days) visit within the authorizing provider's specialty    Patient had office visit in the last 12 months or has a visit in the next 30 days with authorizing provider or within the authorizing provider's specialty.  See \"Patient Info\" tab in inbasket, or \"Choose Columns\" in Meds & Orders section of the refill encounter.             Passed - Patient is 18 years of age or older        Routing refill for Cartia to Provider due to labs not up to day. Franchesca Jarvis RN    "

## 2019-01-03 ENCOUNTER — ANTICOAGULATION THERAPY VISIT (OUTPATIENT)
Dept: NURSING | Facility: CLINIC | Age: 64
End: 2019-01-03
Payer: COMMERCIAL

## 2019-01-03 DIAGNOSIS — I48.20 CHRONIC ATRIAL FIBRILLATION (H): ICD-10-CM

## 2019-01-03 LAB — INR POINT OF CARE: 1.7 (ref 0.86–1.14)

## 2019-01-03 PROCEDURE — 36416 COLLJ CAPILLARY BLOOD SPEC: CPT

## 2019-01-03 PROCEDURE — 99207 ZZC NO CHARGE NURSE ONLY: CPT

## 2019-01-03 PROCEDURE — 85610 PROTHROMBIN TIME: CPT | Mod: QW

## 2019-01-03 NOTE — PROGRESS NOTES
ANTICOAGULATION FOLLOW-UP CLINIC VISIT    Patient Name:  Cameron Mariano  Date:  1/3/2019  Contact Type:  Face to Face    SUBJECTIVE:     Patient Findings     Positives:   Herbal/Supplements    Comments:   Has been taking Ginseng daily for past 2 months. This is probable cause of sub results as it is the only change. He will stop using Ginseng.           OBJECTIVE    INR Protime   Date Value Ref Range Status   2019 1.7 (A) 0.86 - 1.14 Final       ASSESSMENT / PLAN  INR assessment SUB    Recheck INR In: 4 WEEKS    INR Location Clinic      Anticoagulation Summary  As of 1/3/2019    INR goal:   2.0-3.0   TTR:   67.5 % (2.7 y)   INR used for dosin.7! (1/3/2019)   Warfarin maintenance plan:   10 mg (5 mg x 2) every Thu; 7.5 mg (5 mg x 1.5) all other days   Full warfarin instructions:   10 mg every Thu; 7.5 mg all other days   Weekly warfarin total:   55 mg   Plan last modified:   Franchesca Jarvis RN (2018)   Next INR check:   2019   Target end date:       Indications    Chronic atrial fibrillation (H) [I48.2]  Long-term (current) use of anticoagulants [Z79.01] [Z79.01]             Anticoagulation Episode Summary     INR check location:       Preferred lab:       Send INR reminders to:   [facility]    Comments:         Anticoagulation Care Providers     Provider Role Specialty Phone number    Pablo Benz MD [facility] [phone]            See the Encounter Report to view Anticoagulation Flowsheet and Dosing Calendar (Go to Encounters tab in chart review, and find the Anticoagulation Therapy Visit)        Franchesca Jarvis RN

## 2019-01-23 ENCOUNTER — TELEPHONE (OUTPATIENT)
Dept: FAMILY MEDICINE | Facility: CLINIC | Age: 64
End: 2019-01-23

## 2019-01-23 NOTE — TELEPHONE ENCOUNTER
Mery is calling to ask if pcp will agree with the bridging orders for patient to be off coumadin 4 days prior to colonoscopy and to have INR prior to colonoscopy      Please call to advice  Thank you

## 2019-01-23 NOTE — TELEPHONE ENCOUNTER
To provider to advise. Need ok for warfarin hold for 4 days prior to colonoscopy and if bridging needed.  Thank you. Franchesca Jarvis RN

## 2019-01-23 NOTE — TELEPHONE ENCOUNTER
Called [facility] and spoke with Kim and informed that patient can hold warfarin 4 days prior to colonoscopy and will be bridged with lovenox. Patient will need INR prior to procedure.  Franchesca Jarvis RN  Last time hold on 1/27/16 was bridged with lovenox 40 mg sub q daily.  Will need to give patient instructions and order lovenox when patient is scheduled.  Franchesca Jarvis RN  Next scheduled INR apt 1/31/19. Franchesca Jarvis RN

## 2019-01-25 NOTE — TELEPHONE ENCOUNTER
Left message on voicemail for patient to call back and let me know when procedure is scheduled and to discuss hold and bridging instructions if needed before I see him at next apt on 1/31/19. Franchesca Jarvis RN

## 2019-01-31 ENCOUNTER — ANTICOAGULATION THERAPY VISIT (OUTPATIENT)
Dept: NURSING | Facility: CLINIC | Age: 64
End: 2019-01-31
Payer: COMMERCIAL

## 2019-01-31 DIAGNOSIS — I48.20 CHRONIC ATRIAL FIBRILLATION (H): ICD-10-CM

## 2019-01-31 LAB — INR POINT OF CARE: 2.7 (ref 0.86–1.14)

## 2019-01-31 PROCEDURE — 99207 ZZC NO CHARGE NURSE ONLY: CPT

## 2019-01-31 PROCEDURE — 36416 COLLJ CAPILLARY BLOOD SPEC: CPT

## 2019-01-31 PROCEDURE — 85610 PROTHROMBIN TIME: CPT | Mod: QW

## 2019-01-31 NOTE — PROGRESS NOTES
ANTICOAGULATION FOLLOW-UP CLINIC VISIT    Patient Name:  Cameron Mariano  Date:  2019  Contact Type:  Face to Face    SUBJECTIVE:     Patient Findings     Positives:   No Problem Findings    Comments:   Planning colonoscopy but is not scheduled. Will need to hold warfarin 4 days and bridge with lovenox 40 mg daily. Has done self injections in past and says is comfortable. Reviewed giving self injections. He will call me when scheduled. He will need rx for lovenox 40 mg to his [facility] in [place].            OBJECTIVE    INR Protime   Date Value Ref Range Status   2019 2.7 (A) 0.86 - 1.14 Final       ASSESSMENT / PLAN  INR assessment THER    Recheck INR In: 4 WEEKS    INR Location Clinic      Anticoagulation Summary  As of 2019    INR goal:   2.0-3.0   TTR:   67.5 % (2.8 y)   INR used for dosin.7 (2019)   Warfarin maintenance plan:   10 mg (5 mg x 2) every Thu; 7.5 mg (5 mg x 1.5) all other days   Full warfarin instructions:   10 mg every Thu; 7.5 mg all other days   Weekly warfarin total:   55 mg   No change documented:   Franchesca Jarvis RN   Plan last modified:   Franchesca Jarvis RN (2018)   Next INR check:   2019   Target end date:       Indications    Chronic atrial fibrillation (H) [I48.2]  Long-term (current) use of anticoagulants [Z79.01] [Z79.01]             Anticoagulation Episode Summary     INR check location:       Preferred lab:       Send INR reminders to:   [facility]    Comments:         Anticoagulation Care Providers     Provider Role Specialty Phone number    Pablo Benz MD [facility] [phone]            See the Encounter Report to view Anticoagulation Flowsheet and Dosing Calendar (Go to Encounters tab in chart review, and find the Anticoagulation Therapy Visit)        Franchesca Jarvis RN

## 2019-02-05 ENCOUNTER — TELEPHONE (OUTPATIENT)
Dept: FAMILY MEDICINE | Facility: CLINIC | Age: 64
End: 2019-02-05

## 2019-02-05 DIAGNOSIS — I48.20 CHRONIC ATRIAL FIBRILLATION (H): Primary | ICD-10-CM

## 2019-02-05 NOTE — TELEPHONE ENCOUNTER
Spoke with patient on phone. RX for lovenox sent to pharmacy. He was given instructions written at last INR visit. Reviewed instructions for bridging. .hold warfarin 4 days prior to procedure. Start lovenox 40 mg sub q daily in am on 2/25, None on 2/28 and then resume daily on 2/29. Resume coumadin night of procedure starting with 10 mg daily for 3 days then back to weekly dose. INR scheduled for 3/6/19. Franchesca Jarvis RN

## 2019-02-05 NOTE — TELEPHONE ENCOUNTER
Patient is scheduled for a colonoscopy on Thursday 2/28 at 6:45 am. Would like a call back with inr direction please.

## 2019-02-28 ENCOUNTER — TRANSFERRED RECORDS (OUTPATIENT)
Dept: HEALTH INFORMATION MANAGEMENT | Facility: CLINIC | Age: 64
End: 2019-02-28

## 2019-03-04 ENCOUNTER — TELEPHONE (OUTPATIENT)
Dept: FAMILY MEDICINE | Facility: CLINIC | Age: 64
End: 2019-03-04

## 2019-03-04 NOTE — TELEPHONE ENCOUNTER
Please abstract the following data from this visit with this patient into the appropriate field in Epic:    Colonoscopy done on this date: 2/28/19 (approximately), by this group: [facility], results were Repeat in 5 years.

## 2019-03-06 ENCOUNTER — TELEPHONE (OUTPATIENT)
Dept: FAMILY MEDICINE | Facility: CLINIC | Age: 64
End: 2019-03-06

## 2019-03-06 ENCOUNTER — ANTICOAGULATION THERAPY VISIT (OUTPATIENT)
Dept: NURSING | Facility: CLINIC | Age: 64
End: 2019-03-06
Payer: COMMERCIAL

## 2019-03-06 DIAGNOSIS — I48.20 CHRONIC ATRIAL FIBRILLATION (H): ICD-10-CM

## 2019-03-06 DIAGNOSIS — I48.20 CHRONIC ATRIAL FIBRILLATION (H): Primary | ICD-10-CM

## 2019-03-06 LAB — INR POINT OF CARE: 1.8 (ref 0.86–1.14)

## 2019-03-06 PROCEDURE — 85610 PROTHROMBIN TIME: CPT | Mod: QW

## 2019-03-06 PROCEDURE — 99207 ZZC NO CHARGE NURSE ONLY: CPT

## 2019-03-06 PROCEDURE — 36416 COLLJ CAPILLARY BLOOD SPEC: CPT

## 2019-03-06 NOTE — PROGRESS NOTES
ANTICOAGULATION FOLLOW-UP CLINIC VISIT    Patient Name:  Cameron Mariano  Date:  3/6/2019  Contact Type:  Face to Face    SUBJECTIVE:     Patient Findings     Positives:   Intentional hold of therapy    Comments:   Patient had colonoscopy on 6 days ago,. States he held his warfarin and bridged with lovenox daily as directed. He reports his INR was 2.4 on day of procedure done at [facility]. States procedure done with no bleeding concerns. He resumed his warfarin as directed for 2 days and then states he took only 5 mg on sat and sun as he thought it might be too much with INR of 2.4 on day of procedure. I don't have any confirmation of that result. He did not take his lovenox this am. Last dose he took was yesterday am. He is on his way to work but can take the lovenox when he gets home. Recheck INR in 2 days.            OBJECTIVE    INR Protime   Date Value Ref Range Status   2019 1.8 (A) 0.86 - 1.14 Final       ASSESSMENT / PLAN  INR assessment SUB    Recheck INR In: 2 DAYS    INR Location Clinic      Anticoagulation Summary  As of 3/6/2019    INR goal:   2.0-3.0   TTR:   67.9 % (2.9 y)   INR used for dosin.8! (3/6/2019)   Warfarin maintenance plan:   10 mg (5 mg x 2) every Thu; 7.5 mg (5 mg x 1.5) all other days   Full warfarin instructions:   3/6: 10 mg; Otherwise 10 mg every Thu; 7.5 mg all other days   Weekly warfarin total:   55 mg   Plan last modified:   Franchesca Jarvis RN (2018)   Next INR check:   3/8/2019   Target end date:       Indications    Chronic atrial fibrillation (H) [I48.2]  Long-term (current) use of anticoagulants [Z79.01] [Z79.01]             Anticoagulation Episode Summary     INR check location:       Preferred lab:       Send INR reminders to:   [facility]    Comments:         Anticoagulation Care Providers     Provider Role Specialty Phone number    Pablo Benz MD [facility] [phone]            See the Encounter Report to view  Anticoagulation Flowsheet and Dosing Calendar (Go to Encounters tab in chart review, and find the Anticoagulation Therapy Visit)        Franchesca Jarvis RN

## 2019-03-06 NOTE — TELEPHONE ENCOUNTER
Has the patient previously taken warfarin? yes  If yes, for what indication? A-fib    Does the patient have any of the following indications for a higher range of 2.5-3.5:    Mitral position mechanical valve? no    Larissa-Shiley, Ball and Cage or Monoleaflet valve (regardless of position) no    Other (if yes, please explain) no    Annual INR referral needed. Order pended.  Thank you. Franchesca Jarvis RN

## 2019-03-08 ENCOUNTER — ANTICOAGULATION THERAPY VISIT (OUTPATIENT)
Dept: NURSING | Facility: CLINIC | Age: 64
End: 2019-03-08
Payer: COMMERCIAL

## 2019-03-08 DIAGNOSIS — I48.20 CHRONIC ATRIAL FIBRILLATION (H): ICD-10-CM

## 2019-03-08 LAB — INR POINT OF CARE: 3.1 (ref 0.86–1.14)

## 2019-03-08 PROCEDURE — 85610 PROTHROMBIN TIME: CPT | Mod: QW

## 2019-03-08 PROCEDURE — 36416 COLLJ CAPILLARY BLOOD SPEC: CPT

## 2019-03-08 PROCEDURE — 99207 ZZC NO CHARGE NURSE ONLY: CPT

## 2019-03-08 NOTE — PROGRESS NOTES
ANTICOAGULATION FOLLOW-UP CLINIC VISIT    Patient Name:  Cameron Mariano  Date:  3/8/2019  Contact Type:  Face to Face    SUBJECTIVE:     Patient Findings     Positives:   No Problem Findings    Comments:   Therapeutic. Will resume previous weekly dose.            OBJECTIVE    INR Protime   Date Value Ref Range Status   03/08/2019 3.1 (A) 0.86 - 1.14 Final       ASSESSMENT / PLAN  INR assessment THER    Recheck INR In: 4 WEEKS    INR Location Clinic      Anticoagulation Summary  As of 3/8/2019    INR goal:   2.0-3.0   TTR:   67.9 % (2.9 y)   INR used for dosing:   3.1! (3/8/2019)   Warfarin maintenance plan:   10 mg (5 mg x 2) every Thu; 7.5 mg (5 mg x 1.5) all other days   Full warfarin instructions:   10 mg every Thu; 7.5 mg all other days   Weekly warfarin total:   55 mg   No change documented:   Franchesca Jarvis RN   Plan last modified:   Franchesca Jarvis RN (5/31/2018)   Next INR check:   4/10/2019   Target end date:       Indications    Chronic atrial fibrillation (H) [I48.2]  Long-term (current) use of anticoagulants [Z79.01] [Z79.01]             Anticoagulation Episode Summary     INR check location:       Preferred lab:       Send INR reminders to:   [facility]    Comments:         Anticoagulation Care Providers     Provider Role Specialty Phone number    Pablo Benz MD [facility] [phone]            See the Encounter Report to view Anticoagulation Flowsheet and Dosing Calendar (Go to Encounters tab in chart review, and find the Anticoagulation Therapy Visit)        Franchesca Jarvis RN

## 2019-03-28 ENCOUNTER — OFFICE VISIT (OUTPATIENT)
Dept: FAMILY MEDICINE | Facility: CLINIC | Age: 64
End: 2019-03-28
Payer: COMMERCIAL

## 2019-03-28 ENCOUNTER — ANCILLARY PROCEDURE (OUTPATIENT)
Dept: GENERAL RADIOLOGY | Facility: CLINIC | Age: 64
End: 2019-03-28
Attending: FAMILY MEDICINE
Payer: COMMERCIAL

## 2019-03-28 VITALS
DIASTOLIC BLOOD PRESSURE: 84 MMHG | HEIGHT: 70 IN | BODY MASS INDEX: 26.92 KG/M2 | WEIGHT: 188 LBS | OXYGEN SATURATION: 98 % | SYSTOLIC BLOOD PRESSURE: 125 MMHG | HEART RATE: 59 BPM | RESPIRATION RATE: 16 BRPM | TEMPERATURE: 98.3 F

## 2019-03-28 DIAGNOSIS — M25.512 LEFT SHOULDER PAIN, UNSPECIFIED CHRONICITY: ICD-10-CM

## 2019-03-28 DIAGNOSIS — M25.512 LEFT SHOULDER PAIN, UNSPECIFIED CHRONICITY: Primary | ICD-10-CM

## 2019-03-28 PROCEDURE — 73030 X-RAY EXAM OF SHOULDER: CPT | Mod: LT

## 2019-03-28 PROCEDURE — 99214 OFFICE O/P EST MOD 30 MIN: CPT | Performed by: FAMILY MEDICINE

## 2019-03-28 ASSESSMENT — PAIN SCALES - GENERAL: PAINLEVEL: SEVERE PAIN (6)

## 2019-03-28 ASSESSMENT — MIFFLIN-ST. JEOR: SCORE: 1659.01

## 2019-03-28 NOTE — PROGRESS NOTES
"SUBJECTIVE:  Cameron Mariano, a 63 year old male scheduled an appointment to discuss the following issues:  left shoulder pain. right handed. No Major injury but active/lots of lifting.   Some numbness with sleeping. Strength a little weak. Some neck pain. No history herniated disc. No rashes.   No hand tingling. No shortness of breath. Worse with elevation left shoulder.   No family history arthritis. right shoulder doing ok. No chiro/p.t. In past.   Low back ok. No chest pain or shortness of breath.   Medical, social, surgical, and family histories reviewed.    ROS:  All other ROS negative    OBJECTIVE:  /84   Pulse 59   Temp 98.3  F (36.8  C) (Oral)   Resp 16   Ht 1.778 m (5' 10\")   Wt 85.3 kg (188 lb)   SpO2 98%   BMI 26.98 kg/m    EXAM:  GENERAL APPEARANCE: healthy, alert and no distress  NECK: no adenopathy, no asymmetry, masses, or scars and thyroid normal to palpation  RESP: lungs clear to auscultation - no rales, rhonchi or wheezes  CV: regular rates and rhythm, normal S1 S2, no S3 or S4 and no murmur, click or rub -  ABDOMEN:  soft, nontender, no HSM or masses and bowel sounds normal  MS: extremities normal- no gross deformities noted, no evidence of inflammation in joints, FROM in all extremities.  MS: tenderness superior/lateral shoulder. No tenderness ACjoint. Pain with elevation left shoulder  SKIN: no suspicious lesions or rashes  NEURO: Normal strength and tone, sensory exam grossly normal, mentation intact and speech normal  PSYCH: mentation appears normal and affect normal/bright    ASSESSMENT / PLAN:  (M25.512) Left shoulder pain, unspecified chronicity  (primary encounter diagnosis)  Comment: likely rotator cuff/strain  Plan: XR Shoulder Left 2 Views, ORTHOPEDICS ADULT         REFERRAL        Heat/ice and p.t. Exercises. Follow-up ortho if worse/not improving overall in next 1-2 weeks/sooner if worse. Await rad report.     Pablo Benz MD        "

## 2019-03-28 NOTE — NURSING NOTE
"Chief Complaint   Patient presents with     Shoulder left       Initial /84   Pulse 59   Temp 98.3  F (36.8  C) (Oral)   Resp 16   Ht 1.778 m (5' 10\")   Wt 85.3 kg (188 lb)   SpO2 98%   BMI 26.98 kg/m   Estimated body mass index is 26.98 kg/m  as calculated from the following:    Height as of this encounter: 1.778 m (5' 10\").    Weight as of this encounter: 85.3 kg (188 lb).    Breanna Cooley, KARINA    "

## 2019-04-09 NOTE — PROGRESS NOTES
SUBJECTIVE:  Cameron Mariano is a 63 year old male who is seen in consultation at the request of Pablo Benz MD for left shoulder pain that started  that started/occurred  1 month ago.   Cause: Attributed to specific activity.  Activity: does a lot of heavy overhead lifting at work.  No Major injury but active/lots of lifting.   Some numbness with sleeping. Strength a little weak. Some neck pain    Present symptoms: pain with overhead activities,  pain reaching behind back,  pain lifting,  no weakness with lifting,  popping/snapping,  Pain location: lateral shoulder  Associated symptoms: radiating pain to forearm.  No neck problems  Reports chronic cracking in SC joint area on left     Treatment up to this point: nothing  Prior history of related problems: no prior problems with this area in the past    Significant Orthopedic past medical history: left 4th finger fracture.    Past Medical History:   Diagnosis Date     Atrial fibrillation (H)-- on Coumadin      History of alcohol abuse      Hypertension      MEDICAL HISTORY OF -     cardioversion X 2     Other primary cardiomyopathies     Cardiomyopathy     Peyronie's disease        Past Surgical History:   Procedure Laterality Date     APPENDECTOMY         REVIEW OF SYSTEMS:  CONSTITUTIONAL:  NEGATIVE for fever, chills, change in weight  INTEGUMENTARY/SKIN:  NEGATIVE for worrisome rashes, moles or lesions  EYES:  NEGATIVE for vision changes or irritation  ENT/MOUTH:  NEGATIVE for ear, mouth and throat problems  RESP:  NEGATIVE for significant cough or SOB  BREAST:  NEGATIVE for masses, tenderness or discharge  CV:  NEGATIVE for chest pain, palpitations or peripheral edema  GI:  NEGATIVE for nausea, abdominal pain, heartburn, or change in bowel habits  :  Negative   MUSCULOSKELETAL:  See HPI above  NEURO:  NEGATIVE for weakness, dizziness or paresthesias  ENDOCRINE:  NEGATIVE for temperature intolerance, skin/hair changes  HEME/ALLERGY/IMMUNE:  NEGATIVE for bleeding  "problems  PSYCHIATRIC:  NEGATIVE for changes in mood or affect    OBJECTIVE:  /71 (BP Location: Right arm, Patient Position: Sitting, Cuff Size: Adult Regular)   Pulse 66   Ht 1.778 m (5' 10\")   Wt 85.3 kg (188 lb)   SpO2 97%   BMI 26.98 kg/m       GENERAL: healthy, alert and no distress  GAIT: normal   SKIN: no suspicious lesions or rashes  NEURO: Normal strength and tone, mentation intact and speech normal  VASCULAR:  normal pulses and cappillary refill   PSYCH:  mentation appears normal and affect normal/bright    MUSCULOSKELETAL:    NECK:  Cervical range of motion: full,  painfree, and does not cause shoulder pain or reproduce shoulder pain.  Sensory deficits:  none  Motor deficits:  none  DTR's:  normal    SHOULDER:  Shoulder Inspection: no swelling, bruising, discoloration, or obvious deformity or asymmetry  no atrophy    Non-tender  Range of Motion:   Active:forward flexion 125 degrees, internal rotation  Waist band   Passive: forward flexion 125* degrees, abduction  90 degrees, external rotation  20/20 degrees  Impingement: Waters: 2, Neer: 0, AER: 0 and XB: 0  Strength: forward flexion 5/5, External rotation 5/5   Bear Hug: Negative    Special tests: Sulcus: 0  Speed: Negative  Anterior Drawer: 0  Posterior Drawer: 0  Spurling's: Negative    X-RAY INTERPRETATION: 3/28/19 left shoulder:  Essentially normal      ASSESSMENT    ICD-10-CM    1. Adhesive capsulitis of left shoulder M75.02        PLAN:   NSAID, ice suggested  steroid injection: in the future if pain worsens.  referral to physical therapy  Return to clinic 4-6 weeks       .SANTIAGO Paul MD  Dept. Orthopedic Surgery  [facility]  "

## 2019-04-10 ENCOUNTER — OFFICE VISIT (OUTPATIENT)
Dept: ORTHOPEDICS | Facility: CLINIC | Age: 64
End: 2019-04-10
Payer: COMMERCIAL

## 2019-04-10 ENCOUNTER — ANTICOAGULATION THERAPY VISIT (OUTPATIENT)
Dept: NURSING | Facility: CLINIC | Age: 64
End: 2019-04-10
Payer: COMMERCIAL

## 2019-04-10 VITALS
HEART RATE: 66 BPM | DIASTOLIC BLOOD PRESSURE: 71 MMHG | SYSTOLIC BLOOD PRESSURE: 112 MMHG | HEIGHT: 70 IN | WEIGHT: 188 LBS | OXYGEN SATURATION: 97 % | BODY MASS INDEX: 26.92 KG/M2

## 2019-04-10 DIAGNOSIS — I48.20 CHRONIC ATRIAL FIBRILLATION (H): ICD-10-CM

## 2019-04-10 DIAGNOSIS — Z79.01 LONG TERM CURRENT USE OF ANTICOAGULANT THERAPY: ICD-10-CM

## 2019-04-10 DIAGNOSIS — M75.02 ADHESIVE CAPSULITIS OF LEFT SHOULDER: Primary | ICD-10-CM

## 2019-04-10 LAB — INR POINT OF CARE: 2.2 (ref 0.86–1.14)

## 2019-04-10 PROCEDURE — 99244 OFF/OP CNSLTJ NEW/EST MOD 40: CPT | Performed by: ORTHOPAEDIC SURGERY

## 2019-04-10 PROCEDURE — 99207 ZZC NO CHARGE NURSE ONLY: CPT

## 2019-04-10 PROCEDURE — 36416 COLLJ CAPILLARY BLOOD SPEC: CPT

## 2019-04-10 PROCEDURE — 85610 PROTHROMBIN TIME: CPT | Mod: QW

## 2019-04-10 RX ORDER — WARFARIN SODIUM 5 MG/1
TABLET ORAL
Qty: 138 TABLET | Refills: 0 | Status: SHIPPED | OUTPATIENT
Start: 2019-04-10 | End: 2019-08-26

## 2019-04-10 ASSESSMENT — MIFFLIN-ST. JEOR: SCORE: 1659.01

## 2019-04-10 NOTE — PROGRESS NOTES
ANTICOAGULATION FOLLOW-UP CLINIC VISIT    Patient Name:  Cameron Mariano  Date:  4/10/2019  Contact Type:  Face to Face    SUBJECTIVE:     Patient Findings     Comments:   The patient was assessed for diet, medication, and activity level changes, missed or extra doses, bruising or bleeding, with no problem findings.             OBJECTIVE    INR Protime   Date Value Ref Range Status   04/10/2019 2.2 (A) 0.86 - 1.14 Final       ASSESSMENT / PLAN  INR assessment THER    Recheck INR In: 4 WEEKS    INR Location Clinic      Anticoagulation Summary  As of 4/10/2019    INR goal:   2.0-3.0   TTR:   68.5 % (3 y)   INR used for dosin.2 (4/10/2019)   Warfarin maintenance plan:   10 mg (5 mg x 2) every Thu; 7.5 mg (5 mg x 1.5) all other days   Full warfarin instructions:   10 mg every Thu; 7.5 mg all other days   Weekly warfarin total:   55 mg   No change documented:   Franchesca Jarvis RN   Plan last modified:   Franchesca Jarvis RN (2018)   Next INR check:   2019   Target end date:       Indications    Chronic atrial fibrillation (H) [I48.2]  Long-term (current) use of anticoagulants [Z79.01] [Z79.01]             Anticoagulation Episode Summary     INR check location:       Preferred lab:       Send INR reminders to:   AN [facility]    Comments:         Anticoagulation Care Providers     Provider Role Specialty Phone number    Pablo Benz MD [facility] [phone]            See the Encounter Report to view Anticoagulation Flowsheet and Dosing Calendar (Go to Encounters tab in chart review, and find the Anticoagulation Therapy Visit)        Franchesca Jarvis RN

## 2019-04-10 NOTE — LETTER
4/10/2019         RE: Cameron Mariano  [address]  [place] [facility] [address]        Dear Colleague,    Thank you for referring your patient, Cameron Mariano, to the [facility]. Please see a copy of my visit note below.    SUBJECTIVE:  Cameron Mariano is a 63 year old male who is seen in consultation at the request of Pablo Benz MD for left shoulder pain that started  that started/occurred  1 month ago.   Cause: Attributed to specific activity.  Activity: does a lot of heavy overhead lifting at work.  No Major injury but active/lots of lifting.   Some numbness with sleeping. Strength a little weak. Some neck pain    Present symptoms: pain with overhead activities,  pain reaching behind back,  pain lifting,  no weakness with lifting,  popping/snapping,  Pain location: lateral shoulder  Associated symptoms: radiating pain to forearm.  No neck problems  Reports chronic cracking in SC joint area on left     Treatment up to this point: nothing  Prior history of related problems: no prior problems with this area in the past    Significant Orthopedic past medical history: left 4th finger fracture.    Past Medical History:   Diagnosis Date     Atrial fibrillation (H)-- on Coumadin      History of alcohol abuse      Hypertension      MEDICAL HISTORY OF -     cardioversion X 2     Other primary cardiomyopathies     Cardiomyopathy     Peyronie's disease        Past Surgical History:   Procedure Laterality Date     APPENDECTOMY         REVIEW OF SYSTEMS:  CONSTITUTIONAL:  NEGATIVE for fever, chills, change in weight  INTEGUMENTARY/SKIN:  NEGATIVE for worrisome rashes, moles or lesions  EYES:  NEGATIVE for vision changes or irritation  ENT/MOUTH:  NEGATIVE for ear, mouth and throat problems  RESP:  NEGATIVE for significant cough or SOB  BREAST:  NEGATIVE for masses, tenderness or discharge  CV:  NEGATIVE for chest pain, palpitations or peripheral edema  GI:  NEGATIVE for nausea, abdominal pain, heartburn, or change  "in bowel habits  :  Negative   MUSCULOSKELETAL:  See HPI above  NEURO:  NEGATIVE for weakness, dizziness or paresthesias  ENDOCRINE:  NEGATIVE for temperature intolerance, skin/hair changes  HEME/ALLERGY/IMMUNE:  NEGATIVE for bleeding problems  PSYCHIATRIC:  NEGATIVE for changes in mood or affect    OBJECTIVE:  /71 (BP Location: Right arm, Patient Position: Sitting, Cuff Size: Adult Regular)   Pulse 66   Ht 1.778 m (5' 10\")   Wt 85.3 kg (188 lb)   SpO2 97%   BMI 26.98 kg/m        GENERAL: healthy, alert and no distress  GAIT: normal   SKIN: no suspicious lesions or rashes  NEURO: Normal strength and tone, mentation intact and speech normal  VASCULAR:  normal pulses and cappillary refill   PSYCH:  mentation appears normal and affect normal/bright    MUSCULOSKELETAL:    NECK:  Cervical range of motion: full,  painfree, and does not cause shoulder pain or reproduce shoulder pain.  Sensory deficits:  none  Motor deficits:  none  DTR's:  normal    SHOULDER:  Shoulder Inspection: no swelling, bruising, discoloration, or obvious deformity or asymmetry  no atrophy    Non-tender  Range of Motion:   Active:forward flexion 125 degrees, internal rotation  Waist band   Passive: forward flexion 125* degrees, abduction  90 degrees, external rotation  20/20 degrees  Impingement: Waters: 2, Neer: 0, AER: 0 and XB: 0  Strength: forward flexion 5/5, External rotation 5/5   Bear Hug: Negative    Special tests: Sulcus: 0  Speed: Negative  Anterior Drawer: 0  Posterior Drawer: 0  Spurling's: Negative    X-RAY INTERPRETATION: 3/28/19 left shoulder:  Essentially normal      ASSESSMENT    ICD-10-CM    1. Adhesive capsulitis of left shoulder M75.02        PLAN:   NSAID, ice suggested  steroid injection: in the future if pain worsens.  referral to physical therapy  Return to clinic 4-6 weeks       .SANTIAGO Paul MD  Dept. Orthopedic Surgery  [facility]    Again, thank you for allowing me to participate in the " care of your patient.        Sincerely,        Edison Paul MD

## 2019-04-13 ENCOUNTER — THERAPY VISIT (OUTPATIENT)
Dept: PHYSICAL THERAPY | Facility: CLINIC | Age: 64
End: 2019-04-13
Attending: ORTHOPAEDIC SURGERY
Payer: COMMERCIAL

## 2019-04-13 DIAGNOSIS — M75.02 ADHESIVE CAPSULITIS OF LEFT SHOULDER: ICD-10-CM

## 2019-04-13 DIAGNOSIS — I10 HYPERTENSION GOAL BP (BLOOD PRESSURE) < 140/90: ICD-10-CM

## 2019-04-13 PROCEDURE — 97110 THERAPEUTIC EXERCISES: CPT | Mod: GP | Performed by: PHYSICAL THERAPIST

## 2019-04-13 PROCEDURE — 97161 PT EVAL LOW COMPLEX 20 MIN: CPT | Mod: GP | Performed by: PHYSICAL THERAPIST

## 2019-04-13 NOTE — PROGRESS NOTES
[facility] Initial Evaluation  Subjective:  The history is provided by the patient. No  was used.   Cameron Mariano is a 63 year old male with a left shoulder condition.  Condition occurred with:  Unknown cause.  Condition occurred: for unknown reasons.  This is a new condition  About 2 months ago per pt (2-)  .    Patient reports pain:  In the joint and lateral.    Pain is described as aching and is constant and reported as 5/10.   Pain is the same all the time.  Symptoms are exacerbated by carrying, lying on extremity, certain positions, using arm behind back, lifting and using arm overhead and relieved by rest.  Since onset symptoms are gradually worsening.        General health as reported by patient is good.  Pertinent medical history includes:  High blood pressure and heart problems.  Medical allergies: yes (asperteen).  Other surgeries include:  None reported.  Current medications:  High blood pressure medication (blood thinners).  Current occupation is Warehouse  .    Primary job tasks include:  Lifting, repetitive tasks and prolonged standing.    Barriers include:  None as reported by the patient.    Red flags:  None as reported by the patient.                        Objective:  Standing Alignment:      Shoulder/UE:  Rounded shoulders                  Flexibility/Screens:   Negative screens: Cervical   Upper Extremity:    Decreased left upper extremity flexibility at:  Pectoralis Minor    Decreased right upper extremity flexibility present at:  Pectoralis Minor    Spine:  Decreased left spine flexibility:  Upper Trap                         Shoulder Evaluation:  ROM:  AROM:  : R shldr is WFL.  Flexion:  Left:  100        Abduction:  Left: 80         External Rotation:  Left:  15              Flexion/External Rotation:  Left:  C2      Extension/Internal Rotation:  Left:  Lateral hip        PROM:    Flexion:  Left:  110          Abduction:  Left:  90           External Rotation:  Left:  20                        Strength:    Flexion: Left:5-/5   Pain:    Right: 5/5     Pain:   Extension:  Left: 5/5    Pain:    Right: 5/5    Pain:  Abduction:  Left: 4+/5   Pain:+    Right: 5/5     Pain:  Adduction:  Left: 5-/5     Pain:+    Right: 5/5     Pain:  Internal Rotation:  Left:5-/5     Pain:    Right: 5/5     Pain:  External Rotation:   Left:4+/5     Pain:   Right:5/5     Pain:        Elbow Flexion:  Left:5/5     Pain:    Right:5/5     Pain:  Elbow Extension:  Left:5/5     Pain:    Right:5/5     Pain:  Stability Testing:  normal      Special Tests:    Left shoulder positive for the following special tests:  Impingement    Palpation:    Left shoulder tenderness present at:  Levator and Upper Trap    Mobility Tests:      Glenohumeral posterior left:  Hypomobile                                               General     ROS    Assessment/Plan:    Patient is a 63 year old male with left side shoulder complaints.    Patient has the following significant findings with corresponding treatment plan.                Diagnosis 1:  L shoulder pain  Pain -  manual therapy, self management, education and home program  Decreased ROM/flexibility - manual therapy, therapeutic exercise, therapeutic activity and home program  Decreased strength - therapeutic exercise, therapeutic activities and home program  Inflammation - self management/home program  Impaired muscle performance - neuro re-education and home program  Decreased function - therapeutic activities and home program    Therapy Evaluation Codes:   History comprised of:  Cumulative Therapy Evaluation is: Low complexity.    Previous and current functional limitations:  (See Goal Flow Sheet for this information)    Short term and Long term goals: (See Goal Flow Sheet for this information)     Communication ability:  Patient appears to be able to clearly communicate and understand verbal and written communication and follow directions  correctly.  Treatment Explanation - The following has been discussed with the patient:   RX ordered/plan of care  Anticipated outcomes  Possible risks and side effects  This patient would benefit from PT intervention to resume normal activities.   Rehab potential is excellent.    Frequency:  1 X week, once daily  Duration:  for 10 weeks  Discharge Plan:  Achieve all LTG.  Independent in home treatment program.  Reach maximal therapeutic benefit.    Please refer to the daily flowsheet for treatment today, total treatment time and time spent performing 1:1 timed codes.

## 2019-04-13 NOTE — LETTER
April 15, 2019    Cameron Mariano  [address] [place] [place] [address]    Dear Cameron,       We recently received a refill request for Lisinopril (PRINIVIL/ZESTRIL) 2.5 MG tablet.  We have refilled this for a one time 30 day supply only because you are due for a:    Hypertension / Cholesterol office visit and Fasting lab appointment      Please schedule this lab appointment 4-5 days prior to the office visit.     Please call at your earliest convenience so that there will not be a delay with your future refills.          Thank you,   Your [facility] Team/mkr  [phone]

## 2019-04-15 RX ORDER — LISINOPRIL 2.5 MG/1
TABLET ORAL
Qty: 30 TABLET | Refills: 0 | Status: SHIPPED | OUTPATIENT
Start: 2019-04-15 | End: 2019-05-31

## 2019-04-15 NOTE — TELEPHONE ENCOUNTER
Medication is being filled for 1 time refill only due to:  Patient needs to be seen for fasting lab appointment and appointment with the provider for further refills. Cholesterol and hypertension.  Breanna OHN, RN

## 2019-04-20 ENCOUNTER — THERAPY VISIT (OUTPATIENT)
Dept: PHYSICAL THERAPY | Facility: CLINIC | Age: 64
End: 2019-04-20
Payer: COMMERCIAL

## 2019-04-20 DIAGNOSIS — M75.02 ADHESIVE CAPSULITIS OF LEFT SHOULDER: ICD-10-CM

## 2019-04-20 PROCEDURE — 97140 MANUAL THERAPY 1/> REGIONS: CPT | Mod: GP | Performed by: PHYSICAL THERAPIST

## 2019-04-20 PROCEDURE — 97112 NEUROMUSCULAR REEDUCATION: CPT | Mod: GP | Performed by: PHYSICAL THERAPIST

## 2019-04-20 PROCEDURE — 97110 THERAPEUTIC EXERCISES: CPT | Mod: GP | Performed by: PHYSICAL THERAPIST

## 2019-05-04 ENCOUNTER — THERAPY VISIT (OUTPATIENT)
Dept: PHYSICAL THERAPY | Facility: CLINIC | Age: 64
End: 2019-05-04
Payer: COMMERCIAL

## 2019-05-04 DIAGNOSIS — M75.02 ADHESIVE CAPSULITIS OF LEFT SHOULDER: ICD-10-CM

## 2019-05-04 PROCEDURE — 97112 NEUROMUSCULAR REEDUCATION: CPT | Mod: GP | Performed by: PHYSICAL THERAPIST

## 2019-05-04 PROCEDURE — 97110 THERAPEUTIC EXERCISES: CPT | Mod: GP | Performed by: PHYSICAL THERAPIST

## 2019-05-04 PROCEDURE — 97140 MANUAL THERAPY 1/> REGIONS: CPT | Mod: GP | Performed by: PHYSICAL THERAPIST

## 2019-05-07 ENCOUNTER — DOCUMENTATION ONLY (OUTPATIENT)
Dept: LAB | Facility: CLINIC | Age: 64
End: 2019-05-07

## 2019-05-07 DIAGNOSIS — I10 HYPERTENSION GOAL BP (BLOOD PRESSURE) < 140/90: ICD-10-CM

## 2019-05-07 DIAGNOSIS — I48.20 CHRONIC ATRIAL FIBRILLATION (H): ICD-10-CM

## 2019-05-07 DIAGNOSIS — Z12.5 SCREENING PSA (PROSTATE SPECIFIC ANTIGEN): ICD-10-CM

## 2019-05-07 DIAGNOSIS — E78.5 HYPERLIPIDEMIA LDL GOAL <130: Primary | ICD-10-CM

## 2019-05-07 NOTE — PROGRESS NOTES
PLEASE REVIEW, PLACE FUTURE ORDERS OR SIGN PENDED ORDERS FOR PATIENT'S UPCOMING LAB ONLY APPOINTMENT ON 05.08.19 early morning.  Patient is also due for HIV, but not pended.    THANK YOU.   MAXIME INTERIANO

## 2019-05-07 NOTE — PROGRESS NOTES
Please review lab orders sign and close encounter. Shawna Navarro MA/TC    Cholesterol/med check appt 5/31/19

## 2019-05-08 ENCOUNTER — ANTICOAGULATION THERAPY VISIT (OUTPATIENT)
Dept: NURSING | Facility: CLINIC | Age: 64
End: 2019-05-08
Payer: COMMERCIAL

## 2019-05-08 DIAGNOSIS — I48.20 CHRONIC ATRIAL FIBRILLATION (H): ICD-10-CM

## 2019-05-08 DIAGNOSIS — Z12.5 SCREENING PSA (PROSTATE SPECIFIC ANTIGEN): ICD-10-CM

## 2019-05-08 DIAGNOSIS — I10 HYPERTENSION GOAL BP (BLOOD PRESSURE) < 140/90: ICD-10-CM

## 2019-05-08 DIAGNOSIS — E78.5 HYPERLIPIDEMIA LDL GOAL <130: ICD-10-CM

## 2019-05-08 LAB
ALBUMIN SERPL-MCNC: 3.9 G/DL (ref 3.4–5)
ANION GAP SERPL CALCULATED.3IONS-SCNC: 7 MMOL/L (ref 3–14)
BUN SERPL-MCNC: 12 MG/DL (ref 7–30)
CALCIUM SERPL-MCNC: 9.1 MG/DL (ref 8.5–10.1)
CHLORIDE SERPL-SCNC: 104 MMOL/L (ref 94–109)
CHOLEST SERPL-MCNC: 222 MG/DL
CO2 SERPL-SCNC: 28 MMOL/L (ref 20–32)
CREAT SERPL-MCNC: 0.78 MG/DL (ref 0.66–1.25)
ERYTHROCYTE [DISTWIDTH] IN BLOOD BY AUTOMATED COUNT: 14.5 % (ref 10–15)
GFR SERPL CREATININE-BSD FRML MDRD: >90 ML/MIN/{1.73_M2}
GLUCOSE SERPL-MCNC: 107 MG/DL (ref 70–99)
HCT VFR BLD AUTO: 46.1 % (ref 40–53)
HDLC SERPL-MCNC: 69 MG/DL
HGB BLD-MCNC: 15.4 G/DL (ref 13.3–17.7)
INR POINT OF CARE: 3.1 (ref 0.86–1.14)
LDLC SERPL CALC-MCNC: 135 MG/DL
MCH RBC QN AUTO: 31.8 PG (ref 26.5–33)
MCHC RBC AUTO-ENTMCNC: 33.4 G/DL (ref 31.5–36.5)
MCV RBC AUTO: 95 FL (ref 78–100)
NONHDLC SERPL-MCNC: 153 MG/DL
PHOSPHATE SERPL-MCNC: 2.5 MG/DL (ref 2.5–4.5)
PLATELET # BLD AUTO: 192 10E9/L (ref 150–450)
POTASSIUM SERPL-SCNC: 4.1 MMOL/L (ref 3.4–5.3)
PSA SERPL-ACNC: 0.96 UG/L (ref 0–4)
RBC # BLD AUTO: 4.85 10E12/L (ref 4.4–5.9)
SODIUM SERPL-SCNC: 139 MMOL/L (ref 133–144)
TRIGL SERPL-MCNC: 92 MG/DL
WBC # BLD AUTO: 6.6 10E9/L (ref 4–11)

## 2019-05-08 PROCEDURE — 80069 RENAL FUNCTION PANEL: CPT | Performed by: FAMILY MEDICINE

## 2019-05-08 PROCEDURE — 99207 ZZC NO CHARGE NURSE ONLY: CPT

## 2019-05-08 PROCEDURE — 85027 COMPLETE CBC AUTOMATED: CPT | Performed by: FAMILY MEDICINE

## 2019-05-08 PROCEDURE — G0103 PSA SCREENING: HCPCS | Performed by: FAMILY MEDICINE

## 2019-05-08 PROCEDURE — 80061 LIPID PANEL: CPT | Performed by: FAMILY MEDICINE

## 2019-05-08 PROCEDURE — 85610 PROTHROMBIN TIME: CPT | Mod: QW

## 2019-05-08 PROCEDURE — 36416 COLLJ CAPILLARY BLOOD SPEC: CPT

## 2019-05-08 NOTE — PROGRESS NOTES
ANTICOAGULATION FOLLOW-UP CLINIC VISIT    Patient Name:  Cameron Mariano  Date:  5/8/2019  Contact Type:  Face to Face    SUBJECTIVE:     Patient Findings     Comments:   The patient was assessed for diet, medication, and activity level changes, missed or extra doses, bruising or bleeding, with no problem findings.             OBJECTIVE    INR Protime   Date Value Ref Range Status   05/08/2019 3.1 (A) 0.86 - 1.14 Final       ASSESSMENT / PLAN  INR assessment THER    Recheck INR In: 4 WEEKS    INR Location Clinic      Anticoagulation Summary  As of 5/8/2019    INR goal:   2.0-3.0   TTR:   69.0 % (3 y)   INR used for dosing:   3.1! (5/8/2019)   Warfarin maintenance plan:   10 mg (5 mg x 2) every Thu; 7.5 mg (5 mg x 1.5) all other days   Full warfarin instructions:   10 mg every Thu; 7.5 mg all other days   Weekly warfarin total:   55 mg   No change documented:   Franchesca Jarvis RN   Plan last modified:   Franchesca Jarvis RN (5/31/2018)   Next INR check:   6/5/2019   Target end date:       Indications    Chronic atrial fibrillation (H) [I48.2]  Long-term (current) use of anticoagulants [Z79.01] [Z79.01]             Anticoagulation Episode Summary     INR check location:       Preferred lab:       Send INR reminders to:   AN [facility]    Comments:         Anticoagulation Care Providers     Provider Role Specialty Phone number    Pablo Benz MD [facility] [phone]            See the Encounter Report to view Anticoagulation Flowsheet and Dosing Calendar (Go to Encounters tab in chart review, and find the Anticoagulation Therapy Visit)        Franchesca Jarvis RN

## 2019-05-08 NOTE — PROGRESS NOTES
...Your patient was in for lab test today and there are pending orders in Epic. I drew JIC tubes.  Please review and tag any additional orders to the lab appointment or enter orders as a future and I will watch for them.  Thank you   Amy   @ [facility]

## 2019-05-31 ENCOUNTER — OFFICE VISIT (OUTPATIENT)
Dept: FAMILY MEDICINE | Facility: CLINIC | Age: 64
End: 2019-05-31
Payer: COMMERCIAL

## 2019-05-31 VITALS
SYSTOLIC BLOOD PRESSURE: 128 MMHG | DIASTOLIC BLOOD PRESSURE: 81 MMHG | TEMPERATURE: 98.1 F | HEART RATE: 95 BPM | BODY MASS INDEX: 26.63 KG/M2 | OXYGEN SATURATION: 98 % | HEIGHT: 70 IN | RESPIRATION RATE: 20 BRPM | WEIGHT: 186 LBS

## 2019-05-31 DIAGNOSIS — N52.01 ERECTILE DYSFUNCTION DUE TO ARTERIAL INSUFFICIENCY: ICD-10-CM

## 2019-05-31 DIAGNOSIS — I48.20 CHRONIC ATRIAL FIBRILLATION (H): Primary | ICD-10-CM

## 2019-05-31 DIAGNOSIS — I10 HYPERTENSION GOAL BP (BLOOD PRESSURE) < 140/90: ICD-10-CM

## 2019-05-31 DIAGNOSIS — E78.5 HYPERLIPIDEMIA LDL GOAL <130: ICD-10-CM

## 2019-05-31 PROCEDURE — 99214 OFFICE O/P EST MOD 30 MIN: CPT | Performed by: FAMILY MEDICINE

## 2019-05-31 RX ORDER — ATORVASTATIN CALCIUM 40 MG/1
40 TABLET, FILM COATED ORAL DAILY
Qty: 90 TABLET | Refills: 3 | Status: SHIPPED | OUTPATIENT
Start: 2019-05-31 | End: 2023-06-26

## 2019-05-31 RX ORDER — LISINOPRIL 2.5 MG/1
2.5 TABLET ORAL DAILY
Qty: 90 TABLET | Refills: 3 | Status: SHIPPED | OUTPATIENT
Start: 2019-05-31 | End: 2020-07-03

## 2019-05-31 RX ORDER — METOPROLOL SUCCINATE 100 MG/1
100 TABLET, EXTENDED RELEASE ORAL DAILY
Qty: 90 TABLET | Refills: 1 | Status: SHIPPED | OUTPATIENT
Start: 2019-05-31 | End: 2020-09-01

## 2019-05-31 RX ORDER — SILDENAFIL CITRATE 20 MG/1
TABLET ORAL
Qty: 20 TABLET | Refills: 3 | Status: SHIPPED | OUTPATIENT
Start: 2019-05-31

## 2019-05-31 RX ORDER — DILTIAZEM HYDROCHLORIDE 240 MG/1
240 CAPSULE, COATED, EXTENDED RELEASE ORAL DAILY
Qty: 90 CAPSULE | Refills: 1 | Status: SHIPPED | OUTPATIENT
Start: 2019-05-31 | End: 2019-12-11

## 2019-05-31 ASSESSMENT — MIFFLIN-ST. JEOR: SCORE: 1649.94

## 2019-05-31 NOTE — NURSING NOTE
"Chief Complaint   Patient presents with     Hypertension     Lipids       Initial /81   Pulse 95   Temp 98.1  F (36.7  C) (Oral)   Resp 20   Ht 1.778 m (5' 10\")   Wt 84.4 kg (186 lb)   SpO2 98%   BMI 26.69 kg/m   Estimated body mass index is 26.69 kg/m  as calculated from the following:    Height as of this encounter: 1.778 m (5' 10\").    Weight as of this encounter: 84.4 kg (186 lb).    Breanna Cooley, CMA    "

## 2019-05-31 NOTE — PROGRESS NOTES
"SUBJECTIVE:  Cameron Mariano, a 63 year old male scheduled an appointment to discuss the following issues:  Follow-up htn, high cholesterol, cardiolmyopathy, ed and a.fib  Needs more exercise but active at work.   No chest pain or shortness of breath. No nausea, vomiting or diarrhea or black or bloody stools. No urine changes or hematuria. Emotionally doing ok. S/p cardioversion x3 in past. No ablation. Last cardiology a couple years ago. Energy overall ok. No history mi.   Medical, social, surgical, and family histories reviewed.    ROS:  All other ROS negative     OBJECTIVE:  /81   Pulse 95   Temp 98.1  F (36.7  C) (Oral)   Resp 20   Ht 1.778 m (5' 10\")   Wt 84.4 kg (186 lb)   SpO2 98%   BMI 26.69 kg/m    EXAM:  GENERAL APPEARANCE: healthy, alert and no distress  EYES: EOMI,  PERRL  HENT: ear canals and TM's normal and nose and mouth without ulcers or lesions  NECK: no adenopathy, no asymmetry, masses, or scars and thyroid normal to palpation  RESP: lungs clear to auscultation - no rales, rhonchi or wheezes  CV: IRIR  ABDOMEN:  soft, nontender, no HSM or masses and bowel sounds normal  MS: extremities normal- no gross deformities noted, no evidence of inflammation in joints, FROM in all extremities.  PSYCH: mentation appears normal and affect normal/bright    ASSESSMENT / PLAN:  (E78.5) Hyperlipidemia LDL goal <130  Comment: stable  Plan: atorvastatin (LIPITOR) 40 MG tablet        Weight loss/exercise    (I10) Hypertension goal BP (blood pressure) < 140/90  Comment: stable  Plan: diltiazem ER COATED BEADS (CARTIA XT) 240 MG 24        hr capsule, lisinopril (PRINIVIL/ZESTRIL) 2.5         MG tablet, metoprolol succinate ER (TOPROL-XL)         100 MG 24 hr tablet        Self-monitor/limit sodium. Chest pain or shortness of breath to er.     (N52.01) Erectile dysfunction due to arterial insufficiency  Plan: sildenafil (REVATIO) 20 MG tablet        To ER if chest pain, shortness of breath , prolonged " erections      ASSESSMENT / PLAN:  (I48.2) Chronic atrial fibrillation (H)  (primary encounter diagnosis)  Comment: stable  Plan: continue INR RN/meds. Expected course and warning signs reviewed. Call/email with questions/concerns     Pablo Benz MD

## 2019-06-01 ENCOUNTER — THERAPY VISIT (OUTPATIENT)
Dept: PHYSICAL THERAPY | Facility: CLINIC | Age: 64
End: 2019-06-01
Payer: COMMERCIAL

## 2019-06-01 DIAGNOSIS — M75.02 ADHESIVE CAPSULITIS OF LEFT SHOULDER: ICD-10-CM

## 2019-06-01 PROCEDURE — 97110 THERAPEUTIC EXERCISES: CPT | Mod: GP | Performed by: PHYSICAL THERAPIST

## 2019-06-01 PROCEDURE — 97112 NEUROMUSCULAR REEDUCATION: CPT | Mod: GP | Performed by: PHYSICAL THERAPIST

## 2019-06-01 NOTE — PROGRESS NOTES
Subjective:  HPI                    Objective:  System    Physical Exam    General     ROS    Assessment/Plan:    PROGRESS  REPORT    Progress reporting period is from eval to 6-1-19.       SUBJECTIVE  Subjective: Continues to iimprove but does feel tight with certain motions. Overall 40% improved and about 60% of ful functional ability.     Current pain level is 3/10 Current Pain level: 3/10.     Initial Pain level: 5/10.   Changes in function:  Yes (See Goal flowsheet attached for changes in current functional level)  Adverse reaction to treatment or activity: None    OBJECTIVE  Changes noted in objective findings:  The objective findings below are from DOS 6-1-19.  Objective: L shldr AROM flex 150, abd 148, ER 42, PROM flex 155, Abd 155, ER 55.  Strength L shldr flex, abd 5/5, ER 4+/5, IR 5/5     ASSESSMENT/PLAN  Updated problem list and treatment plan: Diagnosis 1:  L shoulder pain (adhesive capsulitis)  Pain -  manual therapy, self management, education and home program  Decreased ROM/flexibility - manual therapy, therapeutic exercise and home program  Decreased strength - therapeutic exercise, therapeutic activities and home program  Impaired muscle performance - neuro re-education and home program  Decreased function - therapeutic activities and home program  STG/LTGs have been met or progress has been made towards goals:  Yes (See Goal flow sheet completed today.)  Assessment of Progress: The patient's condition is improving.  Patient is meeting short term goals and is progressing towards long term goals.  Self Management Plans:  Patient has been instructed in a home treatment program.  Patient  has been instructed in self management of symptoms.  Cameron continues to require the following intervention to meet STG and LTG's:  PT    Recommendations:  Continue with current PT POC.    Please refer to the daily flowsheet for treatment today, total treatment time and time spent performing 1:1 timed  codes.

## 2019-06-05 ENCOUNTER — ANTICOAGULATION THERAPY VISIT (OUTPATIENT)
Dept: NURSING | Facility: CLINIC | Age: 64
End: 2019-06-05
Payer: COMMERCIAL

## 2019-06-05 DIAGNOSIS — I48.20 CHRONIC ATRIAL FIBRILLATION (H): ICD-10-CM

## 2019-06-05 LAB — INR POINT OF CARE: 2.6 (ref 0.86–1.14)

## 2019-06-05 PROCEDURE — 85610 PROTHROMBIN TIME: CPT | Mod: QW

## 2019-06-05 PROCEDURE — 36416 COLLJ CAPILLARY BLOOD SPEC: CPT

## 2019-06-05 PROCEDURE — 99207 ZZC NO CHARGE NURSE ONLY: CPT

## 2019-06-05 NOTE — PROGRESS NOTES
ANTICOAGULATION FOLLOW-UP CLINIC VISIT    Patient Name:  Cameron Mariano  Date:  2019  Contact Type:  Face to Face    SUBJECTIVE:  Patient Findings     Comments:   The patient was assessed for diet, medication, and activity level changes, missed or extra doses, bruising or bleeding, with no problem findings.          Clinical Outcomes     Negatives:   Major bleeding event, Thromboembolic event, Anticoagulation-related hospital admission, Anticoagulation-related ED visit, Anticoagulation-related fatality    Comments:   The patient was assessed for diet, medication, and activity level changes, missed or extra doses, bruising or bleeding, with no problem findings.             OBJECTIVE    INR Protime   Date Value Ref Range Status   2019 2.6 (A) 0.86 - 1.14 Final       ASSESSMENT / PLAN  INR assessment THER    Recheck INR In: 4 WEEKS    INR Location Clinic      Anticoagulation Summary  As of 2019    INR goal:   2.0-3.0   TTR:   69.3 % (3.1 y)   INR used for dosin.6 (2019)   Warfarin maintenance plan:   10 mg (5 mg x 2) every Thu; 7.5 mg (5 mg x 1.5) all other days   Full warfarin instructions:   10 mg every Thu; 7.5 mg all other days   Weekly warfarin total:   55 mg   No change documented:   Franchesca Jarvis RN   Plan last modified:   Franchesca Jarvis RN (2018)   Next INR check:   7/3/2019   Target end date:       Indications    Chronic atrial fibrillation (H) [I48.2]  Long-term (current) use of anticoagulants [Z79.01] [Z79.01]             Anticoagulation Episode Summary     INR check location:       Preferred lab:       Send INR reminders to:   [facility]    Comments:         Anticoagulation Care Providers     Provider Role Specialty Phone number    Pablo Benz MD [facility] [phone]            See the Encounter Report to view Anticoagulation Flowsheet and Dosing Calendar (Go to Encounters tab in chart review, and find the Anticoagulation Therapy  Visit)        Franchesca Jarvis RN

## 2019-07-03 ENCOUNTER — ANTICOAGULATION THERAPY VISIT (OUTPATIENT)
Dept: NURSING | Facility: CLINIC | Age: 64
End: 2019-07-03
Payer: COMMERCIAL

## 2019-07-03 DIAGNOSIS — Z79.01 LONG TERM CURRENT USE OF ANTICOAGULANT THERAPY: ICD-10-CM

## 2019-07-03 DIAGNOSIS — I48.20 CHRONIC ATRIAL FIBRILLATION (H): ICD-10-CM

## 2019-07-03 LAB — INR POINT OF CARE: 1.7 (ref 0.86–1.14)

## 2019-07-03 PROCEDURE — 36416 COLLJ CAPILLARY BLOOD SPEC: CPT

## 2019-07-03 PROCEDURE — 85610 PROTHROMBIN TIME: CPT | Mod: QW

## 2019-07-03 NOTE — PROGRESS NOTES
ANTICOAGULATION FOLLOW-UP CLINIC VISIT    Patient Name:  Cameron Mariano  Date:  7/3/2019  Contact Type:  Face to Face    SUBJECTIVE:  Patient Findings     Comments:   Missed dose at least a week ago. Some extra salad in diet. No other changes.        Clinical Outcomes     Comments:   Missed dose at least a week ago. Some extra salad in diet. No other changes.           OBJECTIVE    INR Protime   Date Value Ref Range Status   2019 1.7 (A) 0.86 - 1.14 Final       ASSESSMENT / PLAN  INR assessment SUB    Recheck INR In: 2 WEEKS    INR Location Clinic      Anticoagulation Summary  As of 7/3/2019    INR goal:   2.0-3.0   TTR:   69.2 % (3.2 y)   INR used for dosin.7! (7/3/2019)   Warfarin maintenance plan:   10 mg (5 mg x 2) every Thu; 7.5 mg (5 mg x 1.5) all other days   Full warfarin instructions:   7/3: 10 mg; Otherwise 10 mg every Thu; 7.5 mg all other days   Weekly warfarin total:   55 mg   Plan last modified:   Franchesca Jarvis RN (2018)   Next INR check:   2019   Target end date:       Indications    Chronic atrial fibrillation (H) [I48.2]  Long-term (current) use of anticoagulants [Z79.01] [Z79.01]             Anticoagulation Episode Summary     INR check location:       Preferred lab:       Send INR reminders to:   CHAPO IZQUIERDO    Comments:         Anticoagulation Care Providers     Provider Role Specialty Phone number    Pablo Benz MD [facility] [phone]            See the Encounter Report to view Anticoagulation Flowsheet and Dosing Calendar (Go to Encounters tab in chart review, and find the Anticoagulation Therapy Visit)        Franchesca Jarvis RN

## 2019-07-17 ENCOUNTER — ANTICOAGULATION THERAPY VISIT (OUTPATIENT)
Dept: NURSING | Facility: CLINIC | Age: 64
End: 2019-07-17
Payer: COMMERCIAL

## 2019-07-17 DIAGNOSIS — I48.20 CHRONIC ATRIAL FIBRILLATION (H): ICD-10-CM

## 2019-07-17 DIAGNOSIS — Z79.01 LONG TERM CURRENT USE OF ANTICOAGULANT THERAPY: ICD-10-CM

## 2019-07-17 LAB — INR POINT OF CARE: 2.3 (ref 0.86–1.14)

## 2019-07-17 PROCEDURE — 85610 PROTHROMBIN TIME: CPT | Mod: QW

## 2019-07-17 PROCEDURE — 36416 COLLJ CAPILLARY BLOOD SPEC: CPT

## 2019-07-17 NOTE — PROGRESS NOTES
ANTICOAGULATION FOLLOW-UP CLINIC VISIT    Patient Name:  Cameron Mariano  Date:  2019  Contact Type:  Face to Face    SUBJECTIVE:  Patient Findings     Comments:   The patient was assessed for diet, medication, and activity level changes, missed or extra doses, bruising or bleeding, with no problem findings.          Clinical Outcomes     Negatives:   Major bleeding event, Thromboembolic event, Anticoagulation-related hospital admission, Anticoagulation-related ED visit, Anticoagulation-related fatality    Comments:   The patient was assessed for diet, medication, and activity level changes, missed or extra doses, bruising or bleeding, with no problem findings.             OBJECTIVE    INR Protime   Date Value Ref Range Status   2019 2.3 (A) 0.86 - 1.14 Final       ASSESSMENT / PLAN  INR assessment THER    Recheck INR In: 4 WEEKS    INR Location Clinic      Anticoagulation Summary  As of 2019    INR goal:   2.0-3.0   TTR:   69.0 % (3.2 y)   INR used for dosin.3 (2019)   Warfarin maintenance plan:   10 mg (5 mg x 2) every Thu; 7.5 mg (5 mg x 1.5) all other days   Full warfarin instructions:   10 mg every Thu; 7.5 mg all other days   Weekly warfarin total:   55 mg   No change documented:   Franchesca Jarvis RN   Plan last modified:   Franchesca Jarvis RN (2018)   Next INR check:   2019   Target end date:       Indications    Chronic atrial fibrillation (H) [I48.2]  Long-term (current) use of anticoagulants [Z79.01] [Z79.01]             Anticoagulation Episode Summary     INR check location:       Preferred lab:       Send INR reminders to:   CHAPO IZQUIERDO    Comments:         Anticoagulation Care Providers     Provider Role Specialty Phone number    Pablo Benz MD [facility] [phone]            See the Encounter Report to view Anticoagulation Flowsheet and Dosing Calendar (Go to Encounters tab in chart review, and find the Anticoagulation Therapy  Visit)        Franchesca Jarvis RN

## 2019-07-22 ENCOUNTER — ANCILLARY PROCEDURE (OUTPATIENT)
Dept: GENERAL RADIOLOGY | Facility: CLINIC | Age: 64
End: 2019-07-22
Attending: FAMILY MEDICINE
Payer: COMMERCIAL

## 2019-07-22 ENCOUNTER — ALLIED HEALTH/NURSE VISIT (OUTPATIENT)
Dept: FAMILY MEDICINE | Facility: CLINIC | Age: 64
End: 2019-07-22
Payer: COMMERCIAL

## 2019-07-22 VITALS
DIASTOLIC BLOOD PRESSURE: 61 MMHG | RESPIRATION RATE: 24 BRPM | OXYGEN SATURATION: 96 % | HEART RATE: 64 BPM | SYSTOLIC BLOOD PRESSURE: 94 MMHG | TEMPERATURE: 98.3 F

## 2019-07-22 DIAGNOSIS — J20.9 ACUTE BRONCHITIS, UNSPECIFIED ORGANISM: ICD-10-CM

## 2019-07-22 DIAGNOSIS — R05.9 COUGH: ICD-10-CM

## 2019-07-22 DIAGNOSIS — R05.9 COUGH: Primary | ICD-10-CM

## 2019-07-22 DIAGNOSIS — I48.20 CHRONIC ATRIAL FIBRILLATION (H): ICD-10-CM

## 2019-07-22 PROCEDURE — 99214 OFFICE O/P EST MOD 30 MIN: CPT | Performed by: FAMILY MEDICINE

## 2019-07-22 PROCEDURE — 71046 X-RAY EXAM CHEST 2 VIEWS: CPT

## 2019-07-22 RX ORDER — AZITHROMYCIN 250 MG/1
TABLET, FILM COATED ORAL
Qty: 6 TABLET | Refills: 0 | Status: SHIPPED | OUTPATIENT
Start: 2019-07-22 | End: 2019-08-14

## 2019-07-22 NOTE — PROGRESS NOTES
Subjective     Cameron Mariano is a 64 year old male who presents to clinic today for the following health issues:    HPI   RESPIRATORY SYMPTOMS      Duration: 3 days ago developed a cough, sore throat, following patient report wheezing.     Description  nasal congestion, sore throat, cough, wheezing, chills, hoarse voice and myalgias    Severity: severe    Accompanying signs and symptoms: patient attempted to go back to work today, shortness of breath, lightheaded, weak, felt as though he would pass out.  Symptoms have improved but shortness of breath continues.      History (predisposing factors):  none    Precipitating or alleviating factors: rest    Therapies tried and outcome:  rest and fluids oral decongestant, over the counter     Wheezing bilateral lower lobes.      Started with scratchy throat on Friday  On Saturday could hear wheezing with exp and ins and lots of phlegm  Notes congestion and watery eyes  Was resting and felt like it was getting better  Now at work today and has to do some heavy lifting and noted lightheadedness and sob    Pt with chronic afib on coumadin      Reviewed and updated as needed this visit by Provider         Review of Systems   ROS COMP: Constitutional, HEENT, cardiovascular, pulmonary, gi and gu systems are negative, except as otherwise noted.      Objective    BP 94/61 (BP Location: Right arm, Patient Position: Sitting, Cuff Size: Adult Regular)   Pulse 64   Temp 98.3  F (36.8  C) (Oral)   SpO2 96%   There is no height or weight on file to calculate BMI.  Physical Exam   GENERAL: healthy, alert and no distress  HENT: ear canals and TM's normal, nose and mouth without ulcers or lesions  NECK: no adenopathy, no asymmetry, masses, or scars and thyroid normal to palpation  RESP: lungs clear to auscultation - no rales, rhonchi or wheezes and expiratory wheezes bibasilar  CV: regular rate and rhythm, normal S1 S2, no S3 or S4, no murmur, click or rub, no peripheral edema and  "peripheral pulses strong  MS: no gross musculoskeletal defects noted, no edema    Diagnostic Test Results:  Labs reviewed in Epic  CXR - negative        Assessment & Plan     1. Cough    - XR Chest 2 Views; Future    2. Acute bronchitis, unspecified organism  Follow-up if not improving, off work til lightheadedness resolves with coughing.   - azithromycin (ZITHROMAX) 250 MG tablet; Take 2 tablets (500 mg) by mouth daily for 1 day, THEN 1 tablet (250 mg) daily for 4 days.  Dispense: 6 tablet; Refill: 0       (I48.2) Chronic atrial fibrillation (H)  Comment: recommend follow-up with INR nurse later this week to recheck INR given abx usage  Plan:           BMI:   Estimated body mass index is 26.69 kg/m  as calculated from the following:    Height as of 5/31/19: 1.778 m (5' 10\").    Weight as of 5/31/19: 84.4 kg (186 lb).               No follow-ups on file.    Haylee Cueva MD  [facility]        "

## 2019-08-05 PROBLEM — M75.02 ADHESIVE CAPSULITIS OF LEFT SHOULDER: Status: RESOLVED | Noted: 2019-04-13 | Resolved: 2019-08-05

## 2019-08-05 NOTE — PROGRESS NOTES
Pt last seen in PT 06/01.  He has since cancelled appts.  Consider note from that date to serve as final summary.

## 2019-08-14 ENCOUNTER — ANTICOAGULATION THERAPY VISIT (OUTPATIENT)
Dept: NURSING | Facility: CLINIC | Age: 64
End: 2019-08-14
Payer: COMMERCIAL

## 2019-08-14 DIAGNOSIS — I48.20 CHRONIC ATRIAL FIBRILLATION (H): ICD-10-CM

## 2019-08-14 DIAGNOSIS — Z79.01 LONG TERM CURRENT USE OF ANTICOAGULANT THERAPY: ICD-10-CM

## 2019-08-14 LAB — INR POINT OF CARE: 2 (ref 0.86–1.14)

## 2019-08-14 PROCEDURE — 36416 COLLJ CAPILLARY BLOOD SPEC: CPT

## 2019-08-14 PROCEDURE — 85610 PROTHROMBIN TIME: CPT | Mod: QW

## 2019-08-14 NOTE — PROGRESS NOTES
ANTICOAGULATION FOLLOW-UP CLINIC VISIT    Patient Name:  Cameron Mariano  Date:  2019  Contact Type:  Face to Face    SUBJECTIVE:  Patient Findings     Comments:   Treated for bronchitis on  with z-pac and says his symptoms have resolved. No other changes.         Clinical Outcomes     Comments:   Treated for bronchitis on  with z-pac and says his symptoms have resolved. No other changes.            OBJECTIVE    INR Protime   Date Value Ref Range Status   2019 2.0 (A) 0.86 - 1.14 Final       ASSESSMENT / PLAN  INR assessment THER    Recheck INR In: 4 WEEKS    INR Location Clinic      Anticoagulation Summary  As of 2019    INR goal:   2.0-3.0   TTR:   69.7 % (3.3 y)   INR used for dosin.0 (2019)   Warfarin maintenance plan:   10 mg (5 mg x 2) every Thu; 7.5 mg (5 mg x 1.5) all other days   Full warfarin instructions:   10 mg every Thu; 7.5 mg all other days   Weekly warfarin total:   55 mg   No change documented:   Franchesca Jarvis RN   Plan last modified:   Franchesca Jarvis RN (2018)   Next INR check:   2019   Target end date:       Indications    Chronic atrial fibrillation (H) [I48.2]  Long-term (current) use of anticoagulants [Z79.01] [Z79.01]             Anticoagulation Episode Summary     INR check location:       Preferred lab:       Send INR reminders to:   CHAPO IZQUIERDO    Comments:         Anticoagulation Care Providers     Provider Role Specialty Phone number    Pablo Benz MD [facility] [phone]            See the Encounter Report to view Anticoagulation Flowsheet and Dosing Calendar (Go to Encounters tab in chart review, and find the Anticoagulation Therapy Visit)        Franchesca Jarvis RN

## 2019-08-26 DIAGNOSIS — I48.20 CHRONIC ATRIAL FIBRILLATION (H): ICD-10-CM

## 2019-08-26 DIAGNOSIS — Z79.01 LONG TERM CURRENT USE OF ANTICOAGULANT THERAPY: ICD-10-CM

## 2019-08-27 RX ORDER — WARFARIN SODIUM 5 MG/1
TABLET ORAL
Qty: 138 TABLET | Refills: 1 | Status: SHIPPED | OUTPATIENT
Start: 2019-08-27 | End: 2020-03-25

## 2019-09-11 ENCOUNTER — ANTICOAGULATION THERAPY VISIT (OUTPATIENT)
Dept: NURSING | Facility: CLINIC | Age: 64
End: 2019-09-11
Payer: COMMERCIAL

## 2019-09-11 DIAGNOSIS — Z79.01 LONG TERM CURRENT USE OF ANTICOAGULANT THERAPY: ICD-10-CM

## 2019-09-11 DIAGNOSIS — I48.20 CHRONIC ATRIAL FIBRILLATION (H): ICD-10-CM

## 2019-09-11 LAB — INR POINT OF CARE: 1.9 (ref 0.86–1.14)

## 2019-09-11 PROCEDURE — 85610 PROTHROMBIN TIME: CPT | Mod: QW

## 2019-09-11 PROCEDURE — 36416 COLLJ CAPILLARY BLOOD SPEC: CPT

## 2019-09-11 NOTE — PROGRESS NOTES
ANTICOAGULATION FOLLOW-UP CLINIC VISIT    Patient Name:  Cameron Mariano  Date:  2019  Contact Type:  Face to Face    SUBJECTIVE:  Patient Findings     Positives:   Missed doses        Clinical Outcomes     Negatives:   Major bleeding event, Thromboembolic event, Anticoagulation-related hospital admission, Anticoagulation-related ED visit, Anticoagulation-related fatality           OBJECTIVE    INR Protime   Date Value Ref Range Status   2019 1.9 (A) 0.86 - 1.14 Final       ASSESSMENT / PLAN  INR assessment SUB    Recheck INR In: 4 WEEKS    INR Location Clinic      Anticoagulation Summary  As of 2019    INR goal:   2.0-3.0   TTR:   68.2 % (3.4 y)   INR used for dosin.9! (2019)   Warfarin maintenance plan:   10 mg (5 mg x 2) every Thu; 7.5 mg (5 mg x 1.5) all other days   Full warfarin instructions:   : 12.5 mg; Otherwise 10 mg every Thu; 7.5 mg all other days   Weekly warfarin total:   55 mg   Plan last modified:   Franchesca Jarvis RN (2018)   Next INR check:   10/9/2019   Target end date:       Indications    Chronic atrial fibrillation (H) [I48.2]  Long-term (current) use of anticoagulants [Z79.01] [Z79.01]             Anticoagulation Episode Summary     INR check location:       Preferred lab:       Send INR reminders to:   CHAPO IZQUIERDO    Comments:         Anticoagulation Care Providers     Provider Role Specialty Phone number    Pablo Benz MD [facility] [phone]            See the Encounter Report to view Anticoagulation Flowsheet and Dosing Calendar (Go to Encounters tab in chart review, and find the Anticoagulation Therapy Visit)        Franchesca Jarvis RN

## 2019-10-09 ENCOUNTER — ANTICOAGULATION THERAPY VISIT (OUTPATIENT)
Dept: NURSING | Facility: CLINIC | Age: 64
End: 2019-10-09
Payer: COMMERCIAL

## 2019-10-09 DIAGNOSIS — I48.20 CHRONIC ATRIAL FIBRILLATION (H): ICD-10-CM

## 2019-10-09 DIAGNOSIS — Z79.01 LONG TERM CURRENT USE OF ANTICOAGULANT THERAPY: ICD-10-CM

## 2019-10-09 DIAGNOSIS — Z23 NEED FOR PROPHYLACTIC VACCINATION AND INOCULATION AGAINST INFLUENZA: Primary | ICD-10-CM

## 2019-10-09 LAB — INR POINT OF CARE: 2.5 (ref 0.86–1.14)

## 2019-10-09 PROCEDURE — 90682 RIV4 VACC RECOMBINANT DNA IM: CPT

## 2019-10-09 PROCEDURE — 90471 IMMUNIZATION ADMIN: CPT

## 2019-10-09 PROCEDURE — 36416 COLLJ CAPILLARY BLOOD SPEC: CPT

## 2019-10-09 PROCEDURE — 85610 PROTHROMBIN TIME: CPT | Mod: QW

## 2019-10-09 NOTE — PROGRESS NOTES
ANTICOAGULATION FOLLOW-UP CLINIC VISIT    Patient Name:  Cameron Mariano  Date:  10/9/2019  Contact Type:  Face to Face    SUBJECTIVE:  Patient Findings     Comments:   The patient was assessed for diet, medication, and activity level changes, missed or extra doses, bruising or bleeding, with no problem findings.          Clinical Outcomes     Comments:   The patient was assessed for diet, medication, and activity level changes, missed or extra doses, bruising or bleeding, with no problem findings.             OBJECTIVE    INR Protime   Date Value Ref Range Status   10/09/2019 2.5 (A) 0.86 - 1.14 Final       ASSESSMENT / PLAN  INR assessment THER    Recheck INR In: 4 WEEKS    INR Location Clinic      Anticoagulation Summary  As of 10/9/2019    INR goal:   2.0-3.0   TTR:   68.5 % (3.5 y)   INR used for dosin.5 (10/9/2019)   Warfarin maintenance plan:   10 mg (5 mg x 2) every Thu; 7.5 mg (5 mg x 1.5) all other days   Full warfarin instructions:   10 mg every Thu; 7.5 mg all other days   Weekly warfarin total:   55 mg   No change documented:   Franchesca Jarvis RN   Plan last modified:   Franchesca Jarvis RN (2018)   Next INR check:   2019   Target end date:       Indications    Chronic atrial fibrillation [I48.20]  Long-term (current) use of anticoagulants [Z79.01] [Z79.01]             Anticoagulation Episode Summary     INR check location:       Preferred lab:       Send INR reminders to:   CHAPO IZQUIERDO    Comments:         Anticoagulation Care Providers     Provider Role Specialty Phone number    Pablo Benz MD [facility] [phone]            See the Encounter Report to view Anticoagulation Flowsheet and Dosing Calendar (Go to Encounters tab in chart review, and find the Anticoagulation Therapy Visit)        Franchesca Jarvis RN

## 2019-11-06 ENCOUNTER — ANTICOAGULATION THERAPY VISIT (OUTPATIENT)
Dept: NURSING | Facility: CLINIC | Age: 64
End: 2019-11-06
Payer: COMMERCIAL

## 2019-11-06 DIAGNOSIS — I48.20 CHRONIC ATRIAL FIBRILLATION (H): ICD-10-CM

## 2019-11-06 DIAGNOSIS — Z79.01 LONG TERM CURRENT USE OF ANTICOAGULANT THERAPY: ICD-10-CM

## 2019-11-06 LAB — INR POINT OF CARE: 2.5 (ref 0.86–1.14)

## 2019-11-06 PROCEDURE — 85610 PROTHROMBIN TIME: CPT | Mod: QW

## 2019-11-06 PROCEDURE — 36416 COLLJ CAPILLARY BLOOD SPEC: CPT

## 2019-11-06 NOTE — PROGRESS NOTES
ANTICOAGULATION FOLLOW-UP CLINIC VISIT    Patient Name:  Cameron Mariano  Date:  2019  Contact Type:  Face to Face    SUBJECTIVE:  Patient Findings     Comments:   The patient was assessed for diet, medication, and activity level changes, missed or extra doses, bruising or bleeding, with no problem findings.          Clinical Outcomes     Comments:   The patient was assessed for diet, medication, and activity level changes, missed or extra doses, bruising or bleeding, with no problem findings.             OBJECTIVE    INR Protime   Date Value Ref Range Status   2019 2.5 (A) 0.86 - 1.14 Final       ASSESSMENT / PLAN  INR assessment THER    Recheck INR In: 5 WEEKS    INR Location Clinic      Anticoagulation Summary  As of 2019    INR goal:   2.0-3.0   TTR:   69.2 % (3.5 y)   INR used for dosin.5 (2019)   Warfarin maintenance plan:   10 mg (5 mg x 2) every Thu; 7.5 mg (5 mg x 1.5) all other days   Full warfarin instructions:   10 mg every Thu; 7.5 mg all other days   Weekly warfarin total:   55 mg   No change documented:   Franchesca Jarvis RN   Plan last modified:   Franchesca Jarvis RN (2018)   Next INR check:   2019   Target end date:       Indications    Chronic atrial fibrillation [I48.20]  Long-term (current) use of anticoagulants [Z79.01] [Z79.01]             Anticoagulation Episode Summary     INR check location:       Preferred lab:       Send INR reminders to:   CHAPO IZQUIERDO    Comments:         Anticoagulation Care Providers     Provider Role Specialty Phone number    Pablo Benz MD [facility] [phone]            See the Encounter Report to view Anticoagulation Flowsheet and Dosing Calendar (Go to Encounters tab in chart review, and find the Anticoagulation Therapy Visit)        Franchesca Jarvis RN

## 2019-12-11 ENCOUNTER — ANTICOAGULATION THERAPY VISIT (OUTPATIENT)
Dept: NURSING | Facility: CLINIC | Age: 64
End: 2019-12-11
Payer: COMMERCIAL

## 2019-12-11 DIAGNOSIS — I48.20 CHRONIC ATRIAL FIBRILLATION (H): ICD-10-CM

## 2019-12-11 DIAGNOSIS — I10 HYPERTENSION GOAL BP (BLOOD PRESSURE) < 140/90: ICD-10-CM

## 2019-12-11 DIAGNOSIS — Z79.01 LONG TERM CURRENT USE OF ANTICOAGULANT THERAPY: ICD-10-CM

## 2019-12-11 LAB — INR POINT OF CARE: 2.7 (ref 0.86–1.14)

## 2019-12-11 PROCEDURE — 36416 COLLJ CAPILLARY BLOOD SPEC: CPT

## 2019-12-11 PROCEDURE — 85610 PROTHROMBIN TIME: CPT | Mod: QW

## 2019-12-11 RX ORDER — DILTIAZEM HYDROCHLORIDE 240 MG/1
240 CAPSULE, COATED, EXTENDED RELEASE ORAL DAILY
Qty: 90 CAPSULE | Refills: 0 | Status: SHIPPED | OUTPATIENT
Start: 2019-12-11 | End: 2020-07-03

## 2019-12-11 NOTE — PROGRESS NOTES
ANTICOAGULATION FOLLOW-UP CLINIC VISIT    Patient Name:  Cameron Mariano  Date:  2019  Contact Type:  Telephone    SUBJECTIVE:  Patient Findings     Comments:   The patient was assessed for diet, medication, and activity level changes, missed or extra doses, bruising or bleeding, with no problem findings.          Clinical Outcomes     Negatives:   Major bleeding event, Thromboembolic event, Anticoagulation-related hospital admission, Anticoagulation-related ED visit, Anticoagulation-related fatality    Comments:   The patient was assessed for diet, medication, and activity level changes, missed or extra doses, bruising or bleeding, with no problem findings.             OBJECTIVE    INR Protime   Date Value Ref Range Status   2019 2.7 (A) 0.86 - 1.14 Final       ASSESSMENT / PLAN  INR assessment THER    Recheck INR In: 5 WEEKS    INR Location Clinic      Anticoagulation Summary  As of 2019    INR goal:   2.0-3.0   TTR:   72.4 % (1 y)   INR used for dosin.7 (2019)   Warfarin maintenance plan:   10 mg (5 mg x 2) every Thu; 7.5 mg (5 mg x 1.5) all other days   Full warfarin instructions:   10 mg every Thu; 7.5 mg all other days   Weekly warfarin total:   55 mg   No change documented:   Franchesca Jarvis RN   Plan last modified:   Franchesca Jarvis RN (2018)   Next INR check:   1/15/2020   Priority:   Maintenance   Target end date:       Indications    Chronic atrial fibrillation [I48.20]  Long-term (current) use of anticoagulants [Z79.01] [Z79.01]             Anticoagulation Episode Summary     INR check location:       Preferred lab:       Send INR reminders to:   CHAPO IZQUIERDO    Comments:         Anticoagulation Care Providers     Provider Role Specialty Phone number    Pablo Benz MD [facility] [phone]            See the Encounter Report to view Anticoagulation Flowsheet and Dosing Calendar (Go to Encounters tab in chart review, and find the  Anticoagulation Therapy Visit)        Franchesca Jarvis RN

## 2020-01-15 ENCOUNTER — ANTICOAGULATION THERAPY VISIT (OUTPATIENT)
Dept: NURSING | Facility: CLINIC | Age: 65
End: 2020-01-15
Payer: COMMERCIAL

## 2020-01-15 DIAGNOSIS — I48.20 CHRONIC ATRIAL FIBRILLATION (H): ICD-10-CM

## 2020-01-15 DIAGNOSIS — Z79.01 LONG TERM CURRENT USE OF ANTICOAGULANT THERAPY: ICD-10-CM

## 2020-01-15 LAB — INR POINT OF CARE: 4.5 (ref 0.86–1.14)

## 2020-01-15 PROCEDURE — 36416 COLLJ CAPILLARY BLOOD SPEC: CPT

## 2020-01-15 PROCEDURE — 85610 PROTHROMBIN TIME: CPT | Mod: QW

## 2020-01-15 NOTE — PROGRESS NOTES
ANTICOAGULATION FOLLOW-UP CLINIC VISIT    Patient Name:  Cameron Mariano  Date:  1/15/2020  Contact Type:  Face to Face    SUBJECTIVE:  Patient Findings     Positives:   Change in alcohol use, Extra doses, Change in diet/appetite    Comments:   Just returned from trip to [place]. Diet lacking in salad and added alcohol. Thought he missed a dose while there so took extra dose 2 days. Took warfarin this morning. Will hold tomorrow and he will resume salad in diet.         Clinical Outcomes     Negatives:   Major bleeding event, Thromboembolic event, Anticoagulation-related hospital admission, Anticoagulation-related ED visit, Anticoagulation-related fatality    Comments:   Just returned from trip to [place]. Diet lacking in salad and added alcohol. Thought he missed a dose while there so took extra dose 2 days. Took warfarin this morning. Will hold tomorrow and he will resume salad in diet.            OBJECTIVE    INR Protime   Date Value Ref Range Status   01/15/2020 4.5 (A) 0.86 - 1.14 Final       ASSESSMENT / PLAN  INR assessment SUPRA    Recheck INR In: 2 WEEKS    INR Location Clinic      Anticoagulation Summary  As of 1/15/2020    INR goal:   2.0-3.0   TTR:   73.0 % (1 y)   INR used for dosin.5! (1/15/2020)   Warfarin maintenance plan:   10 mg (5 mg x 2) every Thu; 7.5 mg (5 mg x 1.5) all other days   Full warfarin instructions:   : Hold; Otherwise 10 mg every Thu; 7.5 mg all other days   Weekly warfarin total:   55 mg   Plan last modified:   Franchesca Jarvis RN (2018)   Next INR check:   2020   Priority:   High   Target end date:       Indications    Chronic atrial fibrillation [I48.20]  Long-term (current) use of anticoagulants [Z79.01] [Z79.01]             Anticoagulation Episode Summary     INR check location:       Preferred lab:       Send INR reminders to:   CHAPO IZQUIERDO    Comments:         Anticoagulation Care Providers     Provider Role Specialty Phone number    Pablo Benz MD  [facility] [phone]            See the Encounter Report to view Anticoagulation Flowsheet and Dosing Calendar (Go to Encounters tab in chart review, and find the Anticoagulation Therapy Visit)        Franchesca Jarvis RN

## 2020-01-29 ENCOUNTER — ANTICOAGULATION THERAPY VISIT (OUTPATIENT)
Dept: NURSING | Facility: CLINIC | Age: 65
End: 2020-01-29
Payer: COMMERCIAL

## 2020-01-29 DIAGNOSIS — I48.20 CHRONIC ATRIAL FIBRILLATION (H): ICD-10-CM

## 2020-01-29 DIAGNOSIS — Z79.01 LONG TERM CURRENT USE OF ANTICOAGULANT THERAPY: ICD-10-CM

## 2020-01-29 LAB — INR POINT OF CARE: 2 (ref 0.86–1.14)

## 2020-01-29 PROCEDURE — 36416 COLLJ CAPILLARY BLOOD SPEC: CPT

## 2020-01-29 PROCEDURE — 85610 PROTHROMBIN TIME: CPT | Mod: QW

## 2020-01-29 NOTE — PROGRESS NOTES
ANTICOAGULATION FOLLOW-UP CLINIC VISIT    Patient Name:  Camerno Mariano  Date:  2020  Contact Type:  Face to Face    SUBJECTIVE:  Patient Findings     Comments:   The patient was assessed for diet, medication, and activity level changes, missed or extra doses, bruising or bleeding, with no problem findings.          Clinical Outcomes     Negatives:   Major bleeding event, Thromboembolic event, Anticoagulation-related hospital admission, Anticoagulation-related ED visit, Anticoagulation-related fatality    Comments:   The patient was assessed for diet, medication, and activity level changes, missed or extra doses, bruising or bleeding, with no problem findings.             OBJECTIVE    INR Protime   Date Value Ref Range Status   2020 2.0 (A) 0.86 - 1.14 Final       ASSESSMENT / PLAN  INR assessment THER    Recheck INR In: 5 WEEKS    INR Location Clinic      Anticoagulation Summary  As of 2020    INR goal:   2.0-3.0   TTR:   70.7 % (1 y)   INR used for dosin.0 (2020)   Warfarin maintenance plan:   10 mg (5 mg x 2) every Thu; 7.5 mg (5 mg x 1.5) all other days   Full warfarin instructions:   10 mg every Thu; 7.5 mg all other days   Weekly warfarin total:   55 mg   No change documented:   Franchesca Jarvis RN   Plan last modified:   Franchesca Jarvis RN (2018)   Next INR check:   3/6/2020   Priority:   Maintenance   Target end date:       Indications    Chronic atrial fibrillation [I48.20]  Long-term (current) use of anticoagulants [Z79.01] [Z79.01]             Anticoagulation Episode Summary     INR check location:       Preferred lab:       Send INR reminders to:   CHAPO IZQUIERDO    Comments:         Anticoagulation Care Providers     Provider Role Specialty Phone number    Pablo Benz MD [facility] [phone]            See the Encounter Report to view Anticoagulation Flowsheet and Dosing Calendar (Go to Encounters tab in chart review, and find the  Anticoagulation Therapy Visit)        Franchesca Jarvis RN

## 2020-02-24 ENCOUNTER — HEALTH MAINTENANCE LETTER (OUTPATIENT)
Age: 65
End: 2020-02-24

## 2020-03-06 ENCOUNTER — TELEPHONE (OUTPATIENT)
Dept: FAMILY MEDICINE | Facility: CLINIC | Age: 65
End: 2020-03-06

## 2020-03-06 ENCOUNTER — ANTICOAGULATION THERAPY VISIT (OUTPATIENT)
Dept: NURSING | Facility: CLINIC | Age: 65
End: 2020-03-06
Payer: COMMERCIAL

## 2020-03-06 DIAGNOSIS — I48.20 CHRONIC ATRIAL FIBRILLATION (H): ICD-10-CM

## 2020-03-06 DIAGNOSIS — I48.20 CHRONIC ATRIAL FIBRILLATION (H): Primary | ICD-10-CM

## 2020-03-06 DIAGNOSIS — Z79.01 LONG TERM CURRENT USE OF ANTICOAGULANT THERAPY: ICD-10-CM

## 2020-03-06 LAB — INR POINT OF CARE: 2.1 (ref 0.86–1.14)

## 2020-03-06 PROCEDURE — 36416 COLLJ CAPILLARY BLOOD SPEC: CPT

## 2020-03-06 PROCEDURE — 85610 PROTHROMBIN TIME: CPT | Mod: QW

## 2020-03-06 NOTE — PROGRESS NOTES
ANTICOAGULATION FOLLOW-UP CLINIC VISIT    Patient Name:  Cameron Mariano  Date:  3/6/2020  Contact Type:  Face to Face    SUBJECTIVE:  Patient Findings     Comments:   The patient was assessed for diet, medication, and activity level changes, missed or extra doses, bruising or bleeding, with no problem findings.          Clinical Outcomes     Comments:   The patient was assessed for diet, medication, and activity level changes, missed or extra doses, bruising or bleeding, with no problem findings.             OBJECTIVE    INR Protime   Date Value Ref Range Status   2020 2.1 (A) 0.86 - 1.14 Final       ASSESSMENT / PLAN  INR assessment THER    Recheck INR In: 5 WEEKS    INR Location Clinic      Anticoagulation Summary  As of 3/6/2020    INR goal:   2.0-3.0   TTR:   72.8 % (1 y)   INR used for dosin.1 (3/6/2020)   Warfarin maintenance plan:   10 mg (5 mg x 2) every Thu; 7.5 mg (5 mg x 1.5) all other days   Full warfarin instructions:   10 mg every Thu; 7.5 mg all other days   Weekly warfarin total:   55 mg   No change documented:   Franchesca Jarvis RN   Plan last modified:   Franchesca Jarvis RN (2018)   Next INR check:   4/10/2020   Priority:   Maintenance   Target end date:       Indications    Chronic atrial fibrillation [I48.20]  Long-term (current) use of anticoagulants [Z79.01] [Z79.01]             Anticoagulation Episode Summary     INR check location:       Preferred lab:       Send INR reminders to:   CHAPO IZQUIERDO    Comments:         Anticoagulation Care Providers     Provider Role Specialty Phone number    Pablo Benz MD [facility] [phone]            See the Encounter Report to view Anticoagulation Flowsheet and Dosing Calendar (Go to Encounters tab in chart review, and find the Anticoagulation Therapy Visit)        Franchesca Jarvis RN

## 2020-03-06 NOTE — TELEPHONE ENCOUNTER
Has the patient previously taken warfarin? yes  If yes, for what indication? A-fib    Does the patient have any of the following indications for a higher range of 2.5-3.5:    Mitral position mechanical valve? no    Larissa-Shiley, Ball and Cage or Monoleaflet valve (regardless of position) no    Other (if yes, please explain) no    Annual anticoagulation referral needed. Order pended.  Thank you. Franchesca Jarvis RN

## 2020-03-25 DIAGNOSIS — I48.20 CHRONIC ATRIAL FIBRILLATION (H): ICD-10-CM

## 2020-03-25 DIAGNOSIS — Z79.01 LONG TERM CURRENT USE OF ANTICOAGULANT THERAPY: ICD-10-CM

## 2020-03-25 RX ORDER — WARFARIN SODIUM 5 MG/1
TABLET ORAL
Qty: 138 TABLET | Refills: 0 | Status: SHIPPED | OUTPATIENT
Start: 2020-03-25 | End: 2020-09-09

## 2020-03-30 ENCOUNTER — TELEPHONE (OUTPATIENT)
Dept: FAMILY MEDICINE | Facility: CLINIC | Age: 65
End: 2020-03-30

## 2020-03-30 DIAGNOSIS — Z79.01 LONG TERM CURRENT USE OF ANTICOAGULANT THERAPY: ICD-10-CM

## 2020-03-30 DIAGNOSIS — I48.20 CHRONIC ATRIAL FIBRILLATION (H): Primary | ICD-10-CM

## 2020-03-30 NOTE — TELEPHONE ENCOUNTER
Left message for patient to call back to schedule line to reschedule INR apt to a lab only apt. If questions he can call me back. Franchesca Jarvis RN

## 2020-03-30 NOTE — TELEPHONE ENCOUNTER
Reason for Call:  Other call back    Detailed comments: patient is calling stating INR RN called, stating needing to reschedule appts. Please call to discuss. Thank you.    Phone Number Patient can be reached at: Home number on file [phone] (home)    Best Time:     Can we leave a detailed message on this number? YES    Call taken on 3/30/2020 at 12:20 PM by Jahaira Zaragoza

## 2020-04-09 DIAGNOSIS — Z79.01 LONG TERM CURRENT USE OF ANTICOAGULANT THERAPY: ICD-10-CM

## 2020-04-09 DIAGNOSIS — I48.20 CHRONIC ATRIAL FIBRILLATION (H): ICD-10-CM

## 2020-04-09 LAB
CAPILLARY BLOOD COLLECTION: NORMAL
INR PPP: 2.3 (ref 0.86–1.14)

## 2020-04-09 PROCEDURE — 36416 COLLJ CAPILLARY BLOOD SPEC: CPT | Performed by: FAMILY MEDICINE

## 2020-04-09 PROCEDURE — 85610 PROTHROMBIN TIME: CPT | Performed by: FAMILY MEDICINE

## 2020-04-10 ENCOUNTER — ANTICOAGULATION THERAPY VISIT (OUTPATIENT)
Dept: NURSING | Facility: CLINIC | Age: 65
End: 2020-04-10
Payer: COMMERCIAL

## 2020-04-10 DIAGNOSIS — I48.20 CHRONIC ATRIAL FIBRILLATION (H): ICD-10-CM

## 2020-04-10 DIAGNOSIS — Z79.01 LONG TERM CURRENT USE OF ANTICOAGULANT THERAPY: ICD-10-CM

## 2020-04-10 PROCEDURE — 99207 ZZC NO CHARGE NURSE ONLY: CPT

## 2020-04-10 NOTE — PROGRESS NOTES
ANTICOAGULATION FOLLOW-UP CLINIC VISIT    Patient Name:  Cameron Mariano  Date:  4/10/2020  Contact Type:  Telephone    SUBJECTIVE:  Patient Findings     Comments:   Therapeutic. Discussed results with patient. The patient was assessed for diet, medication, and activity level changes, missed or extra doses, bruising or bleeding, with no problem findings. Continue same and patient verbalized understanding.         Clinical Outcomes     Negatives:   Major bleeding event, Thromboembolic event, Anticoagulation-related hospital admission, Anticoagulation-related ED visit, Anticoagulation-related fatality    Comments:   Therapeutic. Discussed results with patient. The patient was assessed for diet, medication, and activity level changes, missed or extra doses, bruising or bleeding, with no problem findings. Continue same and patient verbalized understanding.            OBJECTIVE    INR   Date Value Ref Range Status   2020 2.30 (H) 0.86 - 1.14 Final     Comment:     This test is intended for monitoring Coumadin therapy.  Results are not   accurate in patients with prolonged INR due to factor deficiency.         ASSESSMENT / PLAN  INR assessment THER    Recheck INR In: 6 WEEKS    INR Location Clinic      Anticoagulation Summary  As of 4/10/2020    INR goal:   2.0-3.0   TTR:   73.9 % (1 y)   INR used for dosin.30 (2020)   Warfarin maintenance plan:   10 mg (5 mg x 2) every Thu; 7.5 mg (5 mg x 1.5) all other days   Full warfarin instructions:   10 mg every Thu; 7.5 mg all other days   Weekly warfarin total:   55 mg   No change documented:   Franchesca Jarvis RN   Plan last modified:   Franchesca Jarvis RN (2018)   Next INR check:   2020   Priority:   Maintenance   Target end date:   Indefinite    Indications    Chronic atrial fibrillation [I48.20]  Long-term (current) use of anticoagulants [Z79.01] [Z79.01]             Anticoagulation Episode Summary     INR check location:       Preferred lab:        Send INR reminders to:   CHAPO IZQUIERDO    Comments:         Anticoagulation Care Providers     Provider Role Specialty Phone number    Pablo Benz MD Referring [facility] [phone]            See the Encounter Report to view Anticoagulation Flowsheet and Dosing Calendar (Go to Encounters tab in chart review, and find the Anticoagulation Therapy Visit)        Franchesca Jarvis RN

## 2020-06-01 DIAGNOSIS — Z79.01 LONG TERM CURRENT USE OF ANTICOAGULANT THERAPY: ICD-10-CM

## 2020-06-01 DIAGNOSIS — I48.20 CHRONIC ATRIAL FIBRILLATION (H): ICD-10-CM

## 2020-06-01 LAB — INR PPP: 3 (ref 0.86–1.14)

## 2020-06-01 PROCEDURE — 36416 COLLJ CAPILLARY BLOOD SPEC: CPT | Performed by: FAMILY MEDICINE

## 2020-06-01 PROCEDURE — 85610 PROTHROMBIN TIME: CPT | Performed by: FAMILY MEDICINE

## 2020-06-02 ENCOUNTER — ANTICOAGULATION THERAPY VISIT (OUTPATIENT)
Dept: NURSING | Facility: CLINIC | Age: 65
End: 2020-06-02
Payer: COMMERCIAL

## 2020-06-02 DIAGNOSIS — I48.20 CHRONIC ATRIAL FIBRILLATION (H): ICD-10-CM

## 2020-06-02 DIAGNOSIS — Z79.01 LONG TERM CURRENT USE OF ANTICOAGULANT THERAPY: ICD-10-CM

## 2020-06-02 PROCEDURE — 99207 ZZC NO CHARGE NURSE ONLY: CPT

## 2020-06-02 NOTE — PROGRESS NOTES
ANTICOAGULATION FOLLOW-UP CLINIC VISIT    Patient Name:  Cameron Mariano  Date:  6/2/2020  Contact Type:  Telephone    SUBJECTIVE:  Patient Findings     Comments:   Therapeutic. /The patient was assessed for diet, medication, and activity level changes, missed or extra doses, bruising or bleeding, with no problem findings. Continue same.          Clinical Outcomes     Comments:   Therapeutic. /The patient was assessed for diet, medication, and activity level changes, missed or extra doses, bruising or bleeding, with no problem findings. Continue same.             OBJECTIVE    Recent labs: (last 7 days)     06/01/20  1606   INR 3.00*       ASSESSMENT / PLAN  INR assessment THER    Recheck INR In: 6 WEEKS    INR Location Clinic      Anticoagulation Summary  As of 6/2/2020    INR goal:   2.0-3.0   TTR:   76.3 % (1 y)   INR used for dosing:   3.00 (6/1/2020)   Warfarin maintenance plan:   10 mg (5 mg x 2) every Thu; 7.5 mg (5 mg x 1.5) all other days   Full warfarin instructions:   10 mg every Thu; 7.5 mg all other days   Weekly warfarin total:   55 mg   No change documented:   Franchesca Jarvis RN   Plan last modified:   Franchesca Jarvis RN (5/31/2018)   Next INR check:   7/13/2020   Priority:   Maintenance   Target end date:   Indefinite    Indications    Chronic atrial fibrillation [I48.20]  Long-term (current) use of anticoagulants [Z79.01] [Z79.01]             Anticoagulation Episode Summary     INR check location:       Preferred lab:       Send INR reminders to:   CHAPO IZQUIERDO    Comments:         Anticoagulation Care Providers     Provider Role Specialty Phone number    Pablo Benz MD Referring [facility] [phone]            See the Encounter Report to view Anticoagulation Flowsheet and Dosing Calendar (Go to Encounters tab in chart review, and find the Anticoagulation Therapy Visit)    Dosage adjustment made based on physician directed care plan.    Franchesca Jarvis RN

## 2020-07-01 DIAGNOSIS — I10 HYPERTENSION GOAL BP (BLOOD PRESSURE) < 140/90: ICD-10-CM

## 2020-07-01 NOTE — LETTER
July 3, 2020    Cameron Mariano  [address] [facility] [address]    Dear Cameron,       We recently received a refill request for lisinopril and cartia.  We have refilled this for a one time 30 day supply only because you are due for a:    Medication check office visit      Please call at your earliest convenience so that there will not be a delay with your future refills.          Thank you,   Your [facility] Team/  [phone]

## 2020-07-02 NOTE — TELEPHONE ENCOUNTER
Routing refill request to provider for review/approval because:  Patient needs to be seen because it has been more than 1 year since last office visit.  Breanna Ayala BSN, RN

## 2020-07-03 RX ORDER — DILTIAZEM HYDROCHLORIDE 240 MG/1
CAPSULE, EXTENDED RELEASE ORAL
Qty: 90 CAPSULE | Refills: 0 | Status: SHIPPED | OUTPATIENT
Start: 2020-07-03 | End: 2020-10-14

## 2020-07-03 RX ORDER — LISINOPRIL 2.5 MG/1
TABLET ORAL
Qty: 30 TABLET | Refills: 0 | Status: SHIPPED | OUTPATIENT
Start: 2020-07-03 | End: 2020-08-20

## 2020-07-13 DIAGNOSIS — I48.20 CHRONIC ATRIAL FIBRILLATION (H): ICD-10-CM

## 2020-07-13 DIAGNOSIS — Z79.01 LONG TERM CURRENT USE OF ANTICOAGULANT THERAPY: ICD-10-CM

## 2020-07-13 LAB
CAPILLARY BLOOD COLLECTION: NORMAL
INR PPP: 2.9 (ref 0.86–1.14)

## 2020-07-13 PROCEDURE — 85610 PROTHROMBIN TIME: CPT | Performed by: FAMILY MEDICINE

## 2020-07-13 PROCEDURE — 36416 COLLJ CAPILLARY BLOOD SPEC: CPT | Performed by: FAMILY MEDICINE

## 2020-07-14 ENCOUNTER — ANTICOAGULATION THERAPY VISIT (OUTPATIENT)
Dept: NURSING | Facility: CLINIC | Age: 65
End: 2020-07-14
Payer: COMMERCIAL

## 2020-07-14 ENCOUNTER — ANTICOAGULATION THERAPY VISIT (OUTPATIENT)
Dept: NURSING | Facility: CLINIC | Age: 65
End: 2020-07-14

## 2020-07-14 DIAGNOSIS — I48.20 CHRONIC ATRIAL FIBRILLATION (H): ICD-10-CM

## 2020-07-14 DIAGNOSIS — Z79.01 LONG TERM CURRENT USE OF ANTICOAGULANT THERAPY: ICD-10-CM

## 2020-07-14 PROCEDURE — 99207 ZZC NO CHARGE NURSE ONLY: CPT

## 2020-07-14 NOTE — PROGRESS NOTES
ANTICOAGULATION FOLLOW-UP CLINIC VISIT    Patient Name:  Cameron Mariano  Date:  2020  Contact Type:  Telephone    SUBJECTIVE:  Patient Findings     Comments:   Therapeutic. Message left for patient to call back with any changes or concerns and if none to continue same dose.         Clinical Outcomes     Comments:   Therapeutic. Message left for patient to call back with any changes or concerns and if none to continue same dose.            OBJECTIVE    Recent labs: (last 7 days)     20  1606   INR 2.90*       ASSESSMENT / PLAN  INR assessment THER    Recheck INR In: 6 WEEKS    INR Location Clinic      Anticoagulation Summary  As of 2020    INR goal:   2.0-3.0   TTR:   80.7 % (1 y)   INR used for dosin.90 (2020)   Warfarin maintenance plan:   10 mg (5 mg x 2) every Thu; 7.5 mg (5 mg x 1.5) all other days   Full warfarin instructions:   10 mg every Thu; 7.5 mg all other days   Weekly warfarin total:   55 mg   No change documented:   Franchesca Jarvis RN   Plan last modified:   Franchesca Jarvis RN (2018)   Next INR check:   2020   Priority:   Maintenance   Target end date:   Indefinite    Indications    Chronic atrial fibrillation (H) [I48.20]  Long-term (current) use of anticoagulants [Z79.01] [Z79.01]             Anticoagulation Episode Summary     INR check location:       Preferred lab:       Send INR reminders to:   CHAPO IZQUIERDO    Comments:         Anticoagulation Care Providers     Provider Role Specialty Phone number    Pablo Benz MD Referring [facility] [phone]            See the Encounter Report to view Anticoagulation Flowsheet and Dosing Calendar (Go to Encounters tab in chart review, and find the Anticoagulation Therapy Visit)    Dosage adjustment made based on physician directed care plan.    Franchesca Jarvis RN

## 2020-08-20 ENCOUNTER — TELEPHONE (OUTPATIENT)
Dept: FAMILY MEDICINE | Facility: CLINIC | Age: 65
End: 2020-08-20

## 2020-08-20 DIAGNOSIS — I10 HYPERTENSION GOAL BP (BLOOD PRESSURE) < 140/90: ICD-10-CM

## 2020-08-20 RX ORDER — LISINOPRIL 2.5 MG/1
2.5 TABLET ORAL DAILY
Qty: 30 TABLET | Refills: 0 | Status: SHIPPED | OUTPATIENT
Start: 2020-08-20 | End: 2020-09-01

## 2020-08-20 NOTE — TELEPHONE ENCOUNTER
The patient states that he is out of his lisinopril (ZESTRIL) 2.5 MG tablet.  He is requesting a partial refill to last until his upcoming appointment with Dr. Benz on 9/1/20.  [facility] pharmacy in [place].  Please advise.  Thank you.  Raven Govea,

## 2020-08-20 NOTE — TELEPHONE ENCOUNTER
Pt notified refill sent to pharmacy.  Patient has upcoming/ pending appointment:   Next 5 appointments (look out 90 days)    Sep 01, 2020  5:20 PM CDT  SHORT with Pablo [facility]z MD  [facility] [address] [facility] [place] [address]  [phone]          Rx refilled per [facility] refill protocol.    Breanna Ayala BSN, RN

## 2020-08-24 ENCOUNTER — ANTICOAGULATION THERAPY VISIT (OUTPATIENT)
Dept: NURSING | Facility: CLINIC | Age: 65
End: 2020-08-24

## 2020-08-24 DIAGNOSIS — Z79.01 LONG TERM CURRENT USE OF ANTICOAGULANT THERAPY: ICD-10-CM

## 2020-08-24 DIAGNOSIS — I48.20 CHRONIC ATRIAL FIBRILLATION (H): ICD-10-CM

## 2020-08-24 LAB
CAPILLARY BLOOD COLLECTION: NORMAL
INR PPP: 1.9 (ref 0.86–1.14)

## 2020-08-24 PROCEDURE — 36416 COLLJ CAPILLARY BLOOD SPEC: CPT | Performed by: FAMILY MEDICINE

## 2020-08-24 PROCEDURE — 99207 ZZC NO CHARGE NURSE ONLY: CPT

## 2020-08-24 PROCEDURE — 85610 PROTHROMBIN TIME: CPT | Performed by: FAMILY MEDICINE

## 2020-08-24 NOTE — PROGRESS NOTES
ANTICOAGULATION FOLLOW-UP CLINIC VISIT    Patient Name:  Cameron Mariano  Date:  2020  Contact Type:  Telephone    SUBJECTIVE:  Patient Findings     Positives:   Change in diet/appetite    Comments:   Ate more salad than usual in past week.        Clinical Outcomes     Negatives:   Major bleeding event, Thromboembolic event, Anticoagulation-related hospital admission, Anticoagulation-related ED visit, Anticoagulation-related fatality    Comments:   Ate more salad than usual in past week.           OBJECTIVE    Recent labs: (last 7 days)     20  0709   INR 1.90*       ASSESSMENT / PLAN  INR assessment SUB    Recheck INR In: 5 WEEKS    INR Location Clinic      Anticoagulation Summary  As of 2020    INR goal:   2.0-3.0   TTR:   82.6 % (1 y)   INR used for dosin.90! (2020)   Warfarin maintenance plan:   10 mg (5 mg x 2) every Thu; 7.5 mg (5 mg x 1.5) all other days   Full warfarin instructions:   10 mg every Thu; 7.5 mg all other days   Weekly warfarin total:   55 mg   No change documented:   Franchesca Jarvis RN   Plan last modified:   Franchesca Jarvis RN (2018)   Next INR check:   2020   Priority:   Maintenance   Target end date:   Indefinite    Indications    Chronic atrial fibrillation (H) [I48.20]  Long-term (current) use of anticoagulants [Z79.01] [Z79.01]             Anticoagulation Episode Summary     INR check location:       Preferred lab:       Send INR reminders to:   CHAPO IZQUIERDO    Comments:         Anticoagulation Care Providers     Provider Role Specialty Phone number    Pablo Benz MD Referring [facility] [phone]            See the Encounter Report to view Anticoagulation Flowsheet and Dosing Calendar (Go to Encounters tab in chart review, and find the Anticoagulation Therapy Visit)    Dosage adjustment made based on physician directed care plan.    Franchesca Jarvis RN

## 2020-09-01 ENCOUNTER — OFFICE VISIT (OUTPATIENT)
Dept: FAMILY MEDICINE | Facility: CLINIC | Age: 65
End: 2020-09-01
Payer: COMMERCIAL

## 2020-09-01 VITALS
SYSTOLIC BLOOD PRESSURE: 110 MMHG | HEART RATE: 83 BPM | RESPIRATION RATE: 16 BRPM | WEIGHT: 190 LBS | TEMPERATURE: 97.6 F | DIASTOLIC BLOOD PRESSURE: 80 MMHG | OXYGEN SATURATION: 98 % | BODY MASS INDEX: 27.26 KG/M2

## 2020-09-01 DIAGNOSIS — I48.20 CHRONIC ATRIAL FIBRILLATION (H): ICD-10-CM

## 2020-09-01 DIAGNOSIS — Z12.5 SCREENING PSA (PROSTATE SPECIFIC ANTIGEN): ICD-10-CM

## 2020-09-01 DIAGNOSIS — E78.5 HYPERLIPIDEMIA LDL GOAL <130: Primary | ICD-10-CM

## 2020-09-01 DIAGNOSIS — I10 HYPERTENSION GOAL BP (BLOOD PRESSURE) < 140/90: ICD-10-CM

## 2020-09-01 DIAGNOSIS — I42.9 SECONDARY CARDIOMYOPATHY (H): ICD-10-CM

## 2020-09-01 LAB
ALBUMIN SERPL-MCNC: 3.8 G/DL (ref 3.4–5)
ALP SERPL-CCNC: 124 U/L (ref 40–150)
ALT SERPL W P-5'-P-CCNC: 31 U/L (ref 0–70)
ANION GAP SERPL CALCULATED.3IONS-SCNC: 7 MMOL/L (ref 3–14)
AST SERPL W P-5'-P-CCNC: 22 U/L (ref 0–45)
BILIRUB SERPL-MCNC: 0.3 MG/DL (ref 0.2–1.3)
BUN SERPL-MCNC: 15 MG/DL (ref 7–30)
CALCIUM SERPL-MCNC: 8.6 MG/DL (ref 8.5–10.1)
CHLORIDE SERPL-SCNC: 107 MMOL/L (ref 94–109)
CO2 SERPL-SCNC: 26 MMOL/L (ref 20–32)
CREAT SERPL-MCNC: 0.73 MG/DL (ref 0.66–1.25)
ERYTHROCYTE [DISTWIDTH] IN BLOOD BY AUTOMATED COUNT: 14.3 % (ref 10–15)
GFR SERPL CREATININE-BSD FRML MDRD: >90 ML/MIN/{1.73_M2}
GLUCOSE SERPL-MCNC: 102 MG/DL (ref 70–99)
HCT VFR BLD AUTO: 41.3 % (ref 40–53)
HGB BLD-MCNC: 14 G/DL (ref 13.3–17.7)
MCH RBC QN AUTO: 32.2 PG (ref 26.5–33)
MCHC RBC AUTO-ENTMCNC: 33.9 G/DL (ref 31.5–36.5)
MCV RBC AUTO: 95 FL (ref 78–100)
PLATELET # BLD AUTO: 181 10E9/L (ref 150–450)
POTASSIUM SERPL-SCNC: 4 MMOL/L (ref 3.4–5.3)
PROT SERPL-MCNC: 7.4 G/DL (ref 6.8–8.8)
RBC # BLD AUTO: 4.35 10E12/L (ref 4.4–5.9)
SODIUM SERPL-SCNC: 140 MMOL/L (ref 133–144)
WBC # BLD AUTO: 10 10E9/L (ref 4–11)

## 2020-09-01 PROCEDURE — 85027 COMPLETE CBC AUTOMATED: CPT | Performed by: FAMILY MEDICINE

## 2020-09-01 PROCEDURE — 36415 COLL VENOUS BLD VENIPUNCTURE: CPT | Performed by: FAMILY MEDICINE

## 2020-09-01 PROCEDURE — 99214 OFFICE O/P EST MOD 30 MIN: CPT | Performed by: FAMILY MEDICINE

## 2020-09-01 PROCEDURE — 80053 COMPREHEN METABOLIC PANEL: CPT | Performed by: FAMILY MEDICINE

## 2020-09-01 PROCEDURE — G0103 PSA SCREENING: HCPCS | Performed by: FAMILY MEDICINE

## 2020-09-01 RX ORDER — METOPROLOL SUCCINATE 100 MG/1
100 TABLET, EXTENDED RELEASE ORAL DAILY
Qty: 90 TABLET | Refills: 1 | Status: SHIPPED | OUTPATIENT
Start: 2020-09-01 | End: 2021-01-18

## 2020-09-01 RX ORDER — LISINOPRIL 2.5 MG/1
2.5 TABLET ORAL DAILY
Qty: 90 TABLET | Refills: 1 | Status: SHIPPED | OUTPATIENT
Start: 2020-09-01 | End: 2021-02-15

## 2020-09-01 NOTE — PROGRESS NOTES
SUBJECTIVE:  Cameron Mariano, a 65 year old male scheduled an appointment to discuss the following issues:  Follow-up htn, high cholesterol, cardiolmyopathy, ed and a.fib. some weight gain. Exercise - active at work and home.   No chest pain or shortness of breath. No nausea, vomiting or diarrhea or black/bloody stools. Normal colonoscopy last year. No urine changes or hematuria. Urine flow ok and nocturia x1. Last cardiology a couple years.   Medical, social, surgical, and family histories reviewed.    ROS:  All other ROS negative.     OBJECTIVE:  /80   Pulse 83   Temp 97.6  F (36.4  C) (Tympanic)   Resp 16   Wt 86.2 kg (190 lb)   SpO2 98%   BMI 27.26 kg/m    EXAM:  GENERAL APPEARANCE: healthy, alert and no distress  EYES: EOMI,  PERRL  HENT: ear canals and TM's normal and nose and mouth without ulcers or lesions  NECK: no adenopathy, no asymmetry, masses, or scars and thyroid normal to palpation  RESP: lungs clear to auscultation - no rales, rhonchi or wheezes  CV: regular rates and rhythm, normal S1 S2, no S3 or S4 and no murmur, click or rub -  ABDOMEN:  soft, nontender, no HSM or masses and bowel sounds normal  MS: extremities normal- no gross deformities noted, no evidence of inflammation in joints, FROM in all extremities.  NEURO: Normal strength and tone, sensory exam grossly normal, mentation intact and speech normal  PSYCH: mentation appears normal and affect normal/bright    ASSESSMENT / PLAN:  (E78.5) Hyperlipidemia LDL goal <130  (primary encounter diagnosis)  Comment: stable in past   Plan: Comprehensive metabolic panel (BMP + Alb, Alk         Phos, ALT, AST, Total. Bili, TP)        5-10 lbs weight loss. Continue lipitor.     (I10) Hypertension goal BP (blood pressure) < 140/90  Comment: stable  Plan: Comprehensive metabolic panel (BMP + Alb, Alk         Phos, ALT, AST, Total. Bili, TP), lisinopril         (ZESTRIL) 2.5 MG tablet, metoprolol succinate         ER (TOPROL-XL) 100 MG 24 hr  tablet        Exercise and weight loss. Chest pain or shortness of breath to er. Recheck in 6 months  Sooner if worse.    (I48.20) Chronic atrial fibrillation (H)  Comment: stable  Plan: CBC with platelets, CARDIOLOGY EVAL ADULT         REFERRAL        Overdue for cardiology appointment. Continue coumadin per INR RN.     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     (Z12.5) Screening PSA (prostate specific antigen)  Plan: PSA, screen          Pablo Benz MD

## 2020-09-02 LAB — PSA SERPL-ACNC: 0.92 UG/L (ref 0–4)

## 2020-09-03 ENCOUNTER — TELEPHONE (OUTPATIENT)
Dept: CARDIOLOGY | Facility: CLINIC | Age: 65
End: 2020-09-03

## 2020-09-03 NOTE — TELEPHONE ENCOUNTER
Cardiology referral:    Chronic atrial fibrillation (H)    Secondary cardiomyopathy (H)      Left message for patient to call back to schedule appointment.     PIERRE Hilario

## 2020-09-08 DIAGNOSIS — I48.20 CHRONIC ATRIAL FIBRILLATION (H): ICD-10-CM

## 2020-09-08 DIAGNOSIS — Z79.01 LONG TERM CURRENT USE OF ANTICOAGULANT THERAPY: ICD-10-CM

## 2020-09-09 RX ORDER — WARFARIN SODIUM 5 MG/1
TABLET ORAL
Qty: 138 TABLET | Refills: 0 | Status: SHIPPED | OUTPATIENT
Start: 2020-09-09 | End: 2020-12-18

## 2020-09-29 ENCOUNTER — ANTICOAGULATION THERAPY VISIT (OUTPATIENT)
Dept: NURSING | Facility: CLINIC | Age: 65
End: 2020-09-29

## 2020-09-29 DIAGNOSIS — I48.20 CHRONIC ATRIAL FIBRILLATION (H): ICD-10-CM

## 2020-09-29 DIAGNOSIS — Z79.01 LONG TERM CURRENT USE OF ANTICOAGULANT THERAPY: ICD-10-CM

## 2020-09-29 LAB
CAPILLARY BLOOD COLLECTION: NORMAL
INR PPP: 2.3 (ref 0.86–1.14)

## 2020-09-29 PROCEDURE — 36416 COLLJ CAPILLARY BLOOD SPEC: CPT | Performed by: FAMILY MEDICINE

## 2020-09-29 PROCEDURE — 85610 PROTHROMBIN TIME: CPT | Performed by: FAMILY MEDICINE

## 2020-09-29 PROCEDURE — 99207 ZZC NO CHARGE NURSE ONLY: CPT

## 2020-09-29 NOTE — PROGRESS NOTES
ANTICOAGULATION FOLLOW-UP CLINIC VISIT    Patient Name:  Cameron Mariano  Date:  2020  Contact Type:  Telephone    SUBJECTIVE:  Patient Findings     Comments:   The patient was assessed for diet, medication, and activity level changes, missed or extra doses, bruising or bleeding, with no problem findings.          Clinical Outcomes     Negatives:   Major bleeding event, Thromboembolic event, Anticoagulation-related hospital admission, Anticoagulation-related ED visit, Anticoagulation-related fatality    Comments:   The patient was assessed for diet, medication, and activity level changes, missed or extra doses, bruising or bleeding, with no problem findings.             OBJECTIVE    Recent labs: (last 7 days)     20  0711   INR 2.30*       ASSESSMENT / PLAN  INR assessment THER    Recheck INR In: 6 WEEKS    INR Location Clinic      Anticoagulation Summary  As of 2020    INR goal:   2.0-3.0   TTR:   86.1 % (1 y)   INR used for dosin.30 (2020)   Warfarin maintenance plan:   10 mg (5 mg x 2) every Thu; 7.5 mg (5 mg x 1.5) all other days   Full warfarin instructions:   10 mg every Thu; 7.5 mg all other days   Weekly warfarin total:   55 mg   No change documented:   Franchesca Jarvis RN   Plan last modified:   Franchesca Jarvis RN (2018)   Next INR check:   2020   Priority:   Maintenance   Target end date:   Indefinite    Indications    Chronic atrial fibrillation (H) [I48.20]  Long-term (current) use of anticoagulants [Z79.01] [Z79.01]             Anticoagulation Episode Summary     INR check location:       Preferred lab:       Send INR reminders to:   CHAPO IZQUIERDO    Comments:         Anticoagulation Care Providers     Provider Role Specialty Phone number    Pablo Benz MD Referring [facility] [phone]            See the Encounter Report to view Anticoagulation Flowsheet and Dosing Calendar (Go to Encounters tab in chart review, and find the Anticoagulation  Therapy Visit)    Dosage adjustment made based on physician directed care plan.    Franchesca Jarvis RN

## 2020-10-13 DIAGNOSIS — I10 HYPERTENSION GOAL BP (BLOOD PRESSURE) < 140/90: ICD-10-CM

## 2020-10-14 RX ORDER — DILTIAZEM HYDROCHLORIDE 240 MG/1
CAPSULE, EXTENDED RELEASE ORAL
Qty: 90 CAPSULE | Refills: 0 | Status: SHIPPED | OUTPATIENT
Start: 2020-10-14 | End: 2021-01-14

## 2020-10-14 NOTE — TELEPHONE ENCOUNTER
Prescription approved per [facility] Refill Protocol. Franchesca Jarvis RN

## 2020-10-22 ENCOUNTER — TELEPHONE (OUTPATIENT)
Dept: CARDIOLOGY | Facility: CLINIC | Age: 65
End: 2020-10-22

## 2020-10-22 DIAGNOSIS — I48.20 CHRONIC ATRIAL FIBRILLATION (H): ICD-10-CM

## 2020-10-22 DIAGNOSIS — I42.9 SECONDARY CARDIOMYOPATHY (H): Primary | ICD-10-CM

## 2020-10-23 NOTE — TELEPHONE ENCOUNTER
[facility] Call Center    Phone Message    May a detailed message be left on voicemail: yes     Reason for Call: Other: The soonest echo would be on 10/29, which is after the appointment with . Please call pt back to discuss if that is ok or if the appointment needs to be rescheduled. Echo not scheduled yet.     Action Taken: Message routed to:  [facility] ([facility]    Travel Screening: Not Applicable

## 2020-10-23 NOTE — TELEPHONE ENCOUNTER
Patient is scheduled for a consultation on 10/26 with Dr. Calabrese,  Hx of cardiomyopathy and afib.  Please try to schedule him for an echo this Friday 10/23 if the 3 pm is still open.  Echo order is placed.    Tina Bolanos RN  Cardiology Care Coordinator  [facility]  [phone] option 1

## 2020-10-26 ENCOUNTER — OFFICE VISIT (OUTPATIENT)
Dept: CARDIOLOGY | Facility: CLINIC | Age: 65
End: 2020-10-26
Payer: COMMERCIAL

## 2020-10-26 VITALS
BODY MASS INDEX: 27.55 KG/M2 | HEART RATE: 84 BPM | OXYGEN SATURATION: 97 % | SYSTOLIC BLOOD PRESSURE: 122 MMHG | DIASTOLIC BLOOD PRESSURE: 82 MMHG | WEIGHT: 192 LBS

## 2020-10-26 DIAGNOSIS — I48.11 LONGSTANDING PERSISTENT ATRIAL FIBRILLATION (H): Primary | ICD-10-CM

## 2020-10-26 DIAGNOSIS — I48.20 CHRONIC ATRIAL FIBRILLATION (H): ICD-10-CM

## 2020-10-26 DIAGNOSIS — I42.9 SECONDARY CARDIOMYOPATHY (H): ICD-10-CM

## 2020-10-26 PROCEDURE — 93000 ELECTROCARDIOGRAM COMPLETE: CPT | Performed by: INTERNAL MEDICINE

## 2020-10-26 PROCEDURE — 99204 OFFICE O/P NEW MOD 45 MIN: CPT | Mod: GC | Performed by: INTERNAL MEDICINE

## 2020-10-26 NOTE — TELEPHONE ENCOUNTER
Spoke to patient-plan is to keep appt today and schedule echo after he sees Dr. Calabrese.  Tina Bolanos RN

## 2020-10-26 NOTE — PROGRESS NOTES
Electrophysiology Clinic Note  10/26/2020    HPI:  Mr Mariano is a 65 year old gentleman who presents as a new referral to the EP clinic for persistent Afib.     The patient was first diagnosed with atrial fibrillation in 2007 when he was admitted to [facility] with new onset congestive heart failure and found to have cardiomyopathy with LVEF of 25-30% range. A nuclear perfusion study & TTE done at the time were consistent with a nonischemic dilated cardiomyopathy. Presumably his cardiomyopathy was tachycardia mediated with restoration of sinus rhythm and appropriate medical therapy. In 2008, his LV function normalized to 55-60%; he was noted to have bi-atrial enlargement; but no significant valvular abnormalities.     He was in sinus rhythm for more than a year until he was found to be in atrial fibrillation again in September 2009 and required DCCV. Since then he has had two more cardioversions, last in February 2011 while on Sotalol therapy. NSR only lasted a few days and he was found to be in atrial fibrillation soon after on follow up. Thereafter, a rate control strategy was pursued. He has been on Toprol  mg once daily & Cardizem 240 mg once daily, along with warfarin for anti-coagulation. Most recent TTE in Sept 2009 revealed LVEF 50-55%.    During the visit today, the patient reported being asymptomatic from an Afib perspective. He denied palpitations, presyncope, syncope, shortness-of-breath or dyspnea on exertion. 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  Denied chest tightness, PND, orthopnea or pedal edema.       ROS:  A complete 10-point ROS was negative except as above.    PAST MEDICAL HISTORY:  Past Medical History:   Diagnosis Date     Adhesive capsulitis of left shoulder 4/13/2019     Atrial fibrillation (H)      History of alcohol abuse      Hypertension      MEDICAL HISTORY OF -     cardioversion X 2     Other primary cardiomyopathies      Cardiomyopathy     Peyronie's disease        PAST SURGICAL HISTORY:  Past Surgical History:   Procedure Laterality Date     APPENDECTOMY         FAMILY HISTORY:  Family History   Problem Relation Age of Onset     Blood Disease Mother         blood clots     Cancer Father         stomach     Alcohol/Drug Father         smoker     Heart Disease Brother        SOCIAL HISTORY:  Social History     Socioeconomic History     Marital status:      Spouse name: None     Number of children: None     Years of education: None     Highest education level: None   Occupational History     None   Social Needs     Financial resource strain: None     Food insecurity     Worry: None     Inability: None     Transportation needs     Medical: None     Non-medical: None   Tobacco Use     Smoking status: Never Smoker     Smokeless tobacco: Never Used   Substance and Sexual Activity     Alcohol use: Yes     Comment: 3-4 beers a week     Drug use: No     Sexual activity: Yes     Partners: Female   Lifestyle     Physical activity     Days per week: None     Minutes per session: None     Stress: None   Relationships     Social connections     Talks on phone: None     Gets together: None     Attends Quaker service: None     Active member of club or organization: None     Attends meetings of clubs or organizations: None     Relationship status: None     Intimate partner violence     Fear of current or ex partner: None     Emotionally abused: None     Physically abused: None     Forced sexual activity: None   Other Topics Concern     Parent/sibling w/ CABG, MI or angioplasty before 65F 55M? Not Asked   Social History Narrative     None       MEDICATIONS:  Current Outpatient Medications   Medication     aspirin 81 MG tablet     atorvastatin (LIPITOR) 40 MG tablet     CARTIA  MG 24 hr capsule     Cholecalciferol (VITAMIN D) 400 UNITS capsule     fish oil-omega-3 fatty acids (FISH OIL) 1000 MG capsule     lisinopril (ZESTRIL) 2.5 MG  tablet     MULTI-VITAMIN OR TABS     sildenafil (REVATIO) 20 MG tablet     warfarin ANTICOAGULANT (COUMADIN) 5 MG tablet     metoprolol succinate ER (TOPROL-XL) 100 MG 24 hr tablet     No current facility-administered medications for this visit.        ALLERGIES:     Allergies   Allergen Reactions     Aspartame        PHYSICAL EXAM:  /82 (BP Location: Left arm, Patient Position: Chair, Cuff Size: Adult Regular)   Pulse 84   Wt 87.1 kg (192 lb)   SpO2 97%   BMI 27.55 kg/m    Gen: alert, interactive, NAD  HEENT: atraumatic, EOMI, MMM  Neck: supple, no JVD  CV: irregular, no m/r/g  Chest: CTAB, no wheezes or crackles  Abd: soft, NT, ND, +BS  Ext: no LE edema, 2+ peripheral pulses  Skin: warm and dry, no rashes on exposed surfaces  Neuro: alert and oriented, no focal deficits    LABS:    CMP  Last Comprehensive Metabolic Panel:  Sodium   Date Value Ref Range Status   09/01/2020 140 133 - 144 mmol/L Final     Potassium   Date Value Ref Range Status   09/01/2020 4.0 3.4 - 5.3 mmol/L Final     Chloride   Date Value Ref Range Status   09/01/2020 107 94 - 109 mmol/L Final     Carbon Dioxide   Date Value Ref Range Status   09/01/2020 26 20 - 32 mmol/L Final     Anion Gap   Date Value Ref Range Status   09/01/2020 7 3 - 14 mmol/L Final     Glucose   Date Value Ref Range Status   09/01/2020 102 (H) 70 - 99 mg/dL Final     Comment:     Non Fasting     Urea Nitrogen   Date Value Ref Range Status   09/01/2020 15 7 - 30 mg/dL Final     Creatinine   Date Value Ref Range Status   09/01/2020 0.73 0.66 - 1.25 mg/dL Final     GFR Estimate   Date Value Ref Range Status   09/01/2020 >90 >60 mL/min/[1.73_m2] Final     Comment:     Non  GFR Calc  Starting 12/18/2018, serum creatinine based estimated GFR (eGFR) will be   calculated using the Chronic Kidney Disease Epidemiology Collaboration   (CKD-EPI) equation.       Calcium   Date Value Ref Range Status   09/01/2020 8.6 8.5 - 10.1 mg/dL Final     Bilirubin Total    Date Value Ref Range Status   09/01/2020 0.3 0.2 - 1.3 mg/dL Final     Alkaline Phosphatase   Date Value Ref Range Status   09/01/2020 124 40 - 150 U/L Final     ALT   Date Value Ref Range Status   09/01/2020 31 0 - 70 U/L Final     AST   Date Value Ref Range Status   09/01/2020 22 0 - 45 U/L Final       CBC  CBC RESULTS:   Recent Labs   Lab Test 09/01/20  1726   WBC 10.0   RBC 4.35*   HGB 14.0   HCT 41.3   MCV 95   MCH 32.2   MCHC 33.9   RDW 14.3          TROP  No results found for: TROPI, TROPONIN, TROPR, TROPN    LIPIDS  Recent Labs   Lab Test 05/08/19  0704 06/11/17  1000 11/03/14  0751 11/03/14  0751 10/02/13  1625   CHOL 222* 154   < > 229* 185   HDL 69 60   < > 63 50   * 85   < > 145* 120   TRIG 92 43   < > 103 73   CHOLHDLRATIO  --   --   --  3.6 3.7    < > = values in this interval not displayed.       TSH  TSH   Date Value Ref Range Status   04/29/2011 1.39 0.4 - 5.0 mU/L Final     CARDIAC DATA:  EKG 10/26/2020: Rate controlled Afib.   TTE Sept 2010:    Left ventricular cavity size normal. Borderline normal left ventricular   ejection fraction estimated at 50-55%. No obvious regional wall motion abnormalities. Mild LVH.   Bi-atrial enlargement   No significant valvular heart disease noted.   Mild pulmonary hypertension with an estimated RV systolic pressure of 29   mmHg + RA pressure.    ASSESSMENT/PLAN:  Mr Mariano is a 65 year old gentleman who presents as a new referral to the EP clinic for persistent Afib.      The patient seems to be doing well from a symptomatic perspective on the current regimen. EKG in office consistent with rate controlled Afib. He has not had any cardiac imaging in nearly a decade to assess the impact of Afib on LV function.    1. Continue Toprol  mg once daily & Cardizem 240 mg once daily for rate control.  2. Continue Coumadin for AC; will discuss switching over to NOAC in the future. CHADS VASc 1 (HTN)  3. Check TTE for LV function and ZioPatch to assess  average ventricular rate and Afib burden.  4. Follow up in clinic in 4 weeks time.    Pt seen and discussed with Dr. Calabrese.    Alana Cheung MD  Cardiology Fellow, PGY-4  [phone]      I have seen, interviewed, and examined patient. I have reviewed the laboratory tests, imaging, and other investigations. I have reviewed the management plan with the patient. I discussed with the team and agree with the findings and plan in this resident/fellow/nurse practitioner's note. In addition, changes in the physical examination, assessment and plan have been incorporated into the note by myself, as to make it a single cohesive document.       Tami Calabrese MD, MS  Cardiology/Cardiac EP Attending Staff

## 2020-10-26 NOTE — PATIENT INSTRUCTIONS
Thank you for coming to the [facility] @ [place]; please note the following instructions:    1. Dr. Calabrese has ordered for you to have an echo and a 14 day heart monitor.        If you have any questions regarding your visit please contact your care team:     Cardiology  Telephone Number   Tina VILLEGAS, RN  Olena RODRIGUEZ, RN   Alanna SULLIVAN, RMDURGA SHERMAN, RMA  Jeff SWAIN, LPN   [phone] (option 1)   For scheduling appts:     [phone] (select option 1)       For the Device Clinic (Pacemakers and ICD's)  RN's :  Ana Valentino   During business hours: [phone]    *After business hours:  [phone] (select option 4)      Normal test result notifications will be released via Thanx or mailed within 7 business days.  All other test results, will be communicated via telephone once reviewed by your cardiologist.    If you need a medication refill please contact your pharmacy.  Please allow 3 business days for your refill to be completed.    As always, thank you for trusting us with your health care needs!

## 2020-10-26 NOTE — LETTER
10/26/2020      RE: Cameron Mariano  [address]  [place] [address]       Dear Colleague,    Thank you for the opportunity to participate in the care of your patient, Cameron Mariano, at the [facility] [place] [facility]. Please see a copy of my visit note below.    Electrophysiology Clinic Note  10/26/2020    HPI:  Mr Mariano is a 65 year old gentleman who presents as a new referral to the EP clinic for persistent Afib.     The patient was first diagnosed with atrial fibrillation in 2007 when he was admitted to [facility] with new onset congestive heart failure and found to have cardiomyopathy with LVEF of 25-30% range. A nuclear perfusion study & TTE done at the time were consistent with a nonischemic dilated cardiomyopathy. Presumably his cardiomyopathy was tachycardia mediated with restoration of sinus rhythm and appropriate medical therapy. In 2008, his LV function normalized to 55-60%; he was noted to have bi-atrial enlargement; but no significant valvular abnormalities.     He was in sinus rhythm for more than a year until he was found to be in atrial fibrillation again in September 2009 and required DCCV. Since then he has had two more cardioversions, last in February 2011 while on Sotalol therapy. NSR only lasted a few days and he was found to be in atrial fibrillation soon after on follow up. Thereafter, a rate control strategy was pursued. He has been on Toprol  mg once daily & Cardizem 240 mg once daily, along with warfarin for anti-coagulation. Most recent TTE in Sept 2009 revealed LVEF 50-55%.    During the visit today, the patient reported being asymptomatic from an Afib perspective. He denied palpitations, presyncope, syncope, shortness-of-breath or dyspnea on exertion. 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  Denied chest tightness, PND, orthopnea or pedal edema.       ROS:  A complete  10-point ROS was negative except as above.    PAST MEDICAL HISTORY:  Past Medical History:   Diagnosis Date     Adhesive capsulitis of left shoulder 4/13/2019     Atrial fibrillation (H)      History of alcohol abuse      Hypertension      MEDICAL HISTORY OF -     cardioversion X 2     Other primary cardiomyopathies     Cardiomyopathy     Peyronie's disease        PAST SURGICAL HISTORY:  Past Surgical History:   Procedure Laterality Date     APPENDECTOMY         FAMILY HISTORY:  Family History   Problem Relation Age of Onset     Blood Disease Mother         blood clots     Cancer Father         stomach     Alcohol/Drug Father         smoker     Heart Disease Brother        SOCIAL HISTORY:  Social History     Socioeconomic History     Marital status:      Spouse name: None     Number of children: None     Years of education: None     Highest education level: None   Occupational History     None   Social Needs     Financial resource strain: None     Food insecurity     Worry: None     Inability: None     Transportation needs     Medical: None     Non-medical: None   Tobacco Use     Smoking status: Never Smoker     Smokeless tobacco: Never Used   Substance and Sexual Activity     Alcohol use: Yes     Comment: 3-4 beers a week     Drug use: No     Sexual activity: Yes     Partners: Female   Lifestyle     Physical activity     Days per week: None     Minutes per session: None     Stress: None   Relationships     Social connections     Talks on phone: None     Gets together: None     Attends Muslim service: None     Active member of club or organization: None     Attends meetings of clubs or organizations: None     Relationship status: None     Intimate partner violence     Fear of current or ex partner: None     Emotionally abused: None     Physically abused: None     Forced sexual activity: None   Other Topics Concern     Parent/sibling w/ CABG, MI or angioplasty before 65F 55M? Not Asked   Social History  Narrative     None       MEDICATIONS:  Current Outpatient Medications   Medication     aspirin 81 MG tablet     atorvastatin (LIPITOR) 40 MG tablet     CARTIA  MG 24 hr capsule     Cholecalciferol (VITAMIN D) 400 UNITS capsule     fish oil-omega-3 fatty acids (FISH OIL) 1000 MG capsule     lisinopril (ZESTRIL) 2.5 MG tablet     MULTI-VITAMIN OR TABS     sildenafil (REVATIO) 20 MG tablet     warfarin ANTICOAGULANT (COUMADIN) 5 MG tablet     metoprolol succinate ER (TOPROL-XL) 100 MG 24 hr tablet     No current facility-administered medications for this visit.        ALLERGIES:     Allergies   Allergen Reactions     Aspartame        PHYSICAL EXAM:  /82 (BP Location: Left arm, Patient Position: Chair, Cuff Size: Adult Regular)   Pulse 84   Wt 87.1 kg (192 lb)   SpO2 97%   BMI 27.55 kg/m    Gen: alert, interactive, NAD  HEENT: atraumatic, EOMI, MMM  Neck: supple, no JVD  CV: irregular, no m/r/g  Chest: CTAB, no wheezes or crackles  Abd: soft, NT, ND, +BS  Ext: no LE edema, 2+ peripheral pulses  Skin: warm and dry, no rashes on exposed surfaces  Neuro: alert and oriented, no focal deficits    LABS:    CMP  Last Comprehensive Metabolic Panel:  Sodium   Date Value Ref Range Status   09/01/2020 140 133 - 144 mmol/L Final     Potassium   Date Value Ref Range Status   09/01/2020 4.0 3.4 - 5.3 mmol/L Final     Chloride   Date Value Ref Range Status   09/01/2020 107 94 - 109 mmol/L Final     Carbon Dioxide   Date Value Ref Range Status   09/01/2020 26 20 - 32 mmol/L Final     Anion Gap   Date Value Ref Range Status   09/01/2020 7 3 - 14 mmol/L Final     Glucose   Date Value Ref Range Status   09/01/2020 102 (H) 70 - 99 mg/dL Final     Comment:     Non Fasting     Urea Nitrogen   Date Value Ref Range Status   09/01/2020 15 7 - 30 mg/dL Final     Creatinine   Date Value Ref Range Status   09/01/2020 0.73 0.66 - 1.25 mg/dL Final     GFR Estimate   Date Value Ref Range Status   09/01/2020 >90 >60 mL/min/[1.73_m2]  Final     Comment:     Non  GFR Calc  Starting 12/18/2018, serum creatinine based estimated GFR (eGFR) will be   calculated using the Chronic Kidney Disease Epidemiology Collaboration   (CKD-EPI) equation.       Calcium   Date Value Ref Range Status   09/01/2020 8.6 8.5 - 10.1 mg/dL Final     Bilirubin Total   Date Value Ref Range Status   09/01/2020 0.3 0.2 - 1.3 mg/dL Final     Alkaline Phosphatase   Date Value Ref Range Status   09/01/2020 124 40 - 150 U/L Final     ALT   Date Value Ref Range Status   09/01/2020 31 0 - 70 U/L Final     AST   Date Value Ref Range Status   09/01/2020 22 0 - 45 U/L Final       CBC  CBC RESULTS:   Recent Labs   Lab Test 09/01/20  1726   WBC 10.0   RBC 4.35*   HGB 14.0   HCT 41.3   MCV 95   MCH 32.2   MCHC 33.9   RDW 14.3          TROP  No results found for: TROPI, TROPONIN, TROPR, TROPN    LIPIDS  Recent Labs   Lab Test 05/08/19  0704 06/11/17  1000 11/03/14  0751 11/03/14  0751 10/02/13  1625   CHOL 222* 154   < > 229* 185   HDL 69 60   < > 63 50   * 85   < > 145* 120   TRIG 92 43   < > 103 73   CHOLHDLRATIO  --   --   --  3.6 3.7    < > = values in this interval not displayed.       TSH  TSH   Date Value Ref Range Status   04/29/2011 1.39 0.4 - 5.0 mU/L Final     CARDIAC DATA:  EKG 10/26/2020: Rate controlled Afib.   TTE Sept 2010:    Left ventricular cavity size normal. Borderline normal left ventricular   ejection fraction estimated at 50-55%. No obvious regional wall motion abnormalities. Mild LVH.   Bi-atrial enlargement   No significant valvular heart disease noted.   Mild pulmonary hypertension with an estimated RV systolic pressure of 29   mmHg + RA pressure.    ASSESSMENT/PLAN:  Mr Mariano is a 65 year old gentleman who presents as a new referral to the EP clinic for persistent Afib.      The patient seems to be doing well from a symptomatic perspective on the current regimen. EKG in office consistent with rate controlled Afib. He has not had any  cardiac imaging in nearly a decade to assess the impact of Afib on LV function.    1. Continue Toprol  mg once daily & Cardizem 240 mg once daily for rate control.  2. Continue Coumadin for AC; will discuss switching over to NOAC in the future. CHADS VASc 1 (HTN)  3. Check TTE for LV function and ZioPatch to assess average ventricular rate and Afib burden.  4. Follow up in clinic in 4 weeks time.    Pt seen and discussed with Dr. Calabrese.    Alana Cheung MD  Cardiology Fellow, PGY-4  [phone]      I have seen, interviewed, and examined patient. I have reviewed the laboratory tests, imaging, and other investigations. I have reviewed the management plan with the patient. I discussed with the team and agree with the findings and plan in this resident/fellow/nurse practitioner's note. In addition, changes in the physical examination, assessment and plan have been incorporated into the note by myself, as to make it a single cohesive document.       Tami Calabrese MD, MS  Cardiology/Cardiac EP Attending Staff      Please do not hesitate to contact me if you have any questions/concerns.     Sincerely,     Tami Calabrese MD

## 2020-10-26 NOTE — NURSING NOTE
"Chief Complaint   Patient presents with     Atrial Fib     New: Chronic Afib. Pt reports daily chest tightness.       Initial /82 (BP Location: Left arm, Patient Position: Chair, Cuff Size: Adult Regular)   Pulse 84   Wt 87.1 kg (192 lb)   SpO2 97%   BMI 27.55 kg/m   Estimated body mass index is 27.55 kg/m  as calculated from the following:    Height as of 5/31/19: 1.778 m (5' 10\").    Weight as of this encounter: 87.1 kg (192 lb)..  BP completed using cuff size: regular    Sindy White MA  "

## 2020-10-30 ENCOUNTER — ANCILLARY PROCEDURE (OUTPATIENT)
Dept: CARDIOLOGY | Facility: CLINIC | Age: 65
End: 2020-10-30
Attending: INTERNAL MEDICINE
Payer: COMMERCIAL

## 2020-10-30 DIAGNOSIS — I48.20 CHRONIC ATRIAL FIBRILLATION (H): ICD-10-CM

## 2020-10-30 DIAGNOSIS — I42.9 SECONDARY CARDIOMYOPATHY (H): ICD-10-CM

## 2020-10-30 PROCEDURE — 99207 PR STATISTIC IV PUSH SINGLE INITIAL SUBSTANCE: CPT | Performed by: INTERNAL MEDICINE

## 2020-10-30 PROCEDURE — 93306 TTE W/DOPPLER COMPLETE: CPT | Performed by: INTERNAL MEDICINE

## 2020-10-30 RX ADMIN — Medication 3 ML: at 16:30

## 2020-11-12 ENCOUNTER — ANCILLARY PROCEDURE (OUTPATIENT)
Dept: CARDIOLOGY | Facility: CLINIC | Age: 65
End: 2020-11-12
Attending: INTERNAL MEDICINE
Payer: COMMERCIAL

## 2020-11-12 DIAGNOSIS — I48.20 CHRONIC ATRIAL FIBRILLATION (H): ICD-10-CM

## 2020-11-12 DIAGNOSIS — I42.9 SECONDARY CARDIOMYOPATHY (H): ICD-10-CM

## 2020-11-12 PROCEDURE — 0298T LEADLESS EKG MONITOR 3 TO 14 DAYS: CPT | Performed by: INTERNAL MEDICINE

## 2020-11-12 PROCEDURE — 0296T LEADLESS EKG MONITOR 3 TO 14 DAYS: CPT | Performed by: INTERNAL MEDICINE

## 2020-11-13 ENCOUNTER — ANTICOAGULATION THERAPY VISIT (OUTPATIENT)
Dept: NURSING | Facility: CLINIC | Age: 65
End: 2020-11-13

## 2020-11-13 DIAGNOSIS — Z79.01 LONG TERM CURRENT USE OF ANTICOAGULANT THERAPY: ICD-10-CM

## 2020-11-13 DIAGNOSIS — I48.20 CHRONIC ATRIAL FIBRILLATION (H): ICD-10-CM

## 2020-11-13 LAB
CAPILLARY BLOOD COLLECTION: NORMAL
INR PPP: 1.5 (ref 0.86–1.14)

## 2020-11-13 PROCEDURE — 85610 PROTHROMBIN TIME: CPT | Performed by: FAMILY MEDICINE

## 2020-11-13 PROCEDURE — 36416 COLLJ CAPILLARY BLOOD SPEC: CPT | Performed by: FAMILY MEDICINE

## 2020-11-13 PROCEDURE — 99207 PR NO CHARGE NURSE ONLY: CPT

## 2020-11-13 NOTE — PROGRESS NOTES
ANTICOAGULATION MANAGEMENT     Patient Name:  Cameron Mariano  Date:  2020    ASSESSMENT /SUBJECTIVE:    Today's INR result of 1.5 is subtherapeutic. Goal INR of 2.0-3.0      Warfarin dose taken: Missed dose(s) may be affecting INR    Diet: No new diet changes affecting INR    Medication changes/ interactions: No new medications/supplements affecting INR    Previous INR: Therapeutic     S/S of bleeding or thromboembolism: No    New injury or illness: No    Upcoming surgery, procedure or cardioversion: No    Additional findings: deer hunting last weekend and thinks he missed a dose or two but not sure what days.      PLAN:    Telephone call with Cameron regarding INR result and instructed:     Warfarin Dosing Instructions: 15 mg today then continue your current warfarin dose of 10 mg thursday and 7.5 mg all other days    Instructed patient to follow up no later than: 1 week  Lab visit scheduled    Education provided: Please call back if any changes to your diet, medications or how you've been taking warfarin and Contact the anticoagulation clinic with any changes, questions or concerns at #[phone]       Alondra verbalizes understanding and agrees to warfarin dosing plan.    Instructed to call the Anticoagulation Clinic for any changes, questions or concerns. (#[phone])        Franchesca Jarvis RN      OBJECTIVE:  Recent labs: (last 7 days)     20  0702   INR 1.50*         INR assessment SUB    Recheck INR In: 1 WEEK    INR Location Clinic      Anticoagulation Summary  As of 2020    INR goal:  2.0-3.0   TTR:  78.4 % (1 y)   INR used for dosin.50 (2020)   Warfarin maintenance plan:  10 mg (5 mg x 2) every Thu; 7.5 mg (5 mg x 1.5) all other days   Full warfarin instructions:  : 15 mg; Otherwise 10 mg every Thu; 7.5 mg all other days   Weekly warfarin total:  55 mg   Plan last modified:  Franchesca Jarvis RN (2018)   Next INR check:  2020   Priority:  High   Target end  date:  Indefinite    Indications    Chronic atrial fibrillation (H) [I48.20]  Long-term (current) use of anticoagulants [Z79.01] [Z79.01]             Anticoagulation Episode Summary     INR check location:      Preferred lab:      Send INR reminders to:  CHAPO IZQUIERDO    Comments:        Anticoagulation Care Providers     Provider Role Specialty Phone number    Pablo Benz MD Referring Family Medicine [phone]

## 2020-11-13 NOTE — PROGRESS NOTES
14 days ZioXT applied to patient today. Instructions on use and removal given and mail back instructions discussed with patient. All questions answered. Zio Device registered with [employer] via internet.   Device number: [id number].    Jeff Amador L.P.N.

## 2020-11-13 NOTE — PATIENT INSTRUCTIONS
We have applied a heart monitor (ZIO Patch) for you to wear for 14 days.  You may remove the heart monitor on 11/26/20 at 3:30pm.  Please see the instruction booklet for further information, as well as instructions for removal of the heart monitor and return mailing directions.  If you should have questions regarding your monitor, please call [employer] at [phone].  We will contact you with your results (please see result notification details at bottom of summary).    *PLEASE DO NOT SHOWER OR INDUCE EXCESSIVE SWEATING IN THE FIRST 24 HOURS OF WEARING*

## 2020-11-20 ENCOUNTER — ANTICOAGULATION THERAPY VISIT (OUTPATIENT)
Dept: NURSING | Facility: CLINIC | Age: 65
End: 2020-11-20

## 2020-11-20 DIAGNOSIS — Z79.01 LONG TERM CURRENT USE OF ANTICOAGULANT THERAPY: ICD-10-CM

## 2020-11-20 DIAGNOSIS — I48.20 CHRONIC ATRIAL FIBRILLATION (H): ICD-10-CM

## 2020-11-20 LAB
CAPILLARY BLOOD COLLECTION: NORMAL
INR PPP: 2.1 (ref 0.86–1.14)

## 2020-11-20 PROCEDURE — 99207 PR NO CHARGE NURSE ONLY: CPT

## 2020-11-20 PROCEDURE — 85610 PROTHROMBIN TIME: CPT | Performed by: FAMILY MEDICINE

## 2020-11-20 PROCEDURE — 36416 COLLJ CAPILLARY BLOOD SPEC: CPT | Performed by: FAMILY MEDICINE

## 2020-11-20 NOTE — PROGRESS NOTES
Anticoagulation Management    Unable to reach Cameron today.    Today's INR result of 2.1 is therapeutic (goal INR of 2.0-3.0).  Result received from: Clinic Lab    Follow up required to confirm warfarin dose taken and assess for changes    Left message to continue same warfarin dose of 10 mg thursday and 7.5 mg all other days this weekend.   Call back to [phone] and speak with INR nurse.       Anticoagulation clinic to follow up    Franchesca Jarvis RN  Left message to call me back. [phone]. Franchesca Jarvis RN

## 2020-11-23 NOTE — PROGRESS NOTES
ANTICOAGULATION MANAGEMENT     Patient Name:  Cameron Mariano  Date:  2020    ASSESSMENT /SUBJECTIVE:     INR  result of 2.1 is therapeutic. Goal INR of 2.0-3.0      Warfarin dose taken: Warfarin taken as instructed    Diet: No new diet changes affecting INR    Medication changes/ interactions: No new medications/supplements affecting INR    Previous INR: Subtherapeutic     S/S of bleeding or thromboembolism: No    New injury or illness: No    Upcoming surgery, procedure or cardioversion: No    Additional findings: None      PLAN:    Telephone call with Cameron regarding INR result and instructed:     Warfarin Dosing Instructions: Continue your current warfarin dose 10 mg thursday and 7.5 mg all other days    Instructed patient to follow up no later than: 4 weeks  Lab visit scheduled    Education provided: Please call back if any changes to your diet, medications or how you've been taking warfarin and Contact the anticoagulation clinic with any changes, questions or concerns at #[phone]       Néstor verbalizes understanding and agrees to warfarin dosing plan.    Instructed to call the Anticoagulation Clinic for any changes, questions or concerns. (#[phone])        Franchesca Jarvis RN      OBJECTIVE:  Recent labs: (last 7 days)     20  0711   INR 2.10*         No question data found.  Anticoagulation Summary  As of 2020    INR goal:  2.0-3.0   TTR:  76.8 % (1 y)   INR used for dosin.10 (2020)   Warfarin maintenance plan:  10 mg (5 mg x 2) every Thu; 7.5 mg (5 mg x 1.5) all other days   Full warfarin instructions:  10 mg every Thu; 7.5 mg all other days   Weekly warfarin total:  55 mg   No change documented:  Franchesca Jarvis RN   Plan last modified:  Franchesca Jarvis RN (2018)   Next INR check:  2020   Priority:  High   Target end date:  Indefinite    Indications    Chronic atrial fibrillation (H) [I48.20]  Long-term (current) use of anticoagulants [Z79.01]  [Z79.01]             Anticoagulation Episode Summary     INR check location:      Preferred lab:      Send INR reminders to:  CHAPO IZQUIERDO    Comments:        Anticoagulation Care Providers     Provider Role Specialty Phone number    Pablo Benz MD Referring Family Medicine [phone]       Left message to call me back. [phone]. Franchesca Jarvis RN

## 2020-12-13 ENCOUNTER — HEALTH MAINTENANCE LETTER (OUTPATIENT)
Age: 65
End: 2020-12-13

## 2020-12-16 ENCOUNTER — ANTICOAGULATION THERAPY VISIT (OUTPATIENT)
Dept: NURSING | Facility: CLINIC | Age: 65
End: 2020-12-16

## 2020-12-16 DIAGNOSIS — I48.20 CHRONIC ATRIAL FIBRILLATION (H): ICD-10-CM

## 2020-12-16 DIAGNOSIS — Z79.01 LONG TERM CURRENT USE OF ANTICOAGULANT THERAPY: ICD-10-CM

## 2020-12-16 LAB
CAPILLARY BLOOD COLLECTION: NORMAL
INR PPP: 2.5 (ref 0.86–1.14)

## 2020-12-16 PROCEDURE — 99207 PR NO CHARGE NURSE ONLY: CPT

## 2020-12-16 PROCEDURE — 36416 COLLJ CAPILLARY BLOOD SPEC: CPT | Performed by: FAMILY MEDICINE

## 2020-12-16 PROCEDURE — 85610 PROTHROMBIN TIME: CPT | Performed by: FAMILY MEDICINE

## 2020-12-16 NOTE — PROGRESS NOTES
ANTICOAGULATION MANAGEMENT     Patient Name:  Cameron Mariano  Date:  2020    ASSESSMENT /SUBJECTIVE:    , INR result of 2.5 is therapeutic. Goal INR of 2.0-3.0      Warfarin dose taken: Warfarin taken as instructed    Diet: No new diet changes affecting INR    Medication changes/ interactions: No new medications/supplements affecting INR    Previous INR: Therapeutic     S/S of bleeding or thromboembolism: No    New injury or illness: No    Upcoming surgery, procedure or cardioversion: No    Additional findings: None      PLAN:    Telephone call with Cameron regarding INR result and instructed:     Warfarin Dosing Instructions: Continue your current warfarin dose 10 mg Thursay and 7.5 mg all other days    Instructed patient to follow up no later than: 5 weeks  Lab visit scheduled    Education provided: Please call back if any changes to your diet, medications or how you've been taking warfarin and Contact the anticoagulation clinic with any changes, questions or concerns at #[phone]       Cameron verbalizes understanding and agrees to warfarin dosing plan.    Instructed to call the Anticoagulation Clinic for any changes, questions or concerns. (#[phone])        Franchesca Jarvis RN      OBJECTIVE:  Recent labs: (last 7 days)     20  1544   INR 2.50*         INR assessment THER    Recheck INR In: 4 WEEKS    INR Location Clinic      Anticoagulation Summary  As of 2020    INR goal:  2.0-3.0   TTR:  76.9 % (1 y)   INR used for dosin.50 (2020)   Warfarin maintenance plan:  10 mg (5 mg x 2) every Thu; 7.5 mg (5 mg x 1.5) all other days   Full warfarin instructions:  10 mg every Thu; 7.5 mg all other days   Weekly warfarin total:  55 mg   No change documented:  Franchesca Jarvis RN   Plan last modified:  Franchesca Jarvis RN (2018)   Next INR check:     Priority:  High   Target end date:  Indefinite    Indications    Chronic atrial fibrillation (H) [I48.20]  Long-term  (current) use of anticoagulants [Z79.01] [Z79.01]             Anticoagulation Episode Summary     INR check location:      Preferred lab:      Send INR reminders to:  CHAPO IZQUIERDO    Comments:        Anticoagulation Care Providers     Provider Role Specialty Phone number    Pablo Benz MD Referring Family Medicine [phone]       Anticoagulation Management    Unable to reach Néstor today.    Today's INR result of 2.5 is therapeutic (goal INR of 2.0-3.0).  Result received from: Clinic Lab    Follow up required to confirm warfarin dose taken and assess for changes    Left message to continue current dose of warfarin 7.5 mg tonight.      Anticoagulation clinic to follow up    Franchesca Jarvis RN  Anticoagulation Management    Unable to reach Néstor today.    Today's INR result of 2.5 is therapeutic (goal INR of 2.0-3.0).  Result received from: Clinic Lab    Follow up required to confirm warfarin dose taken and assess for changes    No instructions provided. Unable to leave voicemail.      Anticoagulation clinic to follow up    Franchesca Jarvis RN

## 2020-12-18 RX ORDER — WARFARIN SODIUM 5 MG/1
TABLET ORAL
Qty: 138 TABLET | Refills: 0 | Status: ON HOLD | OUTPATIENT
Start: 2020-12-18 | End: 2021-03-12

## 2021-01-13 DIAGNOSIS — I10 HYPERTENSION GOAL BP (BLOOD PRESSURE) < 140/90: ICD-10-CM

## 2021-01-14 RX ORDER — DILTIAZEM HYDROCHLORIDE 240 MG/1
CAPSULE, EXTENDED RELEASE ORAL
Qty: 90 CAPSULE | Refills: 0 | Status: SHIPPED | OUTPATIENT
Start: 2021-01-14 | End: 2021-04-23

## 2021-01-18 ENCOUNTER — VIRTUAL VISIT (OUTPATIENT)
Dept: CARDIOLOGY | Facility: CLINIC | Age: 66
End: 2021-01-18
Payer: COMMERCIAL

## 2021-01-18 DIAGNOSIS — I10 HYPERTENSION GOAL BP (BLOOD PRESSURE) < 140/90: ICD-10-CM

## 2021-01-18 DIAGNOSIS — I48.11 LONGSTANDING PERSISTENT ATRIAL FIBRILLATION (H): Primary | ICD-10-CM

## 2021-01-18 DIAGNOSIS — I42.8 CARDIOMYOPATHY, NONISCHEMIC (H): ICD-10-CM

## 2021-01-18 PROCEDURE — 99214 OFFICE O/P EST MOD 30 MIN: CPT | Mod: 95 | Performed by: INTERNAL MEDICINE

## 2021-01-18 RX ORDER — METOPROLOL SUCCINATE 100 MG/1
100 TABLET, EXTENDED RELEASE ORAL DAILY
Qty: 90 TABLET | Refills: 1 | Status: SHIPPED | OUTPATIENT
Start: 2021-01-18 | End: 2021-02-01

## 2021-01-18 RX ORDER — METOPROLOL SUCCINATE 50 MG/1
50 TABLET, EXTENDED RELEASE ORAL DAILY
Qty: 90 TABLET | Refills: 1 | Status: SHIPPED | OUTPATIENT
Start: 2021-01-18 | End: 2021-02-01 | Stop reason: DRUGHIGH

## 2021-01-18 NOTE — LETTER
"1/18/2021      RE: Cameron Mariano  [address]  [place] [facility] [address]       Dear Colleague,    Thank you for the opportunity to participate in the care of your patient, Cameron Mariano, at the [facility]. Please see a copy of my visit note below.    Cameron is a 65 year old who is being evaluated via a billable video visit.      How would you like to obtain your AVS? MyChart  If the video visit is dropped, the invitation should be resent by: Text to cell phone: [phone]  Will anyone else be joining your video visit? No      Electrophysiology Clinic Video Virtual Visit    Cameron Mariano is a 65 year old male who is being evaluated via a billable video visit.      The patient has been notified of following:     \"This video visit will be conducted via a call between you and your physician/provider. We have found that certain health care needs can be provided without the need for an in-person physical exam.  This service lets us provide the care you need with a video conversation.  If a prescription is necessary we can send it directly to your pharmacy.  If lab work is needed we can place an order for that and you can then stop by our lab to have the test done at a later time.    If during the course of the call the physician/provider feels a video visit is not appropriate, you will not be charged for this service.\"     Physician/provider has received verbal consent for a Video Visit from the patient? Yes      HPI:   Mr Cameron Mariano is a 65 year old gentleman with permanent atrial fibrillation, HTN, and NICM among others who returns to clinic for management of his atrial fibrillation.He was first diagnosed with atrial fibrillation in 2007 when he was admitted to [facility] with new onset congestive heart failure and found to have cardiomyopathy with LVEF of 25-30% range. 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 With restoration of sinus rhythm and appropriate medical therapy, in 2008 his LV function normalized to 55-60%; he was noted to have bi-atrial enlargement; but no significant valvular abnormalities. He was then in sinus rhythm for more than a year until he was again found to be in atrial fibrillation in September 2009 and required DCCV. Since then he has had two more cardioversions, last in February 2011 while on Sotalol therapy. NSR only lasted a few days and he was found to be in atrial fibrillation soon after on follow up. Thereafter, a rate control strategy was pursued. He has been on Toprol  mg once daily & Cardizem 240 mg once daily, along with warfarin for anti-coagulation.He was seen in clinic for consultation on 10/26/2020 at which time repeat TTE and zio patch monitor were ordered.     Repeat TTE on 10/30/20 showed a reduced ejection fraction (40-45%) with mild LV dilation, and MOHAMUD of 44.9, no significant valvular abnormalities. Zio patch monitoring showed 100% atrial fibrillation with an average heart rate of 97bpm and 67 episodes of wide complex tachycardia, likely representative of aberrant conduction though could have some NSVT.    Today, he returns to clinic for follow-up. He feels about the same as he has. 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 He does not have significant exertional dyspnea, fatigue, palpitations, lightheadedness, dizziness, or syncope. He denies episodic chest pain. He denies orthopnea, PND, or extremity edema. He continues on metoprolol 100mg daily, diltiazem 240mg daily, lisinopril 2.5mg daily, atorvastatin 40mg and warfarin.    PAST MEDICAL HISTORY:  Past Medical History:   Diagnosis Date     Adhesive capsulitis of left shoulder 4/13/2019     Atrial fibrillation (H)      History of alcohol abuse      Hypertension      MEDICAL HISTORY OF -      cardioversion X 2     Other primary cardiomyopathies     Cardiomyopathy     Peyronie's disease        CURRENT MEDICATIONS:  Current Outpatient Medications   Medication Sig Dispense Refill     aspirin 81 MG tablet Take 1 tablet by mouth daily.       atorvastatin (LIPITOR) 40 MG tablet Take 1 tablet (40 mg) by mouth daily for cholesterol Pharmacy ok to hold prescription until due 90 tablet 3     CARTIA  MG 24 hr capsule Take 1 capsule by mouth once daily 90 capsule 0     Cholecalciferol (VITAMIN D) 400 UNITS capsule Take 1 capsule by mouth daily.       fish oil-omega-3 fatty acids (FISH OIL) 1000 MG capsule Take 2 g by mouth daily.       lisinopril (ZESTRIL) 2.5 MG tablet Take 1 tablet (2.5 mg) by mouth daily 90 tablet 1     MULTI-VITAMIN OR TABS 1 TABLET DAILY       sildenafil (REVATIO) 20 MG tablet TAKE ONE TO TWO TABLETS BY MOUTH ONCE DAILY AS NEEDED FOR  ED,  NEVER  USE  WITH  NITROGLYCERIN,  TERAZOSIN  OR  DOXAZOSIN 20 tablet 3     warfarin ANTICOAGULANT (COUMADIN) 5 MG tablet TAKE 2 TABLETS (10MG) BY MOUTH ON THURSDAYS AND 1 AND 1/2 TABLETS (7.5MG) ON THE REST OF THE WEEK DAYS AS DIRECTED 138 tablet 0     metoprolol succinate ER (TOPROL-XL) 100 MG 24 hr tablet Take 1 tablet (100 mg) by mouth daily Take at bedtime Pharmacy ok to hold prescription until due (Patient not taking: Reported on 10/26/2020) 90 tablet 1       PAST SURGICAL HISTORY:  Past Surgical History:   Procedure Laterality Date     APPENDECTOMY         ALLERGIES:     Allergies   Allergen Reactions     Aspartame        FAMILY HISTORY:  Family History   Problem Relation Age of Onset     Blood Disease Mother         blood clots     Cancer Father         stomach     Alcohol/Drug Father         smoker     Heart Disease Brother        SOCIAL HISTORY:  Social History     Tobacco Use     Smoking status: Never Smoker     Smokeless tobacco: Never Used   Substance Use Topics     Alcohol use: Yes     Comment: 3-4 beers a week     Drug use: No        ROS:  10 point ROS neg other than the symptoms noted above in the HPI.    Exam:  The rest of a comprehensive physical examination is deferred due to public health emergency video visit restrictions.  CONSITUTIONAL: no acute distress  HEENT: no icterus, no redness or discharge  CV: no visible edema of visualized extremities.  RESPIRATORY: respirations nonlabored, no cough  NEURO: AA&Ox3, speech fluent/appropriate, motor grossly nonfocal  PSYCH: cooperative, affect appropriate  DERM: no rashes on visualized face/neck/upper extremities    Labs:  Reviewed.     Testing/Procedures:  PULMONARY FUNCTION TESTS:   No flowsheet data found.      ECHOCARDIOGRAM:   Interpretation Summary     The Ejection Fraction is estimated at 40-45%.  LVEF 41% based on biplane 2D tracing.  Mild left ventricular dilation is present.  Right ventricular function, chamber size, wall motion, and thickness are  normal.  Pulmonary artery systolic pressure is normal.  The inferior vena cava is normal.  No pericardial effusion is present.  There is no prior study for direct comparison.  LA Volume Index (BP): 44.9 ml/m2      ZIO PATCH      Assessment and Plan:   Mr Cameron Mariano is a 64yo M with permanent atrial fibrillation. He remains asymptomatic, however has worsening of his LV systolic function as shown on recent echocardiogram. Given his long-standing atrial fibrillation and MOHAMUD of 45, he is unlikely to maintain sinus rhythm for long, even if it were restored through ablation or cardioversion. With this in mind, and in light of his reduced LV systolic function, we will elect for a tighter rate control strategy.    #Permanent Atrial Fibrillation, [id number] = 2  #NICM, presumably tachycardia mediated  #HTN  Rate control: increase metoprolol to 150mg daily, continue diltiazem 240mg daily. Repeat 1 week zio patch monitor in 2 weeks for reassessment of heart rate on increased beta-blocker.  Anticoagulation: continue warfarin for now given  other medication adjustments, consider DOAC in the future    Return to clinic in 2 weeks. Will plan to increase lisinopril at that time provided he tolerates increased metoprolol dose well. Repeat Echo in 3 months for reassessment of LV function with increased medical therapy.    Video-Visit Details    Type of service:  Video Visit    Video Visit start time 4:55 PM; video visit end time 5:03 PM    Originating Location (pt. Location): Home    Distant Location (provider location):  [facility] HEART [facility]     Mode of Communication:  Video Conference via [email or web address]      Rashad Anders MD  Cardiology Fellow    I have seen and interviewed patient. I have reviewed the laboratory tests, imaging, and other investigations. I have reviewed the management plan with the patient. I discussed with the team and agree with the findings and plan in this resident/fellow/nurse practitioner's note. In addition, changes in the physical examination, assessment and plan have been incorporated into the note by myself, as to make it a single cohesive document.       Tami Calabrese MD, MS  Cardiology/Cardiac EP Attending Staff            Please do not hesitate to contact me if you have any questions/concerns.     Sincerely,     Tami Calabrese MD

## 2021-01-18 NOTE — PATIENT INSTRUCTIONS
Thank you for coming to the [facility] @ [facility]; please note the following instructions:    1.  Increase metoprolol to 150 mg daily    2.  Follow up in 2 weeks to discuss starting lisinopril        If you have any questions regarding your visit please contact your care team:     Cardiology  Telephone Number   Tina VILLEGAS, RN  Olena RODRIGUEZ, RN   Alanna SULLIVAN, RMA  Sindy SHERMAN, RMA  Jeff SWAIN, LPN   [phone] (option 1)   For scheduling appts:     [phone] (select option 1)       For the Device Clinic (Pacemakers and ICD's)  RN's :  Ana Valentino   During business hours: [phone]    *After business hours:  [phone] (select option 4)      Normal test result notifications will be released via [employer] or mailed within 7 business days.  All other test results, will be communicated via telephone once reviewed by your cardiologist.    If you need a medication refill please contact your pharmacy.  Please allow 3 business days for your refill to be completed.    As always, thank you for trusting us with your health care needs!

## 2021-01-18 NOTE — PROGRESS NOTES
"Cameron is a 65 year old who is being evaluated via a billable video visit.      How would you like to obtain your AVS? MyChart  If the video visit is dropped, the invitation should be resent by: Text to cell phone: [phone]  Will anyone else be joining your video visit? No      Electrophysiology Clinic Video Virtual Visit    Cameron Mariano is a 65 year old male who is being evaluated via a billable video visit.      The patient has been notified of following:     \"This video visit will be conducted via a call between you and your physician/provider. We have found that certain health care needs can be provided without the need for an in-person physical exam.  This service lets us provide the care you need with a video conversation.  If a prescription is necessary we can send it directly to your pharmacy.  If lab work is needed we can place an order for that and you can then stop by our lab to have the test done at a later time.    If during the course of the call the physician/provider feels a video visit is not appropriate, you will not be charged for this service.\"     Physician/provider has received verbal consent for a Video Visit from the patient? Yes      HPI:   Mr Cameron Mariano is a 65 year old gentleman with permanent atrial fibrillation, HTN, and NICM among others who returns to clinic for management of his atrial fibrillation.He was first diagnosed with atrial fibrillation in 2007 when he was admitted to [facility] with new onset congestive heart failure and found to have cardiomyopathy with LVEF of 25-30% range. 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 With restoration of sinus rhythm and appropriate medical therapy, in 2008 his LV function normalized to 55-60%; he was noted to have bi-atrial enlargement; but no significant valvular abnormalities. He was then in sinus rhythm for more than a year until " he was again found to be in atrial fibrillation in September 2009 and required DCCV. Since then he has had two more cardioversions, last in February 2011 while on Sotalol therapy. NSR only lasted a few days and he was found to be in atrial fibrillation soon after on follow up. Thereafter, a rate control strategy was pursued. He has been on Toprol  mg once daily & Cardizem 240 mg once daily, along with warfarin for anti-coagulation.He was seen in clinic for consultation on 10/26/2020 at which time repeat TTE and zio patch monitor were ordered.     Repeat TTE on 10/30/20 showed a reduced ejection fraction (40-45%) with mild LV dilation, and MOHAMUD of 44.9, no significant valvular abnormalities. Zio patch monitoring showed 100% atrial fibrillation with an average heart rate of 97bpm and 67 episodes of wide complex tachycardia, likely representative of aberrant conduction though could have some NSVT.    Today, he returns to clinic for follow-up. He feels about the same as he has. 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 He does not have significant exertional dyspnea, fatigue, palpitations, lightheadedness, dizziness, or syncope. He denies episodic chest pain. He denies orthopnea, PND, or extremity edema. He continues on metoprolol 100mg daily, diltiazem 240mg daily, lisinopril 2.5mg daily, atorvastatin 40mg and warfarin.    PAST MEDICAL HISTORY:  Past Medical History:   Diagnosis Date     Adhesive capsulitis of left shoulder 4/13/2019     Atrial fibrillation (H)      History of alcohol abuse      Hypertension      MEDICAL HISTORY OF -     cardioversion X 2     Other primary cardiomyopathies     Cardiomyopathy     Peyronie's disease        CURRENT MEDICATIONS:  Current Outpatient Medications   Medication Sig Dispense Refill     aspirin 81 MG tablet Take 1 tablet by mouth daily.       atorvastatin (LIPITOR) 40 MG tablet Take 1 tablet (40 mg) by mouth daily for cholesterol  Pharmacy ok to hold prescription until due 90 tablet 3     CARTIA  MG 24 hr capsule Take 1 capsule by mouth once daily 90 capsule 0     Cholecalciferol (VITAMIN D) 400 UNITS capsule Take 1 capsule by mouth daily.       fish oil-omega-3 fatty acids (FISH OIL) 1000 MG capsule Take 2 g by mouth daily.       lisinopril (ZESTRIL) 2.5 MG tablet Take 1 tablet (2.5 mg) by mouth daily 90 tablet 1     MULTI-VITAMIN OR TABS 1 TABLET DAILY       sildenafil (REVATIO) 20 MG tablet TAKE ONE TO TWO TABLETS BY MOUTH ONCE DAILY AS NEEDED FOR  ED,  NEVER  USE  WITH  NITROGLYCERIN,  TERAZOSIN  OR  DOXAZOSIN 20 tablet 3     warfarin ANTICOAGULANT (COUMADIN) 5 MG tablet TAKE 2 TABLETS (10MG) BY MOUTH ON THURSDAYS AND 1 AND 1/2 TABLETS (7.5MG) ON THE REST OF THE WEEK DAYS AS DIRECTED 138 tablet 0     metoprolol succinate ER (TOPROL-XL) 100 MG 24 hr tablet Take 1 tablet (100 mg) by mouth daily Take at bedtime Pharmacy ok to hold prescription until due (Patient not taking: Reported on 10/26/2020) 90 tablet 1       PAST SURGICAL HISTORY:  Past Surgical History:   Procedure Laterality Date     APPENDECTOMY         ALLERGIES:     Allergies   Allergen Reactions     Aspartame        FAMILY HISTORY:  Family History   Problem Relation Age of Onset     Blood Disease Mother         blood clots     Cancer Father         stomach     Alcohol/Drug Father         smoker     Heart Disease Brother        SOCIAL HISTORY:  Social History     Tobacco Use     Smoking status: Never Smoker     Smokeless tobacco: Never Used   Substance Use Topics     Alcohol use: Yes     Comment: 3-4 beers a week     Drug use: No       ROS:  10 point ROS neg other than the symptoms noted above in the HPI.    Exam:  The rest of a comprehensive physical examination is deferred due to public health emergency video visit restrictions.  CONSITUTIONAL: no acute distress  HEENT: no icterus, no redness or discharge  CV: no visible edema of visualized extremities.  RESPIRATORY:  respirations nonlabored, no cough  NEURO: AA&Ox3, speech fluent/appropriate, motor grossly nonfocal  PSYCH: cooperative, affect appropriate  DERM: no rashes on visualized face/neck/upper extremities    Labs:  Reviewed.     Testing/Procedures:  PULMONARY FUNCTION TESTS:   No flowsheet data found.      ECHOCARDIOGRAM:   Interpretation Summary     The Ejection Fraction is estimated at 40-45%.  LVEF 41% based on biplane 2D tracing.  Mild left ventricular dilation is present.  Right ventricular function, chamber size, wall motion, and thickness are  normal.  Pulmonary artery systolic pressure is normal.  The inferior vena cava is normal.  No pericardial effusion is present.  There is no prior study for direct comparison.  LA Volume Index (BP): 44.9 ml/m2      ZIO PATCH      Assessment and Plan:   Mr Cameron Mariano is a 64yo M with permanent atrial fibrillation. He remains asymptomatic, however has worsening of his LV systolic function as shown on recent echocardiogram. Given his long-standing atrial fibrillation and MOHAMUD of 45, he is unlikely to maintain sinus rhythm for long, even if it were restored through ablation or cardioversion. With this in mind, and in light of his reduced LV systolic function, we will elect for a tighter rate control strategy.    #Permanent Atrial Fibrillation, [id number] = 2  #NICM, presumably tachycardia mediated  #HTN  Rate control: increase metoprolol to 150mg daily, continue diltiazem 240mg daily. Repeat 1 week zio patch monitor in 2 weeks for reassessment of heart rate on increased beta-blocker.  Anticoagulation: continue warfarin for now given other medication adjustments, consider DOAC in the future    Return to clinic in 2 weeks. Will plan to increase lisinopril at that time provided he tolerates increased metoprolol dose well. Repeat Echo in 3 months for reassessment of LV function with increased medical therapy.    Video-Visit Details    Type of service:  Video Visit    Video Visit  start time 4:55 PM; video visit end time 5:03 PM    Originating Location (pt. Location): Home    Distant Location (provider location):  [facility]     Mode of Communication:  Video Conference via [employer]      Rashad Anders MD  Cardiology Fellow    I have seen and interviewed patient. I have reviewed the laboratory tests, imaging, and other investigations. I have reviewed the management plan with the patient. I discussed with the team and agree with the findings and plan in this resident/fellow/nurse practitioner's note. In addition, changes in the physical examination, assessment and plan have been incorporated into the note by myself, as to make it a single cohesive document.       Tami Calabrese MD, MS  Cardiology/Cardiac EP Attending Staff

## 2021-01-22 ENCOUNTER — ANTICOAGULATION THERAPY VISIT (OUTPATIENT)
Dept: NURSING | Facility: CLINIC | Age: 66
End: 2021-01-22

## 2021-01-22 DIAGNOSIS — Z79.01 LONG TERM CURRENT USE OF ANTICOAGULANT THERAPY: ICD-10-CM

## 2021-01-22 DIAGNOSIS — I48.20 CHRONIC ATRIAL FIBRILLATION (H): ICD-10-CM

## 2021-01-22 LAB
CAPILLARY BLOOD COLLECTION: NORMAL
INR PPP: 2.1 (ref 0.86–1.14)

## 2021-01-22 PROCEDURE — 85610 PROTHROMBIN TIME: CPT | Performed by: FAMILY MEDICINE

## 2021-01-22 PROCEDURE — 99207 PR NO CHARGE NURSE ONLY: CPT

## 2021-01-22 PROCEDURE — 36416 COLLJ CAPILLARY BLOOD SPEC: CPT | Performed by: FAMILY MEDICINE

## 2021-01-22 NOTE — PROGRESS NOTES
Anticoagulation Management    Unable to reach Cameron today.    Today's INR result of 2.1 is therapeutic (goal INR of 2.0-3.0).  Result received from: Clinic Lab    Follow up required to confirm warfarin dose taken and assess for changes    Left message to continue current dose of warfarin 7.5 mg tonight.  Left message to call back to nurse before 5 pm. If no changes or new concerns continue same weekly warfarin dose until you speak with nurse.     Anticoagulation clinic to follow up    Franchesca Jarvis -[phone]

## 2021-01-25 ENCOUNTER — TELEPHONE (OUTPATIENT)
Dept: CARDIOLOGY | Facility: CLINIC | Age: 66
End: 2021-01-25

## 2021-01-25 DIAGNOSIS — I48.20 CHRONIC ATRIAL FIBRILLATION (H): Primary | ICD-10-CM

## 2021-01-25 NOTE — PROGRESS NOTES
ANTICOAGULATION MANAGEMENT     Patient Name:  Cameron Mariano  Date:  2021    ASSESSMENT /SUBJECTIVE:     INR result of 2.10 is therapeutic. Goal INR of 2.0-3.0      Warfarin dose taken: Warfarin taken as instructed    Diet: No new diet changes affecting INR    Medication changes/ interactions: No new medications/supplements affecting INR    Previous INR: Therapeutic     S/S of bleeding or thromboembolism: No    New injury or illness: No    Upcoming surgery, procedure or cardioversion: No    Additional findings: None      PLAN:    Telephone call with Cameron regarding INR result and instructed:     Warfarin Dosing Instructions: Continue your current warfarin dose 10 mg Th and 7.5 mg all other days    Instructed patient to follow up no later than: 5 weeks  Lab visit scheduled    Education provided: Please call back if any changes to your diet, medications or how you've been taking warfarin      Cameron verbalizes understanding and agrees to warfarin dosing plan.    Instructed to call the Anticoagulation Clinic for any changes, questions or concerns. (#[phone])        Franchesca Jarvis RN      OBJECTIVE:  Recent labs: (last 7 days)     21  0713   INR 2.10*         INR assessment THER    Recheck INR In: 5 WEEKS    INR Location Clinic      Anticoagulation Summary  As of 2021    INR goal:  2.0-3.0   TTR:  87.0 % (1 y)   INR used for dosin.10 (2021)   Warfarin maintenance plan:  10 mg (5 mg x 2) every Thu; 7.5 mg (5 mg x 1.5) all other days   Full warfarin instructions:  10 mg every Thu; 7.5 mg all other days   Weekly warfarin total:  55 mg   No change documented:  Franchesca Jarvis RN   Plan last modified:  Franchesca Jarvis RN (2018)   Next INR check:  2021   Priority:  Maintenance   Target end date:  Indefinite    Indications    Chronic atrial fibrillation (H) [I48.20]  Long-term (current) use of anticoagulants [Z79.01] [Z79.01]             Anticoagulation Episode Summary      INR check location:      Preferred lab:      Send INR reminders to:  CHAPO IZQUIERDO    Comments:        Anticoagulation Care Providers     Provider Role Specialty Phone number    Pablo Benz MD Referring Family Medicine [phone]

## 2021-01-25 NOTE — TELEPHONE ENCOUNTER
[facility] Call Center    Phone Message    May a detailed message be left on voicemail: yes     Reason for Call: Other: Pt reports that  wanted him to have a heart monitor and possibly an EKG, but there are no orders in place. Please place an order if he needs those done and give him a call back to let him know.     Action Taken: Message routed to:  [facility] ([facility]): cardio    Travel Screening: Not Applicable

## 2021-01-26 NOTE — TELEPHONE ENCOUNTER
Patient needs a 1 week zio the week of Feb 1st.  Maybe he would prefer to have it mailed to him.  FYI:  He has an appt with Dr. Calabrese 2/1 but the zio results do not need to be prior to this appt.    Please call patient to set up zio.  Tina Bolanos RN  Cardiology Care Coordinator  [facility]  [phone] option 1

## 2021-01-27 NOTE — TELEPHONE ENCOUNTER
Patient prefers Zio to be mailed to residence .Zio will be registered today at [facility] , zio registered at zio suite today .Jeff Amador L.P.N.,Maryann judd. Dept.

## 2021-02-01 ENCOUNTER — VIRTUAL VISIT (OUTPATIENT)
Dept: CARDIOLOGY | Facility: CLINIC | Age: 66
End: 2021-02-01
Payer: COMMERCIAL

## 2021-02-01 DIAGNOSIS — I48.21 PERMANENT ATRIAL FIBRILLATION (H): Primary | ICD-10-CM

## 2021-02-01 DIAGNOSIS — I42.8 CARDIOMYOPATHY, NONISCHEMIC (H): ICD-10-CM

## 2021-02-01 DIAGNOSIS — I10 HYPERTENSION GOAL BP (BLOOD PRESSURE) < 140/90: ICD-10-CM

## 2021-02-01 PROCEDURE — 99213 OFFICE O/P EST LOW 20 MIN: CPT | Mod: 95 | Performed by: INTERNAL MEDICINE

## 2021-02-01 RX ORDER — METOPROLOL SUCCINATE 100 MG/1
100 TABLET, EXTENDED RELEASE ORAL DAILY
Qty: 90 TABLET | Refills: 0
Start: 2021-02-01 | End: 2021-08-25

## 2021-02-01 NOTE — PATIENT INSTRUCTIONS
Thank you for coming to the [facility] @ [facility]; please note the following instructions:    1. Decrease metoprolol to 100 mg daily    2.  Continue to wear the zio heart monitor for an extra week.    3.  Follow up in 2 weeks to discuss cardiac medications    4.  Follow up in 4 weeks to discuss zio results        If you have any questions regarding your visit please contact your care team:     Cardiology  Telephone Number   Tina VILLEGAS, RN  Olena RODRIGUEZ, RN   Alanna SULLIVAN, PIERRE SHERMAN, PIERRE SWAIN, LPN   [phone] (option 1)   For scheduling appts:     [phone] (select option 1)       For the Device Clinic (Pacemakers and ICD's)  RN's :  Ana Valentino   During business hours: [phone]    *After business hours:  [phone] (select option 4)      Normal test result notifications will be released via [employer] or mailed within 7 business days.  All other test results, will be communicated via telephone once reviewed by your cardiologist.    If you need a medication refill please contact your pharmacy.  Please allow 3 business days for your refill to be completed.    As always, thank you for trusting us with your health care needs!

## 2021-02-01 NOTE — LETTER
"2/1/2021      RE: Cameron Mariano  [address]  [place] [address]       Dear Colleague,    Thank you for the opportunity to participate in the care of your patient, Cameron Mariano, at the [facility]IC [facility]. Please see a copy of my visit note below.    Electrophysiology Clinic Telephone Visit    Cameron Mariano is a 65 year old male who is being evaluated via a billable telephone visit.      The patient has been notified of following:     \"This telephone visit will be conducted via a call between you and your physician/provider. We have found that certain health care needs can be provided without the need for a physical exam.  This service lets us provide the care you need with a short phone conversation.  If a prescription is necessary we can send it directly to your pharmacy.  If lab work is needed we can place an order for that and you can then stop by our lab to have the test done at a later time.    If during the course of the call the physician/provider feels a telephone visit is not appropriate, you will not be charged for this service.\"   Patient has given verbal consent for Telephone visit?  Yes    HPI: Purpose of visit: Follow-up of atrial fibrillation    Mr Cameron Mariano is a 65 year old gentleman with permanent atrial fibrillation, HTN, and NICM among others who returns to clinic for management of his atrial fibrillation.He was first diagnosed with atrial fibrillation in 2007 when he was admitted to [facility] with new onset congestive heart failure and found to have cardiomyopathy with LVEF of 25-30% range. 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 With restoration of sinus rhythm and appropriate medical therapy, in 2008 his LV function normalized to 55-60%; he was noted to have bi-atrial enlargement; but no significant " valvular abnormalities. He was then in sinus rhythm for more than a year until he was again found to be in atrial fibrillation in September 2009 and required DCCV. Since then he has had two more cardioversions, last in February 2011 while on Sotalol therapy. NSR only lasted a few days and he was found to be in atrial fibrillation soon after on follow up. Thereafter, a rate control strategy was pursued. He has been on Toprol  mg once daily & Cardizem 240 mg once daily, along with warfarin for anti-coagulation.    Patient's last visit with me was 2 weeks ago.  At that time, we recommended increasing metoprolol 250 mg once daily to achieve better rate control.  However, in the last 2 weeks, patient has noticed worsening fatigue and generalized tiredness.  He believes that this is related to the higher dose of metoprolol.     He denies any symptoms of palpitations, irregular heartbeat sensation, frequent lightheadedness, presyncope or syncope.  He has just started wearing the Zio patch 2 days ago.    PAST MEDICAL HISTORY:  Past Medical History:   Diagnosis Date     Adhesive capsulitis of left shoulder 4/13/2019     Atrial fibrillation (H)      History of alcohol abuse      Hypertension      MEDICAL HISTORY OF -     cardioversion X 2     Other primary cardiomyopathies     Cardiomyopathy     Peyronie's disease        CURRENT MEDICATIONS:  Current Outpatient Medications   Medication Sig Dispense Refill     aspirin 81 MG tablet Take 1 tablet by mouth daily.       atorvastatin (LIPITOR) 40 MG tablet Take 1 tablet (40 mg) by mouth daily for cholesterol Pharmacy ok to hold prescription until due 90 tablet 3     CARTIA  MG 24 hr capsule Take 1 capsule by mouth once daily 90 capsule 0     Cholecalciferol (VITAMIN D) 400 UNITS capsule Take 1 capsule by mouth daily.       fish oil-omega-3 fatty acids (FISH OIL) 1000 MG capsule Take 2 g by mouth daily.       lisinopril (ZESTRIL) 2.5 MG tablet Take 1 tablet (2.5 mg) by  mouth daily 90 tablet 1     metoprolol succinate ER (TOPROL-XL) 100 MG 24 hr tablet Take 1 tablet (100 mg) by mouth daily 90 tablet 0     MULTI-VITAMIN OR TABS 1 TABLET DAILY       sildenafil (REVATIO) 20 MG tablet TAKE ONE TO TWO TABLETS BY MOUTH ONCE DAILY AS NEEDED FOR  ED,  NEVER  USE  WITH  NITROGLYCERIN,  TERAZOSIN  OR  DOXAZOSIN 20 tablet 3     warfarin ANTICOAGULANT (COUMADIN) 5 MG tablet TAKE 2 TABLETS (10MG) BY MOUTH ON THURSDAYS AND 1 AND 1/2 TABLETS (7.5MG) ON THE REST OF THE WEEK DAYS AS DIRECTED 138 tablet 0       PAST SURGICAL HISTORY:  Past Surgical History:   Procedure Laterality Date     APPENDECTOMY         ALLERGIES:     Allergies   Allergen Reactions     Aspartame        FAMILY HISTORY:  Family History   Problem Relation Age of Onset     Blood Disease Mother         blood clots     Cancer Father         stomach     Alcohol/Drug Father         smoker     Heart Disease Brother        SOCIAL HISTORY:  Social History     Tobacco Use     Smoking status: Never Smoker     Smokeless tobacco: Never Used   Substance Use Topics     Alcohol use: Yes     Comment: 3-4 beers a week     Drug use: No       ROS:  10 point ROS neg other than the symptoms noted above in the HPI.    Labs:  Reviewed.       Assessment and Plan:   Permanent atrial fibrillation  Nonischemic cardiomyopathy-ejection fraction 40 to 45%, left atrial volume index 45    I discussed with patient his complex medical situation.  I recommended that he should go back to his original dose of metoprolol XL at 100 mg once daily.  Patient wear the current Zio patch for a total duration of 2 weeks.    I will see patient again in 2 weeks and consider increasing the dose of lisinopril.    Subsequently, I will see him again in another 2 weeks to discuss the results of the Zio patch.  Depending on the results of the Zio patch, we will consider other options for achieving adequate rate control    Telephone Visit Duration: 15 minutes      Please do not  hesitate to contact me if you have any questions/concerns.     Sincerely,     Tami Calabrese MD

## 2021-02-01 NOTE — PROGRESS NOTES
"Electrophysiology Clinic Telephone Visit    Cameron Mariano is a 65 year old male who is being evaluated via a billable telephone visit.      The patient has been notified of following:     \"This telephone visit will be conducted via a call between you and your physician/provider. We have found that certain health care needs can be provided without the need for a physical exam.  This service lets us provide the care you need with a short phone conversation.  If a prescription is necessary we can send it directly to your pharmacy.  If lab work is needed we can place an order for that and you can then stop by our lab to have the test done at a later time.    If during the course of the call the physician/provider feels a telephone visit is not appropriate, you will not be charged for this service.\"   Patient has given verbal consent for Telephone visit?  Yes    HPI: Purpose of visit: Follow-up of atrial fibrillation    Mr Cameron Mariano is a 65 year old gentleman with permanent atrial fibrillation, HTN, and NICM among others who returns to clinic for management of his atrial fibrillation.He was first diagnosed with atrial fibrillation in 2007 when he was admitted to [facility] with new onset congestive heart failure and found to have cardiomyopathy with LVEF of 25-30% range. 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 With restoration of sinus rhythm and appropriate medical therapy, in 2008 his LV function normalized to 55-60%; he was noted to have bi-atrial enlargement; but no significant valvular abnormalities. He was then in sinus rhythm for more than a year until he was again found to be in atrial fibrillation in September 2009 and required DCCV. Since then he has had two more cardioversions, last in February 2011 while on Sotalol therapy. NSR only lasted a few days and he was found to be in atrial fibrillation soon " after on follow up. Thereafter, a rate control strategy was pursued. He has been on Toprol  mg once daily & Cardizem 240 mg once daily, along with warfarin for anti-coagulation.    Patient's last visit with me was 2 weeks ago.  At that time, we recommended increasing metoprolol 250 mg once daily to achieve better rate control.  However, in the last 2 weeks, patient has noticed worsening fatigue and generalized tiredness.  He believes that this is related to the higher dose of metoprolol.     He denies any symptoms of palpitations, irregular heartbeat sensation, frequent lightheadedness, presyncope or syncope.  He has just started wearing the Zio patch 2 days ago.    PAST MEDICAL HISTORY:  Past Medical History:   Diagnosis Date     Adhesive capsulitis of left shoulder 4/13/2019     Atrial fibrillation (H)      History of alcohol abuse      Hypertension      MEDICAL HISTORY OF -     cardioversion X 2     Other primary cardiomyopathies     Cardiomyopathy     Peyronie's disease        CURRENT MEDICATIONS:  Current Outpatient Medications   Medication Sig Dispense Refill     aspirin 81 MG tablet Take 1 tablet by mouth daily.       atorvastatin (LIPITOR) 40 MG tablet Take 1 tablet (40 mg) by mouth daily for cholesterol Pharmacy ok to hold prescription until due 90 tablet 3     CARTIA  MG 24 hr capsule Take 1 capsule by mouth once daily 90 capsule 0     Cholecalciferol (VITAMIN D) 400 UNITS capsule Take 1 capsule by mouth daily.       fish oil-omega-3 fatty acids (FISH OIL) 1000 MG capsule Take 2 g by mouth daily.       lisinopril (ZESTRIL) 2.5 MG tablet Take 1 tablet (2.5 mg) by mouth daily 90 tablet 1     metoprolol succinate ER (TOPROL-XL) 100 MG 24 hr tablet Take 1 tablet (100 mg) by mouth daily 90 tablet 0     MULTI-VITAMIN OR TABS 1 TABLET DAILY       sildenafil (REVATIO) 20 MG tablet TAKE ONE TO TWO TABLETS BY MOUTH ONCE DAILY AS NEEDED FOR  ED,  NEVER  USE  WITH  NITROGLYCERIN,  TERAZOSIN  OR  DOXAZOSIN  20 tablet 3     warfarin ANTICOAGULANT (COUMADIN) 5 MG tablet TAKE 2 TABLETS (10MG) BY MOUTH ON THURSDAYS AND 1 AND 1/2 TABLETS (7.5MG) ON THE REST OF THE WEEK DAYS AS DIRECTED 138 tablet 0       PAST SURGICAL HISTORY:  Past Surgical History:   Procedure Laterality Date     APPENDECTOMY         ALLERGIES:     Allergies   Allergen Reactions     Aspartame        FAMILY HISTORY:  Family History   Problem Relation Age of Onset     Blood Disease Mother         blood clots     Cancer Father         stomach     Alcohol/Drug Father         smoker     Heart Disease Brother        SOCIAL HISTORY:  Social History     Tobacco Use     Smoking status: Never Smoker     Smokeless tobacco: Never Used   Substance Use Topics     Alcohol use: Yes     Comment: 3-4 beers a week     Drug use: No       ROS:  10 point ROS neg other than the symptoms noted above in the HPI.    Labs:  Reviewed.       Assessment and Plan:   Permanent atrial fibrillation  Nonischemic cardiomyopathy-ejection fraction 40 to 45%, left atrial volume index 45    I discussed with patient his complex medical situation.  I recommended that he should go back to his original dose of metoprolol XL at 100 mg once daily.  Patient wear the current Zio patch for a total duration of 2 weeks.    I will see patient again in 2 weeks and consider increasing the dose of lisinopril.    Subsequently, I will see him again in another 2 weeks to discuss the results of the Zio patch.  Depending on the results of the Zio patch, we will consider other options for achieving adequate rate control    Telephone Visit Duration: 15 minutes

## 2021-02-12 ENCOUNTER — TELEPHONE (OUTPATIENT)
Dept: CARDIOLOGY | Facility: CLINIC | Age: 66
End: 2021-02-12

## 2021-02-12 NOTE — TELEPHONE ENCOUNTER
01/31/21 erica deleivered to patient, 7 day erica order next Tami Calabrese MD. ,MS,FAVALERIE,FAC,RS  Visit 2/15/01 and 03/01/21, may need a stat. Read by erica.Jeff Amador L.P.N.,Maryann judd. Dept.

## 2021-02-15 ENCOUNTER — VIRTUAL VISIT (OUTPATIENT)
Dept: CARDIOLOGY | Facility: CLINIC | Age: 66
End: 2021-02-15
Payer: COMMERCIAL

## 2021-02-15 DIAGNOSIS — I48.19 PERSISTENT ATRIAL FIBRILLATION (H): Primary | ICD-10-CM

## 2021-02-15 DIAGNOSIS — I10 HYPERTENSION GOAL BP (BLOOD PRESSURE) < 140/90: ICD-10-CM

## 2021-02-15 DIAGNOSIS — I42.0 CARDIOMYOPATHY, DILATED, NONISCHEMIC (H): ICD-10-CM

## 2021-02-15 PROCEDURE — 99213 OFFICE O/P EST LOW 20 MIN: CPT | Mod: 95 | Performed by: INTERNAL MEDICINE

## 2021-02-15 RX ORDER — LISINOPRIL 5 MG/1
5 TABLET ORAL DAILY
Qty: 30 TABLET | Refills: 3 | Status: SHIPPED | OUTPATIENT
Start: 2021-02-15 | End: 2021-06-30

## 2021-02-15 NOTE — PATIENT INSTRUCTIONS
Thank you for coming to the [facility] @ [place]; please note the following instructions:    1.  In clinic visit next Monday with Dr. Calabrese    2.  Increase lisinopril to 5 mg daily    3.  Labs in 1 week        If you have any questions regarding your visit please contact your care team:     Cardiology  Telephone Number   Tina VILLEGAS, RN  Olena RODRIGUEZ, RN   Alanna SULLIVAN, RMA  Sindy SHERMAN, RMDURGA SWAIN, LPN   [phone] (option 1)   For scheduling appts:     [phone] (select option 1)       For the Device Clinic (Pacemakers and ICD's)  RN's :  Ana Valentino   During business hours: [phone]    *After business hours:  [phone] (select option 4)      Normal test result notifications will be released via [employer] or mailed within 7 business days.  All other test results, will be communicated via telephone once reviewed by your cardiologist.    If you need a medication refill please contact your pharmacy.  Please allow 3 business days for your refill to be completed.    As always, thank you for trusting us with your health care needs!

## 2021-02-15 NOTE — PROGRESS NOTES
"Electrophysiology Clinic Telephone Visit    Cameron Mariano is a 65 year old male who is being evaluated via a billable telephone visit.      The patient has been notified of following:     \"This telephone visit will be conducted via a call between you and your physician/provider. We have found that certain health care needs can be provided without the need for a physical exam.  This service lets us provide the care you need with a short phone conversation.  If a prescription is necessary we can send it directly to your pharmacy.  If lab work is needed we can place an order for that and you can then stop by our lab to have the test done at a later time.    If during the course of the call the physician/provider feels a telephone visit is not appropriate, you will not be charged for this service.\"   Patient has given verbal consent for Telephone visit?  Yes    HPI: Purpose of visit: Follow-up of atrial fibrillation and cardiomyopathy    Mr Cameron Mariano is a 65 year old gentleman with permanent atrial fibrillation, HTN, and NICM among others who returns to clinic for management of his atrial fibrillation.He was first diagnosed with atrial fibrillation in 2007 when he was admitted to [facility] with new onset congestive heart failure and found to have cardiomyopathy with LVEF of 25-30% range. 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 With restoration of sinus rhythm and appropriate medical therapy, in 2008 his LV function normalized to 55-60%; he was noted to have bi-atrial enlargement; but no significant valvular abnormalities. He was then in sinus rhythm for more than a year until he was again found to be in atrial fibrillation in September 2009 and required DCCV. Since then he has had two more cardioversions, last in February 2011 while on Sotalol therapy. NSR only lasted a few days and he was found to be in atrial " fibrillation soon after on follow up. Thereafter, a rate control strategy was pursued. He has been on Toprol  mg once daily & Cardizem 240 mg once daily, along with warfarin for anti-coagulation.    Patient's last visit with me was 2 weeks ago.  In the last 2 weeks, patient has been taking his metoprolol 100 mg once daily consistently.  He has just sent in a 2-week Zio patch monitor and during the monitoring.  Patient was taking metoprolol succinate 100 mg once daily.  He did not report any side effects from the metoprolol.      PAST MEDICAL HISTORY:  Past Medical History:   Diagnosis Date     Adhesive capsulitis of left shoulder 4/13/2019     Atrial fibrillation (H)      History of alcohol abuse      Hypertension      MEDICAL HISTORY OF -     cardioversion X 2     Other primary cardiomyopathies     Cardiomyopathy     Peyronie's disease        CURRENT MEDICATIONS:  Current Outpatient Medications   Medication Sig Dispense Refill     aspirin 81 MG tablet Take 1 tablet by mouth daily.       atorvastatin (LIPITOR) 40 MG tablet Take 1 tablet (40 mg) by mouth daily for cholesterol Pharmacy ok to hold prescription until due 90 tablet 3     CARTIA  MG 24 hr capsule Take 1 capsule by mouth once daily 90 capsule 0     Cholecalciferol (VITAMIN D) 400 UNITS capsule Take 1 capsule by mouth daily.       fish oil-omega-3 fatty acids (FISH OIL) 1000 MG capsule Take 2 g by mouth daily.       lisinopril (ZESTRIL) 2.5 MG tablet Take 1 tablet (2.5 mg) by mouth daily 90 tablet 1     metoprolol succinate ER (TOPROL-XL) 100 MG 24 hr tablet Take 1 tablet (100 mg) by mouth daily 90 tablet 0     MULTI-VITAMIN OR TABS 1 TABLET DAILY       sildenafil (REVATIO) 20 MG tablet TAKE ONE TO TWO TABLETS BY MOUTH ONCE DAILY AS NEEDED FOR  ED,  NEVER  USE  WITH  NITROGLYCERIN,  TERAZOSIN  OR  DOXAZOSIN 20 tablet 3     warfarin ANTICOAGULANT (COUMADIN) 5 MG tablet TAKE 2 TABLETS (10MG) BY MOUTH ON THURSDAYS AND 1 AND 1/2 TABLETS (7.5MG) ON  THE REST OF THE WEEK DAYS AS DIRECTED 138 tablet 0       PAST SURGICAL HISTORY:  Past Surgical History:   Procedure Laterality Date     APPENDECTOMY         ALLERGIES:     Allergies   Allergen Reactions     Aspartame        FAMILY HISTORY:  Family History   Problem Relation Age of Onset     Blood Disease Mother         blood clots     Cancer Father         stomach     Alcohol/Drug Father         smoker     Heart Disease Brother        SOCIAL HISTORY:  Social History     Tobacco Use     Smoking status: Never Smoker     Smokeless tobacco: Never Used   Substance Use Topics     Alcohol use: Yes     Comment: 3-4 beers a week     Drug use: No       ROS:  10 point ROS neg other than the symptoms noted above in the HPI.    Labs:  Reviewed.       Assessment and Plan:   Permanent atrial fibrillation    Nonischemic cardiomyopathy-ejection fraction 40 to 45%, left atrial volume index 45    As we discussed 2 weeks ago, today we will increase lisinopril to 5 mg once daily.  I will see patient at the [place] clinic in person at about 3:30 or 4 PM next week.  Patient will also have a basic metabolic panel because of the increase in lisinopril.    All questions and concerns were addressed and patient is happy with the plan.    Telephone Visit Duration: 5 minutes

## 2021-02-15 NOTE — LETTER
"2/15/2021      RE: Cameron Mariano  [id number]  [place] [address]       Dear Colleague,    Thank you for the opportunity to participate in the care of your patient, Cameron Mariano, at the [facility]. Please see a copy of my visit note below.    Electrophysiology Clinic Telephone Visit    Cameron Mariano is a 65 year old male who is being evaluated via a billable telephone visit.      The patient has been notified of following:     \"This telephone visit will be conducted via a call between you and your physician/provider. We have found that certain health care needs can be provided without the need for a physical exam.  This service lets us provide the care you need with a short phone conversation.  If a prescription is necessary we can send it directly to your pharmacy.  If lab work is needed we can place an order for that and you can then stop by our lab to have the test done at a later time.    If during the course of the call the physician/provider feels a telephone visit is not appropriate, you will not be charged for this service.\"   Patient has given verbal consent for Telephone visit?  Yes    HPI: Purpose of visit: Follow-up of atrial fibrillation and cardiomyopathy    Mr Cameron Mariano is a 65 year old gentleman with permanent atrial fibrillation, HTN, and NICM among others who returns to clinic for management of his atrial fibrillation.He was first diagnosed with atrial fibrillation in 2007 when he was admitted to [facility] with new onset congestive heart failure and found to have cardiomyopathy with LVEF of 25-30% range. 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 With restoration of sinus rhythm and appropriate medical therapy, in 2008 his LV function normalized to 55-60%; he was noted to have bi-atrial " enlargement; but no significant valvular abnormalities. He was then in sinus rhythm for more than a year until he was again found to be in atrial fibrillation in September 2009 and required DCCV. Since then he has had two more cardioversions, last in February 2011 while on Sotalol therapy. NSR only lasted a few days and he was found to be in atrial fibrillation soon after on follow up. Thereafter, a rate control strategy was pursued. He has been on Toprol  mg once daily & Cardizem 240 mg once daily, along with warfarin for anti-coagulation.    Patient's last visit with me was 2 weeks ago.  In the last 2 weeks, patient has been taking his metoprolol 100 mg once daily consistently.  He has just sent in a 2-week Dizkono patch monitor and during the monitoring.  Patient was taking metoprolol succinate 100 mg once daily.  He did not report any side effects from the metoprolol.      PAST MEDICAL HISTORY:  Past Medical History:   Diagnosis Date     Adhesive capsulitis of left shoulder 4/13/2019     Atrial fibrillation (H)      History of alcohol abuse      Hypertension      MEDICAL HISTORY OF -     cardioversion X 2     Other primary cardiomyopathies     Cardiomyopathy     Peyronie's disease        CURRENT MEDICATIONS:  Current Outpatient Medications   Medication Sig Dispense Refill     aspirin 81 MG tablet Take 1 tablet by mouth daily.       atorvastatin (LIPITOR) 40 MG tablet Take 1 tablet (40 mg) by mouth daily for cholesterol Pharmacy ok to hold prescription until due 90 tablet 3     CARTIA  MG 24 hr capsule Take 1 capsule by mouth once daily 90 capsule 0     Cholecalciferol (VITAMIN D) 400 UNITS capsule Take 1 capsule by mouth daily.       fish oil-omega-3 fatty acids (FISH OIL) 1000 MG capsule Take 2 g by mouth daily.       lisinopril (ZESTRIL) 2.5 MG tablet Take 1 tablet (2.5 mg) by mouth daily 90 tablet 1     metoprolol succinate ER (TOPROL-XL) 100 MG 24 hr tablet Take 1 tablet (100 mg) by mouth daily 90  tablet 0     MULTI-VITAMIN OR TABS 1 TABLET DAILY       sildenafil (REVATIO) 20 MG tablet TAKE ONE TO TWO TABLETS BY MOUTH ONCE DAILY AS NEEDED FOR  ED,  NEVER  USE  WITH  NITROGLYCERIN,  TERAZOSIN  OR  DOXAZOSIN 20 tablet 3     warfarin ANTICOAGULANT (COUMADIN) 5 MG tablet TAKE 2 TABLETS (10MG) BY MOUTH ON THURSDAYS AND 1 AND 1/2 TABLETS (7.5MG) ON THE REST OF THE WEEK DAYS AS DIRECTED 138 tablet 0       PAST SURGICAL HISTORY:  Past Surgical History:   Procedure Laterality Date     APPENDECTOMY         ALLERGIES:     Allergies   Allergen Reactions     Aspartame        FAMILY HISTORY:  Family History   Problem Relation Age of Onset     Blood Disease Mother         blood clots     Cancer Father         stomach     Alcohol/Drug Father         smoker     Heart Disease Brother        SOCIAL HISTORY:  Social History     Tobacco Use     Smoking status: Never Smoker     Smokeless tobacco: Never Used   Substance Use Topics     Alcohol use: Yes     Comment: 3-4 beers a week     Drug use: No       ROS:  10 point ROS neg other than the symptoms noted above in the HPI.    Labs:  Reviewed.       Assessment and Plan:   Permanent atrial fibrillation    Nonischemic cardiomyopathy-ejection fraction 40 to 45%, left atrial volume index 45    As we discussed 2 weeks ago, today we will increase lisinopril to 5 mg once daily.  I will see patient at the [facility] in person at about 3:30 or 4 PM next week.  Patient will also have a basic metabolic panel because of the increase in lisinopril.    All questions and concerns were addressed and patient is happy with the plan.    Telephone Visit Duration: 5 minutes        Please do not hesitate to contact me if you have any questions/concerns.     Sincerely,     Tami Calabrese MD

## 2021-02-18 NOTE — TELEPHONE ENCOUNTER
02/18/21 zio suite rep. Notified to expedite zio result for 02/22/21 Tami Calabrese MD. ,MS,FAVALERIE,FACC,RS visit .Jeff Amador L.P.N.,Maryann judd. Dept.

## 2021-02-22 ENCOUNTER — OFFICE VISIT (OUTPATIENT)
Dept: CARDIOLOGY | Facility: CLINIC | Age: 66
End: 2021-02-22
Payer: COMMERCIAL

## 2021-02-22 ENCOUNTER — ANTICOAGULATION THERAPY VISIT (OUTPATIENT)
Dept: FAMILY MEDICINE | Facility: CLINIC | Age: 66
End: 2021-02-22

## 2021-02-22 VITALS
HEART RATE: 85 BPM | BODY MASS INDEX: 28.55 KG/M2 | SYSTOLIC BLOOD PRESSURE: 115 MMHG | OXYGEN SATURATION: 97 % | WEIGHT: 199 LBS | DIASTOLIC BLOOD PRESSURE: 71 MMHG

## 2021-02-22 DIAGNOSIS — I42.0 CARDIOMYOPATHY, DILATED, NONISCHEMIC (H): ICD-10-CM

## 2021-02-22 DIAGNOSIS — I48.20 CHRONIC ATRIAL FIBRILLATION (H): ICD-10-CM

## 2021-02-22 DIAGNOSIS — I48.19 PERSISTENT ATRIAL FIBRILLATION (H): Primary | ICD-10-CM

## 2021-02-22 DIAGNOSIS — I42.8 CARDIOMYOPATHY, NONISCHEMIC (H): ICD-10-CM

## 2021-02-22 DIAGNOSIS — I48.19 PERSISTENT ATRIAL FIBRILLATION (H): ICD-10-CM

## 2021-02-22 DIAGNOSIS — Z79.01 LONG TERM CURRENT USE OF ANTICOAGULANT THERAPY: ICD-10-CM

## 2021-02-22 DIAGNOSIS — I10 HYPERTENSION GOAL BP (BLOOD PRESSURE) < 140/90: ICD-10-CM

## 2021-02-22 LAB — INR PPP: 3.7 (ref 0.86–1.14)

## 2021-02-22 PROCEDURE — 36415 COLL VENOUS BLD VENIPUNCTURE: CPT | Performed by: FAMILY MEDICINE

## 2021-02-22 PROCEDURE — 93000 ELECTROCARDIOGRAM COMPLETE: CPT | Performed by: INTERNAL MEDICINE

## 2021-02-22 PROCEDURE — 85610 PROTHROMBIN TIME: CPT | Performed by: FAMILY MEDICINE

## 2021-02-22 PROCEDURE — 99214 OFFICE O/P EST MOD 30 MIN: CPT | Mod: GC | Performed by: INTERNAL MEDICINE

## 2021-02-22 PROCEDURE — 80048 BASIC METABOLIC PNL TOTAL CA: CPT | Performed by: FAMILY MEDICINE

## 2021-02-22 RX ORDER — DABIGATRAN ETEXILATE 75 MG/1
75 CAPSULE ORAL 2 TIMES DAILY
Qty: 60 CAPSULE | Refills: 0 | Status: SHIPPED | OUTPATIENT
Start: 2021-02-22 | End: 2021-04-06 | Stop reason: ALTCHOICE

## 2021-02-22 NOTE — PATIENT INSTRUCTIONS
Thank you for coming to the [facility] [place]; please note the following instructions:    1. You are scheduled for an Atrial Fibrillation Ablation, at [facility] [place], with Dr. Calabrese. The hospital is located at [address] [place] [address] on the [place] of the [place].     **Medication Change: 9 days prior to the ablation you will STOP Warfarin.  7 days before the ablation you will then start Pradaxa 150mg twice a day. You will be given a 30 day supply. Continue taking until you run out, and then switch back to warfarin. A 30 day voucher will be called into your pharmacy.    You will need to undergo a COVID-19 PCR swab test within 4 days of procedure. You will receive a phone call with more information. If you do not hear from the COVID scheduling team, please call: [phone] to schedule.    Pre-Anesthesia Phone Call will occur 1-2 days prior to procedure date.  You do not need to come to the [place].    Please disregard any automated, reminder phone calls regarding arrival times. Follow the below arrival times.       Date:   OG (transesophageal echocardiogram), CT Angiogram  _______: Arrive to the [facility] Waiting Room for your: CT & OG.    -If there is evidence of a clot in your heart on the OG,  the procedure will be canceled.     OG Instructions:  1. Nothing to eat or drink 6 hours prior.  2. No additional medication instructions.  3. You will receive sedation for the OG so you will need someone to drive you home from the hospital and stay with you for 6 hours. Do not make any legal decisions for 24 hours.     CT Angiogram Instructions:  1. Nothing to eat or drink except water 3 hours prior.  2. Avoid caffeine, smoking, or strenuous activity on the day of the test.  3. You will receive contrast, so plan to drink extra water the day before and after your test.  4. If you take any of the following medications, please HOLD the day of:   * Metformin or  Glucophage the morning of and wait 48 hours before re-starting   * Diuretic the day of. (Examples: Furosemide (Lasix), Torsemide, Bumetanide (Bumex), Metolazone (Zaroxolyn) and Hydrochlorothiazide, Spironolactone)    * NSAIDS (Examples: ibuprofen/Advil/Motrin, naproxen/Aleve) the day of.   * Erectile dysfunction medications (Examples: Viagra, Cialis, Levitra).  5. Please allow 1 hour of time for preparation and testing. You are on the table for ~15 minutes. Your feet go in first, your head is not enclosed.        Date:   Time: ________________to [address] at the [facility]  Atrial Fibrillation Ablation Procedure     1. Please review the attached instructions on showering before your procedure at the end of this letter.  2. Your history and physical will be completed by our nurse practitioner when you arrive.  3. Please do not eat anything for 8 hours prior to your procedure. You may have sips of water up until 2 hours prior to your arrival.  4. The morning of your procedure, you may take your scheduled medications with a sip of water - continue your Pradaxa and take your AM dose before you come to the hospital.  5. You will receive general anesthesia for this procedure.   6. You will likely discharge the same day and need a .      Post-Procedure Instructions  Care of groin site:    Remove the Band-Aid after 24 hours. If there is minor oozing, apply another Band-aid and remove it after 12 hours.     Do NOT take a bath, use a hot tub, pool, or submerse in water for at least 3 days. You may shower.     It is normal to have a small bruise or lump at the site.    Do not scrub the site.    Do not use lotion or powder near the puncture site for 3 days.    If you start bleeding from the site in your groin: Lie down flat and press firmly on the site. Call your physician immediately, or, come to the emergency room.  Call 911 right away if you have bleeding that is heavy or does not stop.    Call your doctor/provider  if:     You have a large or growing hard lump around the site.     The site is red, swollen, hot or tender.     Blood or fluid is draining from the site.     You have chills or a fever greater than 101 F (38 C).     Your leg or arm turns bluish, feels numb or cool.     You have hives, a rash or unusual itching.      Activity Restrictions    For the first 2 days: Do not stoop or squat. When you cough, sneeze or move your bowels, hold your hand over the puncture site and press gently.    Do not lift more than 10 pounds or exertional activity for 10 days.  - No driving for 24 hours after (with or without general anesthesia).     Date               2 week zio    Date: _______ Follow up with Dr. Calabrese            Please do not hesitate to utilize [employer] or call us if you have any questions or concerns.    Tina Bolanos RN  Electrophysiology Nurse Coordinator  [phone]    [facility] Procedure   [phone]      Showering Before Surgery   Your surgeon has asked you to take 2 showers before surgery.  Why is this important?  It is normal for bacteria (germs) to be on your skin. The skin protects us from these germs. When you have surgery, we cut the skin. Sometimes germs get into the cuts and cause infection (illness caused by germs). By following the instructions below and using special soap, you will lower the number of germs on your skin. This decreases your chance of infection.  Special soap  Buy or get 8 ounces of antiseptic surgical soap called 4% CHG. Common name brands of this soap are Hibiclens and Exidine.   You can find it at your local pharmacy, clinic or retail store. If you have trouble, ask your pharmacist to help you find the right substitute.   A note about shaving:  Do not shave within 12 inches of your incision (surgical cut) area for at least 3 days before surgery. Shaving can make small cuts in the skin. This puts you at a higher risk of infection.  Items you will need for each  shower:    1 newly washed towel    4 ounces of one of the above soaps  Follow these instructions:  The evening before surgery   1. Wash your hair and body with your regular shampoo and soap. Make sure you rinse the shampoo and soap from your hair and body.   2. Using clean hands, apply about 2 ounces of soap gently on your skin from the neck to your toes. Use on your groin area last. Do not use this soap on your face or head. If you get any soap in your eyes, ears or mouth, rinse right away.   3. Repeat step 2. It is very important to let the soap stay on your skin for at least 1 minute.   4. Rinse well and dry off using a clean towel.If you feel any tingling, itching or other irritation, rinse right away. It is normal to feel some coolness on the skin after using the antiseptic soap. Your skin may feel a bit dry after the shower, but do not use any lotions, creams or moisturizers. Do not use hair spray or other products in your hair.  5. Dress in freshly washed clothes or pajamas. Use fresh pillowcases and sheets on your bed.    The morning of surgery  1. Wash your hair and body with your regular shampoo and soap. Make sure you rinse the shampoo and soap from your hair and body.   2. Using clean hands, apply about 2 ounces of soap gently on your skin from the neck to your toes. Use on your groin area last. Do not use this soap on your face or head. If you get any soap in your eyes, ears or mouth, rinse right away.   3. Repeat step 2. It is very important to let the soap stay on your skin for at least 1 minute.   4. Rinse well and dry off using a clean towel.If you feel any tingling, itching or other irritation, rinse right away. It is normal to feel some coolness on the skin after using the antiseptic soap. Your skin may feel a bit dry after the shower, but do not use any lotions, creams or moisturizers. Do not use hair spray or other products in your hair.  5. Dress in clean clothes.  If you have any questions  about showering or an allergy to CHG soap, please call the Preadmissions Nursing Department at the hospital where you are having your surgery.  [facility] [place] ([place]): [phone]  This phone number will be answered between the hours of 8:00 a.m. and 6:30 p.m. Monday through Friday.                  If you have any questions regarding your visit please contact your care team:     Cardiology  Telephone Number   Tina VILLEGAS, RN  Olena RODRIGUEZ, RN   Alanna SULLIVAN, PIERRE SHERMAN, PIERRE SWAIN, LPN   [phone] (option 1)   For scheduling appts:     [phone] (select option 1)       For the Device Clinic (Pacemakers and ICD's)  RN's :  Ana Valentino   During business hours: [phone]    *After business hours:  [phone] (select option 4)      Normal test result notifications will be released via [employer] or mailed within 7 business days.  All other test results, will be communicated via telephone once reviewed by your cardiologist.    If you need a medication refill please contact your pharmacy.  Please allow 3 business days for your refill to be completed.    As always, thank you for trusting us with your health care needs!

## 2021-02-22 NOTE — LETTER
2/22/2021      RE: Cameron Mariano  [address]  [facility] [address]       Dear Colleague,    Thank you for the opportunity to participate in the care of your patient, Cameron Mariano, at the [facility]. Please see a copy of my visit note below.    HPI:   Mr Cameron Mariano is a 65 year old gentleman with permanent atrial fibrillation, HTN, and NICM among others who returns to clinic for management of his atrial fibrillation.    He was first diagnosed with atrial fibrillation in 2007 when he was admitted to [facility] with new onset congestive heart failure and found to have cardiomyopathy with LVEF of 25-30% range. 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 With restoration of sinus rhythm and appropriate medical therapy, in 2008 his LV function normalized to 55-60%; he was noted to have bi-atrial enlargement; but no significant valvular abnormalities. He was then in sinus rhythm for more than a year until he was again found to be in atrial fibrillation in September 2009 and required DCCV. Since then he has had two more cardioversions, last in February 2011 while on Sotalol therapy. NSR only lasted a few days and he was found to be in atrial fibrillation soon after on follow up. Thereafter, a rate control strategy was pursued. He has been on Toprol  mg once daily & Cardizem 240 mg once daily, along with warfarin for anti-coagulation.     Patient has been seen in clinic virtually several times in the last month. Originally he was seen 1/18 with Zio results showing average heart rate of 97 bpm. At that time, we recommended increasing metoprolol to 150 mg once daily to achieve better rate control. However, he noticed worsening fatigue and generalized tiredness which he believes is related to the higher dose of metoprolol. At  that time, metoprolol was decreased to 100mg daily and a zio patch was repeated (with him taking his doses consistently). He returns today for discussion after results of that Zio patch showed average heart rate of 74 bpm.     Today, he continues to have fatigue and generalized tiredness which he attributes to the medication. The symptoms are somewhat better than before, but still bothersome. He denies any symptoms of palpitations, irregular heartbeat sensation, frequent lightheadedness, presyncope or syncope.  He otherwise denies chest pain or pressure.    PAST MEDICAL HISTORY:  Past Medical History:   Diagnosis Date     Adhesive capsulitis of left shoulder 4/13/2019     Atrial fibrillation (H)      History of alcohol abuse      Hypertension      MEDICAL HISTORY OF -     cardioversion X 2     Other primary cardiomyopathies     Cardiomyopathy     Peyronie's disease        CURRENT MEDICATIONS:  Current Outpatient Medications   Medication Sig Dispense Refill     aspirin 81 MG tablet Take 1 tablet by mouth daily.       atorvastatin (LIPITOR) 40 MG tablet Take 1 tablet (40 mg) by mouth daily for cholesterol Pharmacy ok to hold prescription until due 90 tablet 3     CARTIA  MG 24 hr capsule Take 1 capsule by mouth once daily 90 capsule 0     Cholecalciferol (VITAMIN D) 400 UNITS capsule Take 1 capsule by mouth daily.       fish oil-omega-3 fatty acids (FISH OIL) 1000 MG capsule Take 2 g by mouth daily.       lisinopril (ZESTRIL) 5 MG tablet Take 1 tablet (5 mg) by mouth daily 30 tablet 3     metoprolol succinate ER (TOPROL-XL) 100 MG 24 hr tablet Take 1 tablet (100 mg) by mouth daily 90 tablet 0     MULTI-VITAMIN OR TABS 1 TABLET DAILY       sildenafil (REVATIO) 20 MG tablet TAKE ONE TO TWO TABLETS BY MOUTH ONCE DAILY AS NEEDED FOR  ED,  NEVER  USE  WITH  NITROGLYCERIN,  TERAZOSIN  OR  DOXAZOSIN 20 tablet 3     warfarin ANTICOAGULANT (COUMADIN) 5 MG tablet TAKE 2 TABLETS (10MG) BY MOUTH ON THURSDAYS AND 1 AND 1/2  TABLETS (7.5MG) ON THE REST OF THE WEEK DAYS AS DIRECTED 138 tablet 0       PAST SURGICAL HISTORY:  Past Surgical History:   Procedure Laterality Date     APPENDECTOMY         ALLERGIES:     Allergies   Allergen Reactions     Aspartame        FAMILY HISTORY:  I have reviewed this patient's family history and updated it with pertinent information if needed.  Family History   Problem Relation Age of Onset     Blood Disease Mother         blood clots     Cancer Father         stomach     Alcohol/Drug Father         smoker     Heart Disease Brother        SOCIAL HISTORY:  Social History     Tobacco Use     Smoking status: Never Smoker     Smokeless tobacco: Never Used   Substance Use Topics     Alcohol use: Yes     Comment: 3-4 beers a week     Drug use: No       ROS:   Constitutional: No fever, chills, or sweats. Weight stable.   ENT: No visual disturbance, ear ache, epistaxis, sore throat.   Cardiovascular: As per HPI.   Respiratory: No cough, hemoptysis.    GI: No nausea, vomiting, hematemesis, melena, or hematochezia.   : No hematuria.   Integument: Negative.   Psychiatric: Negative.   Hematologic:  Denies easy bruising, no easy bleeding.  Neuro: Negative.   Endocrinology: No significant heat or cold intolerance   Musculoskeletal: No myalgia.    Exam:  /71 (BP Location: Left arm, Patient Position: Chair, Cuff Size: Adult Regular)   Pulse 85   Wt 90.3 kg (199 lb)   SpO2 97%   BMI 28.55 kg/m    GENERAL APPEARANCE: healthy, alert and no distress  HEENT: no icterus, no xanthelasmas, normal pupil size   NECK: no asymmetry, masses, or scars, JVP not elevated  RESPIRATORY: lungs clear to auscultation - no rales, rhonchi or wheezes, no use of accessory muscles, no retractions, respirations are unlabored, normal respiratory rate  CARDIOVASCULAR: irregularly irregular rhythm, normal S1 with physiologic split S2, no S3 or S4 and no murmur, click or rub, precordium quiet with normal PMI.  ABDOMEN: soft, non tender,  no masses palpable, bowel sounds normal  EXTREMITIES: peripheral pulses normal, no edema  NEURO: alert and oriented to person/place/time, normal speech, gait and affect  VASC: Radial pulses are normal in volumes and symmetric bilaterally.   SKIN: no ecchymoses, no rashes    Labs:  CBC RESULTS:   Lab Results   Component Value Date    WBC 10.0 09/01/2020    RBC 4.35 (L) 09/01/2020    HGB 14.0 09/01/2020    HCT 41.3 09/01/2020    MCV 95 09/01/2020    MCH 32.2 09/01/2020    MCHC 33.9 09/01/2020    RDW 14.3 09/01/2020     09/01/2020       BMP RESULTS:  Lab Results   Component Value Date     09/01/2020    POTASSIUM 4.0 09/01/2020    CHLORIDE 107 09/01/2020    CO2 26 09/01/2020    ANIONGAP 7 09/01/2020     (H) 09/01/2020    BUN 15 09/01/2020    CR 0.73 09/01/2020    GFRESTIMATED >90 09/01/2020    GFRESTBLACK >90 09/01/2020    IRIS 8.6 09/01/2020        INR RESULTS:  Lab Results   Component Value Date    INR 2.10 (H) 01/22/2021    INR 2.50 (H) 12/16/2020    INR 2.10 (H) 11/20/2020    INR 1.50 (H) 11/13/2020       Procedures:  TTE 10/30/20  Interpretation Summary     The Ejection Fraction is estimated at 40-45%.  LVEF 41% based on biplane 2D tracing.  Mild left ventricular dilation is present.  Right ventricular function, chamber size, wall motion, and thickness are  normal.  Pulmonary artery systolic pressure is normal.  The inferior vena cava is normal.  No pericardial effusion is present.  There is no prior study for direct comparison.    Assessment and Plan:   Mr Cameron Mariano is a 64yo M with permanent atrial fibrillation. He remains asymptomatic, however recently has worsening of his LV systolic function (LVEF 40-45%) as shown on recent echocardiogram. Additionally, he has had persistent symptoms of fatigue and tiredness associated with metoprolol, although he has achieved adequate rate control. Given his mildly reduced LV systolic function and his medication side-effects, we have offered an attempt  at ablation.     #Permanent Atrial Fibrillation, [id number] = 2  #NICM, presumably tachycardia mediated  #HTN  Rate control: continue metoprolol to 100mg daily, continue diltiazem 240mg daily.  Anticoagulation: continue warfarin for now      After discussion of risks and benefits, the patient has elected to pursue an atrial fibrillation ablation. A thorough discussion was held, and all questions were answered to his satisfaction. We will schedule him for an atrial fibrillation ablation at the next availability.    Per protocol:  Patient will switch from warfarin to pradaxa for 1 week prior to the procedure.  He will undergo cardiac CT and OG 1 day prior to the procedure.  Ablation will be performed under general anesthesia.        I explained to patient that risks of EP study and ablation procedure include, but are not limited to vascular injury, excessive bleeding requiring blood transfusion, aortic injury, cardiac tamponade requiring pericardiocentesis or open heart surgery, TIA, stroke, esophageal injury, pulmonary vein stenosis or death. Patient understood the risks and decided to proceed with procedure.    I have seen, interviewed, and examined patient. I have reviewed the laboratory tests, imaging, and other investigations. I have reviewed the management plan with the patient. I discussed with the team and agree with the findings and plan in this resident/fellow/nurse practitioner's note. In addition, changes in the physical examination, assessment and plan have been incorporated into the note by myself, as to make it a single cohesive document.       Tmai Calabrese MD, MS  Cardiology/Cardiac EP Attending Staff    CC  Patient Care Team:  Pablo Benz MD as PCP - General (Family Practice)  Tami Calabrese MD as Assigned Heart and Vascular Provider

## 2021-02-22 NOTE — PROGRESS NOTES
HPI:   Mr Cameron Mariano is a 65 year old gentleman with permanent atrial fibrillation, HTN, and NICM among others who returns to clinic for management of his atrial fibrillation.    He was first diagnosed with atrial fibrillation in 2007 when he was admitted to [facility] with new onset congestive heart failure and found to have cardiomyopathy with LVEF of 25-30% range. 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 With restoration of sinus rhythm and appropriate medical therapy, in 2008 his LV function normalized to 55-60%; he was noted to have bi-atrial enlargement; but no significant valvular abnormalities. He was then in sinus rhythm for more than a year until he was again found to be in atrial fibrillation in September 2009 and required DCCV. Since then he has had two more cardioversions, last in February 2011 while on Sotalol therapy. NSR only lasted a few days and he was found to be in atrial fibrillation soon after on follow up. Thereafter, a rate control strategy was pursued. He has been on Toprol  mg once daily & Cardizem 240 mg once daily, along with warfarin for anti-coagulation.     Patient has been seen in clinic virtually several times in the last month. Originally he was seen 1/18 with Zio results showing average heart rate of 97 bpm. At that time, we recommended increasing metoprolol to 150 mg once daily to achieve better rate control. However, he noticed worsening fatigue and generalized tiredness which he believes is related to the higher dose of metoprolol. At that time, metoprolol was decreased to 100mg daily and a zio patch was repeated (with him taking his doses consistently). He returns today for discussion after results of that Zio patch showed average heart rate of 74 bpm.     Today, he continues to have fatigue and generalized tiredness which he attributes to the medication. The  symptoms are somewhat better than before, but still bothersome. He denies any symptoms of palpitations, irregular heartbeat sensation, frequent lightheadedness, presyncope or syncope.  He otherwise denies chest pain or pressure.    PAST MEDICAL HISTORY:  Past Medical History:   Diagnosis Date     Adhesive capsulitis of left shoulder 4/13/2019     Atrial fibrillation (H)      History of alcohol abuse      Hypertension      MEDICAL HISTORY OF -     cardioversion X 2     Other primary cardiomyopathies     Cardiomyopathy     Peyronie's disease        CURRENT MEDICATIONS:  Current Outpatient Medications   Medication Sig Dispense Refill     aspirin 81 MG tablet Take 1 tablet by mouth daily.       atorvastatin (LIPITOR) 40 MG tablet Take 1 tablet (40 mg) by mouth daily for cholesterol Pharmacy ok to hold prescription until due 90 tablet 3     CARTIA  MG 24 hr capsule Take 1 capsule by mouth once daily 90 capsule 0     Cholecalciferol (VITAMIN D) 400 UNITS capsule Take 1 capsule by mouth daily.       fish oil-omega-3 fatty acids (FISH OIL) 1000 MG capsule Take 2 g by mouth daily.       lisinopril (ZESTRIL) 5 MG tablet Take 1 tablet (5 mg) by mouth daily 30 tablet 3     metoprolol succinate ER (TOPROL-XL) 100 MG 24 hr tablet Take 1 tablet (100 mg) by mouth daily 90 tablet 0     MULTI-VITAMIN OR TABS 1 TABLET DAILY       sildenafil (REVATIO) 20 MG tablet TAKE ONE TO TWO TABLETS BY MOUTH ONCE DAILY AS NEEDED FOR  ED,  NEVER  USE  WITH  NITROGLYCERIN,  TERAZOSIN  OR  DOXAZOSIN 20 tablet 3     warfarin ANTICOAGULANT (COUMADIN) 5 MG tablet TAKE 2 TABLETS (10MG) BY MOUTH ON THURSDAYS AND 1 AND 1/2 TABLETS (7.5MG) ON THE REST OF THE WEEK DAYS AS DIRECTED 138 tablet 0       PAST SURGICAL HISTORY:  Past Surgical History:   Procedure Laterality Date     APPENDECTOMY         ALLERGIES:     Allergies   Allergen Reactions     Aspartame        FAMILY HISTORY:  I have reviewed this patient's family history and updated it with  pertinent information if needed.  Family History   Problem Relation Age of Onset     Blood Disease Mother         blood clots     Cancer Father         stomach     Alcohol/Drug Father         smoker     Heart Disease Brother        SOCIAL HISTORY:  Social History     Tobacco Use     Smoking status: Never Smoker     Smokeless tobacco: Never Used   Substance Use Topics     Alcohol use: Yes     Comment: 3-4 beers a week     Drug use: No       ROS:   Constitutional: No fever, chills, or sweats. Weight stable.   ENT: No visual disturbance, ear ache, epistaxis, sore throat.   Cardiovascular: As per HPI.   Respiratory: No cough, hemoptysis.    GI: No nausea, vomiting, hematemesis, melena, or hematochezia.   : No hematuria.   Integument: Negative.   Psychiatric: Negative.   Hematologic:  Denies easy bruising, no easy bleeding.  Neuro: Negative.   Endocrinology: No significant heat or cold intolerance   Musculoskeletal: No myalgia.    Exam:  /71 (BP Location: Left arm, Patient Position: Chair, Cuff Size: Adult Regular)   Pulse 85   Wt 90.3 kg (199 lb)   SpO2 97%   BMI 28.55 kg/m    GENERAL APPEARANCE: healthy, alert and no distress  HEENT: no icterus, no xanthelasmas, normal pupil size   NECK: no asymmetry, masses, or scars, JVP not elevated  RESPIRATORY: lungs clear to auscultation - no rales, rhonchi or wheezes, no use of accessory muscles, no retractions, respirations are unlabored, normal respiratory rate  CARDIOVASCULAR: irregularly irregular rhythm, normal S1 with physiologic split S2, no S3 or S4 and no murmur, click or rub, precordium quiet with normal PMI.  ABDOMEN: soft, non tender, no masses palpable, bowel sounds normal  EXTREMITIES: peripheral pulses normal, no edema  NEURO: alert and oriented to person/place/time, normal speech, gait and affect  VASC: Radial pulses are normal in volumes and symmetric bilaterally.   SKIN: no ecchymoses, no rashes    Labs:  CBC RESULTS:   Lab Results   Component Value  Date    WBC 10.0 09/01/2020    RBC 4.35 (L) 09/01/2020    HGB 14.0 09/01/2020    HCT 41.3 09/01/2020    MCV 95 09/01/2020    MCH 32.2 09/01/2020    MCHC 33.9 09/01/2020    RDW 14.3 09/01/2020     09/01/2020       BMP RESULTS:  Lab Results   Component Value Date     09/01/2020    POTASSIUM 4.0 09/01/2020    CHLORIDE 107 09/01/2020    CO2 26 09/01/2020    ANIONGAP 7 09/01/2020     (H) 09/01/2020    BUN 15 09/01/2020    CR 0.73 09/01/2020    GFRESTIMATED >90 09/01/2020    GFRESTBLACK >90 09/01/2020    IRIS 8.6 09/01/2020        INR RESULTS:  Lab Results   Component Value Date    INR 2.10 (H) 01/22/2021    INR 2.50 (H) 12/16/2020    INR 2.10 (H) 11/20/2020    INR 1.50 (H) 11/13/2020       Procedures:  TTE 10/30/20  Interpretation Summary     The Ejection Fraction is estimated at 40-45%.  LVEF 41% based on biplane 2D tracing.  Mild left ventricular dilation is present.  Right ventricular function, chamber size, wall motion, and thickness are  normal.  Pulmonary artery systolic pressure is normal.  The inferior vena cava is normal.  No pericardial effusion is present.  There is no prior study for direct comparison.    Assessment and Plan:   Mr Cameron Mariano is a 64yo M with permanent atrial fibrillation. He remains asymptomatic, however recently has worsening of his LV systolic function (LVEF 40-45%) as shown on recent echocardiogram. Additionally, he has had persistent symptoms of fatigue and tiredness associated with metoprolol, although he has achieved adequate rate control. Given his mildly reduced LV systolic function and his medication side-effects, we have offered an attempt at ablation.     #Permanent Atrial Fibrillation, [id number] = 2  #NICM, presumably tachycardia mediated  #HTN  Rate control: continue metoprolol to 100mg daily, continue diltiazem 240mg daily.  Anticoagulation: continue warfarin for now      After discussion of risks and benefits, the patient has elected to pursue an atrial  fibrillation ablation. A thorough discussion was held, and all questions were answered to his satisfaction. We will schedule him for an atrial fibrillation ablation at the next availability.    Per protocol:  Patient will switch from warfarin to pradaxa for 1 week prior to the procedure.  He will undergo cardiac CT and OG 1 day prior to the procedure.  Ablation will be performed under general anesthesia.        I explained to patient that risks of EP study and ablation procedure include, but are not limited to vascular injury, excessive bleeding requiring blood transfusion, aortic injury, cardiac tamponade requiring pericardiocentesis or open heart surgery, TIA, stroke, esophageal injury, pulmonary vein stenosis or death. Patient understood the risks and decided to proceed with procedure.    I have seen, interviewed, and examined patient. I have reviewed the laboratory tests, imaging, and other investigations. I have reviewed the management plan with the patient. I discussed with the team and agree with the findings and plan in this resident/fellow/nurse practitioner's note. In addition, changes in the physical examination, assessment and plan have been incorporated into the note by myself, as to make it a single cohesive document.       Tami Calabrese MD, MS  Cardiology/Cardiac EP Attending Staff                  CC  Patient Care Team:  Pablo Benz MD as PCP - General (Family Practice)  Pablo Benz MD as Assigned PCP  Tami Calabrese MD as Assigned Heart and Vascular Provider

## 2021-02-22 NOTE — NURSING NOTE
"Chief Complaint   Patient presents with     RECHECK     1 week follow up.        Initial /71 (BP Location: Left arm, Patient Position: Chair, Cuff Size: Adult Regular)   Pulse 85   Wt 90.3 kg (199 lb)   SpO2 97%   BMI 28.55 kg/m   Estimated body mass index is 28.55 kg/m  as calculated from the following:    Height as of 5/31/19: 1.778 m (5' 10\").    Weight as of this encounter: 90.3 kg (199 lb)..  BP completed using cuff size: regular    Sindy White MA  "

## 2021-02-23 ENCOUNTER — TELEPHONE (OUTPATIENT)
Dept: CARDIOLOGY | Facility: CLINIC | Age: 66
End: 2021-02-23

## 2021-02-23 LAB
ANION GAP SERPL CALCULATED.3IONS-SCNC: 3 MMOL/L (ref 3–14)
BUN SERPL-MCNC: 18 MG/DL (ref 7–30)
CALCIUM SERPL-MCNC: 9.1 MG/DL (ref 8.5–10.1)
CHLORIDE SERPL-SCNC: 108 MMOL/L (ref 94–109)
CO2 SERPL-SCNC: 31 MMOL/L (ref 20–32)
CREAT SERPL-MCNC: 0.84 MG/DL (ref 0.66–1.25)
GFR SERPL CREATININE-BSD FRML MDRD: >90 ML/MIN/{1.73_M2}
GLUCOSE SERPL-MCNC: 84 MG/DL (ref 70–99)
POTASSIUM SERPL-SCNC: 4.5 MMOL/L (ref 3.4–5.3)
SODIUM SERPL-SCNC: 142 MMOL/L (ref 133–144)

## 2021-02-23 NOTE — PROGRESS NOTES
Anticoagulation Management    Unable to reach Néstor today.    Today's INR result of 3.7 is supratherapeutic (goal INR of 2.0-3.0).  Result received from: Clinic Lab    Follow up required to confirm warfarin dose taken and assess for changes    Left message to hold warfarin tonight.      Anticoagulation clinic to follow up    Otilia Wagoner RN

## 2021-02-23 NOTE — PROGRESS NOTES
ANTICOAGULATION MANAGEMENT     Patient Name:  Cameron Mariano  Date:  2/22/2021    ASSESSMENT /SUBJECTIVE:    Today's INR result of 3.7 is supratherapeutic. Goal INR of 2.0-3.0      Warfarin dose taken: More warfarin taken than planned which may be affecting INR    Diet: No new diet changes affecting INR    Medication changes/ interactions: No new medications/supplements affecting INR    Previous INR: Therapeutic     S/S of bleeding or thromboembolism: No    New injury or illness: No    Upcoming surgery, procedure or cardioversion: No  Additional findings: Patient saw cardiology today. He will be having an Ablation coming up (not scheduled yet). Per notes, patient will switch from warfarin to pradaxa for 1 week prior to the procedure.    PLAN:    Telephone call with Cameron regarding INR result and instructed:     Warfarin Dosing Instructions: 2.5 mg reduced dose tomorrow AM then continue your current warfarin dose of 10 mg every Thu; 7.5 mg all other days (Patient takes Coumadin in AM)    Instructed patient to follow up no later than: 2 weeks  Patient will call once Ablation scheduled    Education provided: Monitoring for bleeding signs and symptoms and Monitoring for clotting signs and symptoms      Néstor verbalizes understanding and agrees to warfarin dosing plan.    Instructed to call the Anticoagulation Clinic for any changes, questions or concerns. (#[phone])        Otilia Wagoner, ESTEBAN      OBJECTIVE:  Recent labs: (last 7 days)     02/22/21  1714   INR 3.70*         No question data found.  Anticoagulation Summary  As of 2/22/2021    INR goal:  2.0-3.0   TTR:  83.4 % (1 y)   INR used for dosing:  3.70 (2/22/2021)   Warfarin maintenance plan:  10 mg (5 mg x 2) every Thu; 7.5 mg (5 mg x 1.5) all other days   Full warfarin instructions:  2/23: 2.5 mg; Otherwise 10 mg every Thu; 7.5 mg all other days   Weekly warfarin total:  55 mg   Plan last modified:  Franchesca Jarvis RN (5/31/2018)   Next INR check:   3/12/2021   Priority:  Maintenance   Target end date:  Indefinite    Indications    Chronic atrial fibrillation (H) [I48.20]  Long-term (current) use of anticoagulants [Z79.01] [Z79.01]             Anticoagulation Episode Summary     INR check location:      Preferred lab:      Send INR reminders to:  CHAPO IZQUIERDO    Comments:        Anticoagulation Care Providers     Provider Role Specialty Phone number    Pablo Benz MD Referring Family Medicine [phone]

## 2021-02-24 PROBLEM — I48.19 PERSISTENT ATRIAL FIBRILLATION (H): Status: ACTIVE | Noted: 2021-02-24

## 2021-02-24 PROBLEM — I42.8 CARDIOMYOPATHY, NONISCHEMIC (H): Status: ACTIVE | Noted: 2021-02-24

## 2021-02-24 RX ORDER — LIDOCAINE 40 MG/G
CREAM TOPICAL
Status: CANCELLED | OUTPATIENT
Start: 2021-02-24

## 2021-02-24 NOTE — TELEPHONE ENCOUNTER
PA Initiation    Medication: (PRADAXA) 75 MG capsule -   Insurance Company: [employer] - Phone [phone] Fax [phone]  Pharmacy Filling the Rx[facility] [place] [facility]  Filling Pharmacy Phone: [phone]  Filling Pharmacy Fax: [phone]  Start Date: 2/24/2021

## 2021-02-24 NOTE — NURSING NOTE
Med Reconcile: Reviewed and verified all current medications with the patient. The updated medication list was printed and given to the patient.  Return Appointment: Patient given instructions regarding scheduling next clinic visit. Patient demonstrated understanding of this information and agreed to call with further questions or concerns.  EP Procedure: Patient given instructions regarding  ablation Discussed purpose, preparation, and procedure with the patient. Patient demonstrated understanding of this information and agreed to call with further questions or concerns.      Verbal orders given per Dr. Calabrese:  afib ablation with CT and OG prior.      Will need to switch from warfarin to dabigatran 7 days prior to ablation.  Pt to stop warfarin 9 days prior then start dabigatran 150 mg twice daily 7 days prior to the ablation.  Pradaxa (dabigatran) was sent to pharmacy for a 30 day supply with no refills.  Voucher was given for 30 days free    Ablation will need GA and Carto mapping    No need to hold any medications prior to the ablation other then switching from warfarin to dabigatran.    Follow up with a 2 week erica 2 months a/p ablation and clinic follow up with Dr. Calabrese in 3 months s/p ablation    Orders placed and message sent to Najma EP .    Patient stated he understood all health information given and agreed to call with further questions or concerns.

## 2021-02-26 NOTE — TELEPHONE ENCOUNTER
PRIOR AUTHORIZATION DENIED    Medication: (PRADAXA) 75 MG capsule -     Denial Date: 2/26/2021    Denial Rational:         Appeal Information:

## 2021-03-01 DIAGNOSIS — Z11.59 ENCOUNTER FOR SCREENING FOR OTHER VIRAL DISEASES: ICD-10-CM

## 2021-03-02 ENCOUNTER — MYC MEDICAL ADVICE (OUTPATIENT)
Dept: CARDIOLOGY | Facility: CLINIC | Age: 66
End: 2021-03-02

## 2021-03-02 NOTE — TELEPHONE ENCOUNTER
Pt was able to utuilize the voucher for 30 days free.  This is all patient needed at this time prior to his cardiac ablation.    Tina Bolanos, RN  Cardiology Care Coordinator  [facility]  [phone] option 1

## 2021-03-02 NOTE — TELEPHONE ENCOUNTER
Writer spoke with patient and reviewed letter with instructions prior to his scheduled ablation.  Patient is aware that he is to STOP warfarin 3/3 and then START pradaxa 3/5.  Per Dr. Calabrese, INR lab is not needed prior to ablation.  Reviewed letter in detail with patient, mailed letter to patient and sent the letter via [employer]ee.  Also helped assist patient with [employer] activation.    Tina Bolanos RN  Cardiology Care Coordinator  [facility]  [phone] option 1

## 2021-03-09 DIAGNOSIS — Z11.59 ENCOUNTER FOR SCREENING FOR OTHER VIRAL DISEASES: ICD-10-CM

## 2021-03-09 LAB
SARS-COV-2 RNA RESP QL NAA+PROBE: NORMAL
SPECIMEN SOURCE: NORMAL

## 2021-03-09 PROCEDURE — U0005 INFEC AGEN DETEC AMPLI PROBE: HCPCS | Performed by: INTERNAL MEDICINE

## 2021-03-09 PROCEDURE — U0003 INFECTIOUS AGENT DETECTION BY NUCLEIC ACID (DNA OR RNA); SEVERE ACUTE RESPIRATORY SYNDROME CORONAVIRUS 2 (SARS-COV-2) (CORONAVIRUS DISEASE [COVID-19]), AMPLIFIED PROBE TECHNIQUE, MAKING USE OF HIGH THROUGHPUT TECHNOLOGIES AS DESCRIBED BY CMS-2020-01-R: HCPCS | Performed by: INTERNAL MEDICINE

## 2021-03-10 LAB
LABORATORY COMMENT REPORT: NORMAL
SARS-COV-2 RNA RESP QL NAA+PROBE: NEGATIVE
SPECIMEN SOURCE: NORMAL

## 2021-03-11 ENCOUNTER — TELEPHONE (OUTPATIENT)
Dept: CARDIOLOGY | Facility: CLINIC | Age: 66
End: 2021-03-11

## 2021-03-11 ENCOUNTER — ANESTHESIA EVENT (OUTPATIENT)
Dept: CARDIOLOGY | Facility: CLINIC | Age: 66
End: 2021-03-11
Payer: COMMERCIAL

## 2021-03-11 ENCOUNTER — HOSPITAL ENCOUNTER (OUTPATIENT)
Dept: CT IMAGING | Facility: CLINIC | Age: 66
End: 2021-03-11
Attending: INTERNAL MEDICINE
Payer: COMMERCIAL

## 2021-03-11 ENCOUNTER — HOSPITAL ENCOUNTER (OUTPATIENT)
Dept: CARDIOLOGY | Facility: CLINIC | Age: 66
End: 2021-03-11
Attending: INTERNAL MEDICINE
Payer: COMMERCIAL

## 2021-03-11 VITALS
OXYGEN SATURATION: 96 % | DIASTOLIC BLOOD PRESSURE: 96 MMHG | RESPIRATION RATE: 11 BRPM | SYSTOLIC BLOOD PRESSURE: 123 MMHG | HEART RATE: 85 BPM

## 2021-03-11 DIAGNOSIS — I48.19 PERSISTENT ATRIAL FIBRILLATION (H): ICD-10-CM

## 2021-03-11 PROCEDURE — 75572 CT HRT W/3D IMAGE: CPT

## 2021-03-11 PROCEDURE — 93312 ECHO TRANSESOPHAGEAL: CPT | Mod: 26 | Performed by: INTERNAL MEDICINE

## 2021-03-11 PROCEDURE — 250N000011 HC RX IP 250 OP 636: Performed by: INTERNAL MEDICINE

## 2021-03-11 PROCEDURE — 99153 MOD SED SAME PHYS/QHP EA: CPT | Performed by: INTERNAL MEDICINE

## 2021-03-11 PROCEDURE — 93325 DOPPLER ECHO COLOR FLOW MAPG: CPT

## 2021-03-11 PROCEDURE — 99152 MOD SED SAME PHYS/QHP 5/>YRS: CPT | Performed by: INTERNAL MEDICINE

## 2021-03-11 PROCEDURE — 75572 CT HRT W/3D IMAGE: CPT | Mod: 26 | Performed by: STUDENT IN AN ORGANIZED HEALTH CARE EDUCATION/TRAINING PROGRAM

## 2021-03-11 PROCEDURE — 93325 DOPPLER ECHO COLOR FLOW MAPG: CPT | Mod: 26 | Performed by: INTERNAL MEDICINE

## 2021-03-11 PROCEDURE — 250N000009 HC RX 250: Performed by: INTERNAL MEDICINE

## 2021-03-11 PROCEDURE — 93320 DOPPLER ECHO COMPLETE: CPT | Mod: 26 | Performed by: INTERNAL MEDICINE

## 2021-03-11 RX ORDER — NALOXONE HYDROCHLORIDE 0.4 MG/ML
0.2 INJECTION, SOLUTION INTRAMUSCULAR; INTRAVENOUS; SUBCUTANEOUS
Status: DISCONTINUED | OUTPATIENT
Start: 2021-03-11 | End: 2021-03-12 | Stop reason: HOSPADM

## 2021-03-11 RX ORDER — LIDOCAINE HYDROCHLORIDE 20 MG/ML
15 SOLUTION OROPHARYNGEAL ONCE
Status: COMPLETED | OUTPATIENT
Start: 2021-03-11 | End: 2021-03-11

## 2021-03-11 RX ORDER — IOPAMIDOL 755 MG/ML
120 INJECTION, SOLUTION INTRAVASCULAR ONCE
Status: COMPLETED | OUTPATIENT
Start: 2021-03-11 | End: 2021-03-11

## 2021-03-11 RX ORDER — FENTANYL CITRATE 50 UG/ML
25 INJECTION, SOLUTION INTRAMUSCULAR; INTRAVENOUS
Status: DISCONTINUED | OUTPATIENT
Start: 2021-03-11 | End: 2021-03-12 | Stop reason: HOSPADM

## 2021-03-11 RX ORDER — FLUMAZENIL 0.1 MG/ML
0.2 INJECTION, SOLUTION INTRAVENOUS
Status: DISCONTINUED | OUTPATIENT
Start: 2021-03-11 | End: 2021-03-12 | Stop reason: HOSPADM

## 2021-03-11 RX ORDER — SODIUM CHLORIDE 9 MG/ML
INJECTION, SOLUTION INTRAVENOUS CONTINUOUS PRN
Status: DISCONTINUED | OUTPATIENT
Start: 2021-03-11 | End: 2021-03-12 | Stop reason: HOSPADM

## 2021-03-11 RX ORDER — FENTANYL CITRATE 50 UG/ML
50 INJECTION, SOLUTION INTRAMUSCULAR; INTRAVENOUS ONCE
Status: COMPLETED | OUTPATIENT
Start: 2021-03-11 | End: 2021-03-11

## 2021-03-11 RX ORDER — NALOXONE HYDROCHLORIDE 0.4 MG/ML
0.4 INJECTION, SOLUTION INTRAMUSCULAR; INTRAVENOUS; SUBCUTANEOUS
Status: DISCONTINUED | OUTPATIENT
Start: 2021-03-11 | End: 2021-03-12 | Stop reason: HOSPADM

## 2021-03-11 RX ORDER — LIDOCAINE 40 MG/G
CREAM TOPICAL
Status: DISCONTINUED | OUTPATIENT
Start: 2021-03-11 | End: 2021-03-12 | Stop reason: HOSPADM

## 2021-03-11 RX ORDER — ACYCLOVIR 200 MG/1
9.5 CAPSULE ORAL
Status: DISCONTINUED | OUTPATIENT
Start: 2021-03-11 | End: 2021-03-12 | Stop reason: HOSPADM

## 2021-03-11 RX ADMIN — IOPAMIDOL 120 ML: 755 INJECTION, SOLUTION INTRAVENOUS at 10:35

## 2021-03-11 RX ADMIN — LIDOCAINE HYDROCHLORIDE 30 ML: 20 SOLUTION ORAL; TOPICAL at 11:06

## 2021-03-11 RX ADMIN — TOPICAL ANESTHETIC 0.5 ML: 200 SPRAY DENTAL; PERIODONTAL at 11:06

## 2021-03-11 RX ADMIN — FENTANYL CITRATE 50 MCG: 50 INJECTION, SOLUTION INTRAMUSCULAR; INTRAVENOUS at 11:19

## 2021-03-11 RX ADMIN — MIDAZOLAM 1 MG: 1 INJECTION INTRAMUSCULAR; INTRAVENOUS at 11:18

## 2021-03-11 RX ADMIN — MIDAZOLAM 0.5 MG: 1 INJECTION INTRAMUSCULAR; INTRAVENOUS at 11:21

## 2021-03-11 RX ADMIN — FENTANYL CITRATE 25 MCG: 50 INJECTION, SOLUTION INTRAMUSCULAR; INTRAVENOUS at 11:21

## 2021-03-11 NOTE — SEDATION DOCUMENTATION
Pt here for OG. Procedure was explained to the pt and the consent was signed. Discharge instructions were reviewed and a copy was given to the pt. Pt was given Fentanyl 75 mcg IV and Versed 1.5 mg IV for sedation. Pt tolerated the procedure without any adverse effects. Pt to remain NPO until 1:05pm. Pt denies any pain or discomfort post procedure. VSS. Pt will be discharged home with a .

## 2021-03-11 NOTE — TELEPHONE ENCOUNTER
[facility] Call Center    Phone Message    May a detailed message be left on voicemail: yes     Reason for Call: Other: Pt would like a call back as he had an allergic reaction to food and wants to take medication but doesn't know if he can with having ct done today and would like a call back asap to discuss     Action Taken: Message routed to:  [facility] ([facility] Cardio    Travel Screening: Not Applicable

## 2021-03-12 ENCOUNTER — HOSPITAL ENCOUNTER (OUTPATIENT)
Facility: CLINIC | Age: 66
Discharge: HOME | End: 2021-03-12
Attending: INTERNAL MEDICINE | Admitting: INTERNAL MEDICINE
Payer: COMMERCIAL

## 2021-03-12 ENCOUNTER — ANESTHESIA (OUTPATIENT)
Dept: CARDIOLOGY | Facility: CLINIC | Age: 66
End: 2021-03-12
Payer: COMMERCIAL

## 2021-03-12 VITALS
TEMPERATURE: 97.9 F | DIASTOLIC BLOOD PRESSURE: 64 MMHG | HEART RATE: 67 BPM | WEIGHT: 187.83 LBS | OXYGEN SATURATION: 98 % | RESPIRATION RATE: 16 BRPM | BODY MASS INDEX: 25.44 KG/M2 | HEIGHT: 72 IN | SYSTOLIC BLOOD PRESSURE: 101 MMHG

## 2021-03-12 DIAGNOSIS — I42.8 CARDIOMYOPATHY, NONISCHEMIC (H): ICD-10-CM

## 2021-03-12 DIAGNOSIS — I48.19 PERSISTENT ATRIAL FIBRILLATION (H): ICD-10-CM

## 2021-03-12 LAB
ANION GAP SERPL CALCULATED.3IONS-SCNC: 5 MMOL/L (ref 3–14)
BUN SERPL-MCNC: 17 MG/DL (ref 7–30)
CALCIUM SERPL-MCNC: 9.2 MG/DL (ref 8.5–10.1)
CHLORIDE SERPL-SCNC: 109 MMOL/L (ref 94–109)
CO2 SERPL-SCNC: 26 MMOL/L (ref 20–32)
CREAT SERPL-MCNC: 0.75 MG/DL (ref 0.66–1.25)
ERYTHROCYTE [DISTWIDTH] IN BLOOD BY AUTOMATED COUNT: 13.7 % (ref 10–15)
GFR SERPL CREATININE-BSD FRML MDRD: >90 ML/MIN/{1.73_M2}
GLUCOSE BLDC GLUCOMTR-MCNC: 94 MG/DL (ref 70–99)
GLUCOSE SERPL-MCNC: 102 MG/DL (ref 70–99)
HCT VFR BLD AUTO: 46.5 % (ref 40–53)
HGB BLD-MCNC: 15.7 G/DL (ref 13.3–17.7)
INR PPP: 1.06 (ref 0.86–1.14)
KCT BLD-ACNC: 139 SEC (ref 75–150)
KCT BLD-ACNC: 144 SEC (ref 75–150)
KCT BLD-ACNC: 253 SEC (ref 75–150)
KCT BLD-ACNC: 294 SEC (ref 75–150)
KCT BLD-ACNC: 298 SEC (ref 75–150)
KCT BLD-ACNC: 302 SEC (ref 75–150)
KCT BLD-ACNC: 302 SEC (ref 75–150)
KCT BLD-ACNC: 335 SEC (ref 75–150)
KCT BLD-ACNC: 347 SEC (ref 75–150)
KCT BLD-ACNC: 355 SEC (ref 75–150)
KCT BLD-ACNC: 359 SEC (ref 75–150)
KCT BLD-ACNC: 379 SEC (ref 75–150)
MCH RBC QN AUTO: 33 PG (ref 26.5–33)
MCHC RBC AUTO-ENTMCNC: 33.8 G/DL (ref 31.5–36.5)
MCV RBC AUTO: 98 FL (ref 78–100)
PLATELET # BLD AUTO: 186 10E9/L (ref 150–450)
POTASSIUM SERPL-SCNC: 4.4 MMOL/L (ref 3.4–5.3)
RBC # BLD AUTO: 4.76 10E12/L (ref 4.4–5.9)
SODIUM SERPL-SCNC: 141 MMOL/L (ref 133–144)
WBC # BLD AUTO: 9.1 10E9/L (ref 4–11)

## 2021-03-12 PROCEDURE — 80048 BASIC METABOLIC PNL TOTAL CA: CPT | Performed by: INTERNAL MEDICINE

## 2021-03-12 PROCEDURE — 258N000003 HC RX IP 258 OP 636: Performed by: STUDENT IN AN ORGANIZED HEALTH CARE EDUCATION/TRAINING PROGRAM

## 2021-03-12 PROCEDURE — 250N000011 HC RX IP 250 OP 636: Performed by: STUDENT IN AN ORGANIZED HEALTH CARE EDUCATION/TRAINING PROGRAM

## 2021-03-12 PROCEDURE — 250N000011 HC RX IP 250 OP 636: Performed by: INTERNAL MEDICINE

## 2021-03-12 PROCEDURE — 250N000011 HC RX IP 250 OP 636: Performed by: NURSE ANESTHETIST, CERTIFIED REGISTERED

## 2021-03-12 PROCEDURE — C1733 CATH, EP, OTHR THAN COOL-TIP: HCPCS | Performed by: INTERNAL MEDICINE

## 2021-03-12 PROCEDURE — C1894 INTRO/SHEATH, NON-LASER: HCPCS | Performed by: INTERNAL MEDICINE

## 2021-03-12 PROCEDURE — C1730 CATH, EP, 19 OR FEW ELECT: HCPCS | Performed by: INTERNAL MEDICINE

## 2021-03-12 PROCEDURE — 250N000009 HC RX 250: Performed by: STUDENT IN AN ORGANIZED HEALTH CARE EDUCATION/TRAINING PROGRAM

## 2021-03-12 PROCEDURE — 82962 GLUCOSE BLOOD TEST: CPT

## 2021-03-12 PROCEDURE — 93613 INTRACARDIAC EPHYS 3D MAPG: CPT | Performed by: INTERNAL MEDICINE

## 2021-03-12 PROCEDURE — 93662 INTRACARDIAC ECG (ICE): CPT | Performed by: INTERNAL MEDICINE

## 2021-03-12 PROCEDURE — 93010 ELECTROCARDIOGRAM REPORT: CPT | Mod: 59 | Performed by: INTERNAL MEDICINE

## 2021-03-12 PROCEDURE — C1759 CATH, INTRA ECHOCARDIOGRAPHY: HCPCS | Performed by: INTERNAL MEDICINE

## 2021-03-12 PROCEDURE — 85027 COMPLETE CBC AUTOMATED: CPT | Performed by: INTERNAL MEDICINE

## 2021-03-12 PROCEDURE — 85347 COAGULATION TIME ACTIVATED: CPT | Mod: 91

## 2021-03-12 PROCEDURE — 272N000001 HC OR GENERAL SUPPLY STERILE: Performed by: INTERNAL MEDICINE

## 2021-03-12 PROCEDURE — 85610 PROTHROMBIN TIME: CPT | Performed by: INTERNAL MEDICINE

## 2021-03-12 PROCEDURE — 370N000017 HC ANESTHESIA TECHNICAL FEE, PER MIN: Performed by: INTERNAL MEDICINE

## 2021-03-12 PROCEDURE — 92960 CARDIOVERSION ELECTRIC EXT: CPT

## 2021-03-12 PROCEDURE — 93005 ELECTROCARDIOGRAM TRACING: CPT

## 2021-03-12 PROCEDURE — 93655 ICAR CATH ABLTJ DSCRT ARRHYT: CPT | Performed by: INTERNAL MEDICINE

## 2021-03-12 PROCEDURE — 250N000009 HC RX 250: Performed by: NURSE ANESTHETIST, CERTIFIED REGISTERED

## 2021-03-12 PROCEDURE — 258N000003 HC RX IP 258 OP 636: Performed by: NURSE ANESTHETIST, CERTIFIED REGISTERED

## 2021-03-12 PROCEDURE — 93656 COMPRE EP EVAL ABLTJ ATR FIB: CPT | Performed by: INTERNAL MEDICINE

## 2021-03-12 PROCEDURE — C1732 CATH, EP, DIAG/ABL, 3D/VECT: HCPCS | Performed by: INTERNAL MEDICINE

## 2021-03-12 PROCEDURE — 36415 COLL VENOUS BLD VENIPUNCTURE: CPT | Performed by: INTERNAL MEDICINE

## 2021-03-12 PROCEDURE — 999N000054 HC STATISTIC EKG NON-CHARGEABLE

## 2021-03-12 RX ORDER — NALOXONE HYDROCHLORIDE 0.4 MG/ML
0.2 INJECTION, SOLUTION INTRAMUSCULAR; INTRAVENOUS; SUBCUTANEOUS
Status: DISCONTINUED | OUTPATIENT
Start: 2021-03-12 | End: 2021-03-12

## 2021-03-12 RX ORDER — ONDANSETRON 4 MG/1
4 TABLET, ORALLY DISINTEGRATING ORAL EVERY 30 MIN PRN
Status: DISCONTINUED | OUTPATIENT
Start: 2021-03-12 | End: 2021-03-12 | Stop reason: HOSPADM

## 2021-03-12 RX ORDER — ACETAMINOPHEN 325 MG/1
975 TABLET ORAL ONCE
Status: DISCONTINUED | OUTPATIENT
Start: 2021-03-12 | End: 2021-03-12 | Stop reason: HOSPADM

## 2021-03-12 RX ORDER — ONDANSETRON 2 MG/ML
INJECTION INTRAMUSCULAR; INTRAVENOUS PRN
Status: DISCONTINUED | OUTPATIENT
Start: 2021-03-12 | End: 2021-03-12

## 2021-03-12 RX ORDER — PROTAMINE SULFATE 10 MG/ML
INJECTION, SOLUTION INTRAVENOUS PRN
Status: DISCONTINUED | OUTPATIENT
Start: 2021-03-12 | End: 2021-03-12

## 2021-03-12 RX ORDER — NALOXONE HYDROCHLORIDE 0.4 MG/ML
0.4 INJECTION, SOLUTION INTRAMUSCULAR; INTRAVENOUS; SUBCUTANEOUS
Status: DISCONTINUED | OUTPATIENT
Start: 2021-03-12 | End: 2021-03-12 | Stop reason: HOSPADM

## 2021-03-12 RX ORDER — PROPOFOL 10 MG/ML
INJECTION, EMULSION INTRAVENOUS PRN
Status: DISCONTINUED | OUTPATIENT
Start: 2021-03-12 | End: 2021-03-12

## 2021-03-12 RX ORDER — ONDANSETRON 2 MG/ML
4 INJECTION INTRAMUSCULAR; INTRAVENOUS EVERY 30 MIN PRN
Status: DISCONTINUED | OUTPATIENT
Start: 2021-03-12 | End: 2021-03-12 | Stop reason: HOSPADM

## 2021-03-12 RX ORDER — NALOXONE HYDROCHLORIDE 0.4 MG/ML
0.2 INJECTION, SOLUTION INTRAMUSCULAR; INTRAVENOUS; SUBCUTANEOUS
Status: DISCONTINUED | OUTPATIENT
Start: 2021-03-12 | End: 2021-03-12 | Stop reason: HOSPADM

## 2021-03-12 RX ORDER — NALOXONE HYDROCHLORIDE 0.4 MG/ML
0.4 INJECTION, SOLUTION INTRAMUSCULAR; INTRAVENOUS; SUBCUTANEOUS
Status: DISCONTINUED | OUTPATIENT
Start: 2021-03-12 | End: 2021-03-12

## 2021-03-12 RX ORDER — FENTANYL CITRATE 50 UG/ML
25-50 INJECTION, SOLUTION INTRAMUSCULAR; INTRAVENOUS
Status: DISCONTINUED | OUTPATIENT
Start: 2021-03-12 | End: 2021-03-12 | Stop reason: HOSPADM

## 2021-03-12 RX ORDER — EPHEDRINE SULFATE 50 MG/ML
INJECTION, SOLUTION INTRAMUSCULAR; INTRAVENOUS; SUBCUTANEOUS PRN
Status: DISCONTINUED | OUTPATIENT
Start: 2021-03-12 | End: 2021-03-12

## 2021-03-12 RX ORDER — FENTANYL CITRATE 50 UG/ML
INJECTION, SOLUTION INTRAMUSCULAR; INTRAVENOUS PRN
Status: DISCONTINUED | OUTPATIENT
Start: 2021-03-12 | End: 2021-03-12

## 2021-03-12 RX ORDER — SODIUM CHLORIDE, SODIUM LACTATE, POTASSIUM CHLORIDE, CALCIUM CHLORIDE 600; 310; 30; 20 MG/100ML; MG/100ML; MG/100ML; MG/100ML
INJECTION, SOLUTION INTRAVENOUS CONTINUOUS
Status: DISCONTINUED | OUTPATIENT
Start: 2021-03-12 | End: 2021-03-12 | Stop reason: HOSPADM

## 2021-03-12 RX ORDER — DEXAMETHASONE SODIUM PHOSPHATE 4 MG/ML
INJECTION, SOLUTION INTRA-ARTICULAR; INTRALESIONAL; INTRAMUSCULAR; INTRAVENOUS; SOFT TISSUE PRN
Status: DISCONTINUED | OUTPATIENT
Start: 2021-03-12 | End: 2021-03-12

## 2021-03-12 RX ORDER — SODIUM CHLORIDE, SODIUM LACTATE, POTASSIUM CHLORIDE, CALCIUM CHLORIDE 600; 310; 30; 20 MG/100ML; MG/100ML; MG/100ML; MG/100ML
INJECTION, SOLUTION INTRAVENOUS CONTINUOUS PRN
Status: DISCONTINUED | OUTPATIENT
Start: 2021-03-12 | End: 2021-03-12

## 2021-03-12 RX ORDER — ATROPINE SULFATE 0.1 MG/ML
0.5 INJECTION INTRAVENOUS EVERY 5 MIN PRN
Status: DISCONTINUED | OUTPATIENT
Start: 2021-03-12 | End: 2021-03-12 | Stop reason: HOSPADM

## 2021-03-12 RX ORDER — HYDROMORPHONE HYDROCHLORIDE 1 MG/ML
.3-.5 INJECTION, SOLUTION INTRAMUSCULAR; INTRAVENOUS; SUBCUTANEOUS EVERY 5 MIN PRN
Status: DISCONTINUED | OUTPATIENT
Start: 2021-03-12 | End: 2021-03-12 | Stop reason: HOSPADM

## 2021-03-12 RX ORDER — FLUMAZENIL 0.1 MG/ML
0.2 INJECTION, SOLUTION INTRAVENOUS
Status: DISCONTINUED | OUTPATIENT
Start: 2021-03-12 | End: 2021-03-12 | Stop reason: HOSPADM

## 2021-03-12 RX ORDER — HEPARIN SODIUM 1000 [USP'U]/ML
INJECTION, SOLUTION INTRAVENOUS; SUBCUTANEOUS
Status: DISCONTINUED | OUTPATIENT
Start: 2021-03-12 | End: 2021-03-12 | Stop reason: HOSPADM

## 2021-03-12 RX ORDER — METOPROLOL TARTRATE 1 MG/ML
1-2 INJECTION, SOLUTION INTRAVENOUS EVERY 5 MIN PRN
Status: DISCONTINUED | OUTPATIENT
Start: 2021-03-12 | End: 2021-03-12 | Stop reason: HOSPADM

## 2021-03-12 RX ORDER — LIDOCAINE 40 MG/G
CREAM TOPICAL
Status: DISCONTINUED | OUTPATIENT
Start: 2021-03-12 | End: 2021-03-12 | Stop reason: HOSPADM

## 2021-03-12 RX ORDER — OXYCODONE AND ACETAMINOPHEN 5; 325 MG/1; MG/1
1 TABLET ORAL EVERY 4 HOURS PRN
Status: DISCONTINUED | OUTPATIENT
Start: 2021-03-12 | End: 2021-03-12 | Stop reason: HOSPADM

## 2021-03-12 RX ORDER — HEPARIN SODIUM 1000 [USP'U]/ML
INJECTION, SOLUTION INTRAVENOUS; SUBCUTANEOUS PRN
Status: DISCONTINUED | OUTPATIENT
Start: 2021-03-12 | End: 2021-03-12

## 2021-03-12 RX ADMIN — HEPARIN SODIUM 3000 UNITS: 1000 INJECTION, SOLUTION INTRAVENOUS; SUBCUTANEOUS at 13:53

## 2021-03-12 RX ADMIN — PROTAMINE SULFATE 65 MG: 10 INJECTION, SOLUTION INTRAVENOUS at 15:55

## 2021-03-12 RX ADMIN — ROCURONIUM BROMIDE 20 MG: 10 INJECTION INTRAVENOUS at 14:29

## 2021-03-12 RX ADMIN — FENTANYL CITRATE 150 MCG: 50 INJECTION, SOLUTION INTRAMUSCULAR; INTRAVENOUS at 10:05

## 2021-03-12 RX ADMIN — Medication 5 MG: at 16:15

## 2021-03-12 RX ADMIN — ROCURONIUM BROMIDE 100 MG: 10 INJECTION INTRAVENOUS at 10:05

## 2021-03-12 RX ADMIN — PHENYLEPHRINE HYDROCHLORIDE 200 MCG: 10 INJECTION INTRAVENOUS at 10:14

## 2021-03-12 RX ADMIN — ROCURONIUM BROMIDE 20 MG: 10 INJECTION INTRAVENOUS at 11:55

## 2021-03-12 RX ADMIN — HEPARIN SODIUM 8500 UNITS: 1000 INJECTION, SOLUTION INTRAVENOUS; SUBCUTANEOUS at 11:09

## 2021-03-12 RX ADMIN — ROCURONIUM BROMIDE 20 MG: 10 INJECTION INTRAVENOUS at 15:36

## 2021-03-12 RX ADMIN — SODIUM CHLORIDE, POTASSIUM CHLORIDE, SODIUM LACTATE AND CALCIUM CHLORIDE: 600; 310; 30; 20 INJECTION, SOLUTION INTRAVENOUS at 09:52

## 2021-03-12 RX ADMIN — PROPOFOL 150 MG: 10 INJECTION, EMULSION INTRAVENOUS at 10:05

## 2021-03-12 RX ADMIN — PHENYLEPHRINE HYDROCHLORIDE 0.4 MCG/KG/MIN: 10 INJECTION INTRAVENOUS at 10:16

## 2021-03-12 RX ADMIN — FENTANYL CITRATE 50 MCG: 50 INJECTION, SOLUTION INTRAMUSCULAR; INTRAVENOUS at 12:24

## 2021-03-12 RX ADMIN — SODIUM CHLORIDE, POTASSIUM CHLORIDE, SODIUM LACTATE AND CALCIUM CHLORIDE: 600; 310; 30; 20 INJECTION, SOLUTION INTRAVENOUS at 10:16

## 2021-03-12 RX ADMIN — HEPARIN SODIUM 4000 UNITS: 1000 INJECTION, SOLUTION INTRAVENOUS; SUBCUTANEOUS at 11:26

## 2021-03-12 RX ADMIN — PHENYLEPHRINE HYDROCHLORIDE 100 MCG: 10 INJECTION INTRAVENOUS at 15:40

## 2021-03-12 RX ADMIN — PROTAMINE SULFATE 10 MG: 10 INJECTION, SOLUTION INTRAVENOUS at 15:51

## 2021-03-12 RX ADMIN — ONDANSETRON 4 MG: 2 INJECTION INTRAMUSCULAR; INTRAVENOUS at 16:03

## 2021-03-12 RX ADMIN — SUGAMMADEX 200 MG: 100 INJECTION, SOLUTION INTRAVENOUS at 16:17

## 2021-03-12 RX ADMIN — PHENYLEPHRINE HYDROCHLORIDE 100 MCG: 10 INJECTION INTRAVENOUS at 15:48

## 2021-03-12 RX ADMIN — ROCURONIUM BROMIDE 30 MG: 10 INJECTION INTRAVENOUS at 13:12

## 2021-03-12 RX ADMIN — ROCURONIUM BROMIDE 20 MG: 10 INJECTION INTRAVENOUS at 11:13

## 2021-03-12 RX ADMIN — MIDAZOLAM 2 MG: 1 INJECTION INTRAMUSCULAR; INTRAVENOUS at 09:52

## 2021-03-12 RX ADMIN — ROCURONIUM BROMIDE 10 MG: 10 INJECTION INTRAVENOUS at 12:24

## 2021-03-12 RX ADMIN — DEXAMETHASONE SODIUM PHOSPHATE 8 MG: 4 INJECTION, SOLUTION INTRA-ARTICULAR; INTRALESIONAL; INTRAMUSCULAR; INTRAVENOUS; SOFT TISSUE at 10:30

## 2021-03-12 ASSESSMENT — NEW YORK HEART ASSOCIATION (NYHA) CLASSIFICATION: NYHA FUNCTIONAL CLASS: I

## 2021-03-12 ASSESSMENT — MIFFLIN-ST. JEOR: SCORE: 1672.06

## 2021-03-12 NOTE — Clinical Note
Potential access sites were evaluated for patency using ultrasound.   The right femoral vein and left femoral vein were selected. Access was obtained under  Sonosite and Fluoroscopic guidance using a micropuncture 21 gauge needle with direct visualization of needle entry.

## 2021-03-12 NOTE — ANESTHESIA POSTPROCEDURE EVALUATION
Patient: Cameron Mariano    Procedure(s):  EP ABLATION FOCAL AFIB    Diagnosis:atrial fibrillation  Diagnosis Additional Information: No value filed.    Anesthesia Type:  General    Note:  Disposition: Outpatient   Postop Pain Control: Uneventful            Sign Out: Well controlled pain   PONV: No   Neuro/Psych: Uneventful            Sign Out: Acceptable/Baseline neuro status   Airway/Respiratory: Uneventful            Sign Out: Acceptable/Baseline resp. status   CV/Hemodynamics: Uneventful            Sign Out: Acceptable CV status   Other NRE: NONE   DID A NON-ROUTINE EVENT OCCUR? No         Last vitals:  Vitals:    03/12/21 1700 03/12/21 1715 03/12/21 1730   BP: 97/63 90/59 97/61   Pulse: 55 60 56   Resp: 14 11 12   Temp:      SpO2: 96% 96% 96%       Last vitals prior to Anesthesia Care Transfer:  CRNA VITALS  3/12/2021 1615 - 3/12/2021 1715      3/12/2021             Pulse:  59    ART BP:  114/58    ART Mean:  77    Temp:  36.5  C (97.7  F)    SpO2:  100 %    Resp Rate (observed):  16    EKG:  Sinus bradycardia          Electronically Signed By: Flower Yao MD  March 12, 2021  5:45 PM

## 2021-03-12 NOTE — ANESTHESIA PREPROCEDURE EVALUATION
Anesthesia Pre-Procedure Evaluation    Patient: Cameron Mariano   MRN: [id number] : 1955        Preoperative Diagnosis: atrial fibrillation   Procedure : Procedure(s):  EP ABLATION FOCAL AFIB   Mr Cameron Mariano is a 65 year old gentleman with permanent atrial fibrillation, HTN, and NICM among others who returns to clinic for management of his atrial fibrillation.  Past Medical History:   Diagnosis Date     Adhesive capsulitis of left shoulder 2019     Atrial fibrillation (H)      History of alcohol abuse      Hypertension      MEDICAL HISTORY OF -     cardioversion X 2     Other primary cardiomyopathies     Cardiomyopathy     Peyronie's disease       Past Surgical History:   Procedure Laterality Date     APPENDECTOMY        Allergies   Allergen Reactions     Aspartame Hives     Hands and feet swell, gets rash      Social History     Tobacco Use     Smoking status: Never Smoker     Smokeless tobacco: Never Used   Substance Use Topics     Alcohol use: Yes     Comment: 3-4 beers a week      Wt Readings from Last 1 Encounters:   21 85.2 kg (187 lb 13.3 oz)        Anesthesia Evaluation            ROS/MED HX  ENT/Pulmonary:       Neurologic:       Cardiovascular:     (+) hypertension-----Taking blood thinners CHF Last EF: 45 NYHA classification: I. Irregular Heartbeat/Palpitations,     METS/Exercise Tolerance:     Hematologic:       Musculoskeletal:       GI/Hepatic:       Renal/Genitourinary:       Endo:       Psychiatric/Substance Use:       Infectious Disease:       Malignancy:       Other:            Physical Exam    Airway  airway exam normal      Mallampati: II       Respiratory Devices and Support         Dental  no notable dental history         Cardiovascular             Pulmonary                   OUTSIDE LABS:  CBC:   Lab Results   Component Value Date    WBC 9.1 2021    WBC 10.0 2020    HGB 15.7 2021    HGB 14.0 2020    HCT 46.5 2021    HCT 41.3 2020    PLT  186 03/12/2021     09/01/2020     BMP:   Lab Results   Component Value Date     03/12/2021     02/22/2021    POTASSIUM 4.4 03/12/2021    POTASSIUM 4.5 02/22/2021    CHLORIDE 109 03/12/2021    CHLORIDE 108 02/22/2021    CO2 26 03/12/2021    CO2 31 02/22/2021    BUN 17 03/12/2021    BUN 18 02/22/2021    CR 0.75 03/12/2021    CR 0.84 02/22/2021     (H) 03/12/2021    GLC 84 02/22/2021     COAGS:   Lab Results   Component Value Date    INR 1.06 03/12/2021     POC:   Lab Results   Component Value Date    BGM 94 03/12/2021     HEPATIC:   Lab Results   Component Value Date    ALBUMIN 3.8 09/01/2020    PROTTOTAL 7.4 09/01/2020    ALT 31 09/01/2020    AST 22 09/01/2020    ALKPHOS 124 09/01/2020    BILITOTAL 0.3 09/01/2020     OTHER:   Lab Results   Component Value Date    IRIS 9.2 03/12/2021    PHOS 2.5 05/08/2019    TSH 1.39 04/29/2011       Anesthesia Plan    ASA Status:  3      Anesthesia Type: General.     - Airway: ETT   Induction: Intravenous.   Maintenance: Balanced.   Techniques and Equipment:     - Lines/Monitors: 2nd IV, Arterial Line     Consents    Anesthesia Plan(s) and associated risks, benefits, and realistic alternatives discussed. Questions answered and patient/representative(s) expressed understanding.     - Discussed with:  Patient      - Extended Intubation/Ventilatory Support Discussed: No.      - Patient is DNR/DNI Status: No    Use of blood products discussed: No .     Postoperative Care    Pain management: IV analgesics.   PONV prophylaxis: Ondansetron (or other 5HT-3), Dexamethasone or Solumedrol     Comments:                Lusi Landa MD

## 2021-03-12 NOTE — OR NURSING
Call placed to Dr. Yao re: asymptomatic HoTN. MD aware and MAP remains > 70 mm Hg. Will continue to monitor and assess.

## 2021-03-12 NOTE — ANESTHESIA CARE TRANSFER NOTE
Patient: Cameron Mariano    Procedure(s):  EP ABLATION FOCAL AFIB    Diagnosis: atrial fibrillation  Diagnosis Additional Information: No value filed.    Anesthesia Type:   No value filed.     Note:    Oropharynx: oropharynx clear of all foreign objects and spontaneously breathing  Level of Consciousness: awake  Oxygen Supplementation: face mask    Independent Airway: airway patency satisfactory and stable  Dentition: dentition unchanged  Vital Signs Stable: post-procedure vital signs reviewed and stable  Report to RN Given: handoff report given  Patient transferred to: PACU  Comments: Patient transferred to PACU in stable condition, breathing spontaneously on face mask, VSS.  Report given to RN.  Handoff Report: Identifed the Patient, Identified the Reponsible Provider, Reviewed the pertinent medical history, Discussed the surgical course, Reviewed Intra-OP anesthesia mangement and issues during anesthesia, Set expectations for post-procedure period and Allowed opportunity for questions and acknowledgement of understanding      Vitals: (Last set prior to Anesthesia Care Transfer)  CRNA VITALS  3/12/2021 1622 - 3/12/2021 1652      3/12/2021             Pulse:  67    SpO2:  98 %        Electronically Signed By: TRINO Chávez CRNA  March 12, 2021  4:52 PM

## 2021-03-12 NOTE — ANESTHESIA PROCEDURE NOTES
Arterial Line Procedure Note    Staff -   Anesthesiologist:  Luis Landa MD  Performed By: anesthesiologist  Location: In OR After Induction  Procedure Start/Stop Times:     patient identified, IV checked, site marked, risks and benefits discussed, informed consent, monitors and equipment checked, pre-op evaluation and at physician/surgeon's request      Correct Patient: Yes      Correct Position: Yes      Correct Site: Yes      Correct Procedure: Yes      Correct Laterality:  Yes    Site Marked:  Yes  Line Placement:     Procedure:  Arterial Line    Insertion Site:  Radial    Insertion laterality:  Left    Skin Prep: Chloraprep      Patient Prep: patient draped, mask, sterile gloves, hat and hand hygiene      Local skin infiltration:  None    Ultrasound Guided?: Yes      Artery evaluated via ultrasound confirming patency.   Using realtime imaging, the artery was punctured and the needle was observed entering the artery.      A permanent image is NOT entered into the patient's record.      Catheter size:  20 gauge, Quick cath    Dressing:  Tegaderm    Complications:  None obvious    Arterial waveform: Yes      IBP within 10% of NIBP: Yes

## 2021-03-12 NOTE — Clinical Note
Arrhythmia Type: atrial fibrillation.   Method of Cardioversion: synchronous.   The arrhythmia was terminated.   Energy shock delivered: 200 joules.   Time shock delivered: 11:14.   Post cardioversion rhythm: sinus rhythm.   No early return to atrial fibrillation (ERAF). No immediate return to atrial fibrillation (IRAF).

## 2021-03-12 NOTE — ANESTHESIA PROCEDURE NOTES
Airway   Date/Time: 3/12/2021 10:07 AM   Patient location during procedure: OR  Staff -   CRNA: Naomi Lr APRN CRNA  Performed By: CRNA    Consent for Airway   Urgency: elective    Indications and Patient Condition  Indications for airway management: bernie-procedural  Induction type:intravenousMask difficulty assessment: 2 - vent by mask + OA or adjuvant +/- NMBA    Final Airway Details  Final airway type: endotracheal airway  Successful airway:ETT - single  Endotracheal Airway Details   ETT size (mm): 7.5  Cuffed: yes  Successful intubation technique: direct laryngoscopy  Grade View of Cords: 1  Adjucts: stylet  Measured from: lips  Secured at (cm): 23  Secured with: pink tape  Bite block used: None    Post intubation assessment   Placement verified by: capnometry, equal breath sounds and chest rise   Number of attempts at approach: 1  Secured with:pink tape  Ease of procedure: easy  Dentition: Intact

## 2021-03-13 ENCOUNTER — MYC MEDICAL ADVICE (OUTPATIENT)
Dept: CARDIOLOGY | Facility: CLINIC | Age: 66
End: 2021-03-13

## 2021-03-13 LAB — INTERPRETATION ECG - MUSE: NORMAL

## 2021-03-13 NOTE — PLAN OF CARE
D/I/A: Pt roomed on 3C in bay.  Arrived via litter and accompanied by RN  On/Off: On monitor.  VSSA.  Rhythm upon arrival sinus rhythm on monitor.  Denies pain or sob.  Reviewed activity restrictions and when to notify RN, ie-changes to breathing or increased chest pressure or chest pain.  CCL access:  Right and left groin.   P: Continue to monitor status.  Discharge to home once meeting criteria.

## 2021-03-13 NOTE — DISCHARGE INSTRUCTIONS
After you go home:    Have an adult stay with you for 24 hours.    Drink plenty of fluids.    You may eat your normal diet, unless your doctor tells you otherwise.    For 24 hours:    Relax and take it easy.    Do NOT smoke.    Do NOT make any important or legal decisions.    Do NOT drive or operate machines at home or at work.    Do NOT drink alcohol.    Remove the Band-Aid after 24 hours. If there is minor oozing, apply another Band-aid and remove it after 12 hours.    For 2 days, do NOT have sex or do any heavy exercise.    Do NOT take a bath, or use a hot tub or pool for at least 3 days. You may shower.    Care of groin site  It is normal to have a small bruise or lump at the site.    Do not scrub the site.    For the first 2 days: Do not stoop or squat. When you cough, sneeze or move your bowels, hold your hand over the puncture site and press gently.    Do not lift more than 10 pounds for at least 3 to 5 days.    Do not use lotion or powder near the puncture site for 3 days.    If you start bleeding from the site in your groin, lie down flat and press firmly  on the site. Call your doctor as soon as you can.    Medicines    If you have started taking Plavix or Effient, do not stop taking it until you talk to your heart doctor (cardiologist).    If you are on metformin (Glucophage), do not restart it until you have blood tests (within 2 to 3 days after discharge). When your doctor tells you it is safe, you may restart the metformin.    If you have stopped any other medicines, check with your nurse or provider about when to restart them.    Call 911 right away if you have bleeding that is heavy or does not stop.    Call your doctor if:    You have a large or growing hard lump around the site.    The site is red, swollen, hot or tender.    Blood or fluid is draining from the site.    You have chills or a fever greater than 101 F (38 C).    Your leg or arm feels numb or cool.    You have hives, a rash or unusual  itching.      [facility] [place]:  [phone] (7 days a week)

## 2021-03-13 NOTE — PROGRESS NOTES
D/I/A:  Patient is tolerating liquids and foods, ambulating, urinating, puncture sites are stable (no bleeding and no hematoma) and patient has a .  A&Ox4 and making needs known. CCL access sites C/D/I; no bleeding or hematoma; CMS intact. VSSA. SB/SR 55-60s w/ PACs on monitor (~2012 tele revealed 12 bts burst of A-tach w/HR 140s, pt asymptomatic).  IV access removed.  Education completed and outlined in AVS or handout: medications reviewed with patient.  Questions answered prior to discharge.  Belongings returned to patient at discharge.    P: Discharged to self care. Patient to follow up with appts as per discharge instruction.

## 2021-03-15 ENCOUNTER — TELEPHONE (OUTPATIENT)
Dept: FAMILY MEDICINE | Facility: CLINIC | Age: 66
End: 2021-03-15

## 2021-03-15 DIAGNOSIS — Z79.01 LONG TERM CURRENT USE OF ANTICOAGULANT THERAPY: ICD-10-CM

## 2021-03-15 DIAGNOSIS — I48.20 CHRONIC ATRIAL FIBRILLATION (H): ICD-10-CM

## 2021-03-15 NOTE — TELEPHONE ENCOUNTER
ANTICOAGULATION  MANAGEMENT: Discharge Review    Cameron Mariano chart reviewed for anticoagulation continuity of care    Outpatient surgery/procedure on 3/12/21 for Ablation.    Discharge disposition: Home    Results:    Recent labs: (last 7 days)     03/12/21  0828   INR 1.06     Anticoagulation inpatient management:     warfarin discontinued    Anticoagulation discharge instructions:     Warfarin dosing: stop warfarin on 3/3   Bridging: 3/5 start pradaxa 150 mg twice a day. Continue pradaxa for 1 month. 2 days prior to running out of pradaxa after ablation, restart warfarin at your usual dose and continue warfarin going forward.  You will need an INR 3 days after starting warfarin.    INR goal change: No      Medication changes affecting anticoagulation: No    Additional factors affecting anticoagulation: No    Plan     Recommend to check INR on 3 days after restarting warfarin    Spoke with Cameron moraes will call and schedule lab for INR when he figures out the time he will be stopping the pradaxa and starting the warfarin again. He will call if questions.     Anticoagulation Calendar updated    Franchesca Jarvis RN

## 2021-03-17 ENCOUNTER — TELEPHONE (OUTPATIENT)
Dept: CARDIOLOGY | Facility: CLINIC | Age: 66
End: 2021-03-17

## 2021-03-17 ENCOUNTER — MYC MEDICAL ADVICE (OUTPATIENT)
Dept: CARDIOLOGY | Facility: CLINIC | Age: 66
End: 2021-03-17

## 2021-03-17 NOTE — TELEPHONE ENCOUNTER
I discussed with patient by telephone.    Patient mentioned that in the first few days after his ablation he had a stomach bloating sensation. This symptom has resolved.    Since the ablation, patient has noticed a chest discomfort with some difficulty breathing when he is lying supine in bed.  When he sits up or when he stands up the symptoms disappear.  I explained to him that the symptoms are consistent with mild pericarditis.  If needed, he can take up to 600 mg of ibuprofen 3 times a day for the next 5 days.    He also mentioned a generalized headache that is positional.  He experiences it when he lies back in a reclining chair.  He will not have the headache if is sitting up or standing up.    He has been eating and drinking well.  He has not noticed any fever, blood in his stools, or blood in his sputum.    Patient has been checking his heart rate and he has been in normal sinus rhythm.    Patient was reassured after the telephone conversation.  All questions and concerns have been addressed and patient is happy with the plan

## 2021-03-30 DIAGNOSIS — I48.20 CHRONIC ATRIAL FIBRILLATION (H): ICD-10-CM

## 2021-03-31 DIAGNOSIS — Z79.01 LONG TERM CURRENT USE OF ANTICOAGULANT THERAPY: Primary | ICD-10-CM

## 2021-03-31 DIAGNOSIS — I48.20 CHRONIC ATRIAL FIBRILLATION (H): ICD-10-CM

## 2021-04-06 ENCOUNTER — ANTICOAGULATION THERAPY VISIT (OUTPATIENT)
Dept: NURSING | Facility: CLINIC | Age: 66
End: 2021-04-06

## 2021-04-06 ENCOUNTER — DOCUMENTATION ONLY (OUTPATIENT)
Dept: FAMILY MEDICINE | Facility: CLINIC | Age: 66
End: 2021-04-06

## 2021-04-06 DIAGNOSIS — I48.20 CHRONIC ATRIAL FIBRILLATION (H): ICD-10-CM

## 2021-04-06 DIAGNOSIS — Z79.01 LONG TERM CURRENT USE OF ANTICOAGULANT THERAPY: ICD-10-CM

## 2021-04-06 DIAGNOSIS — Z79.01 LONG TERM CURRENT USE OF ANTICOAGULANT THERAPY: Primary | ICD-10-CM

## 2021-04-06 LAB
CAPILLARY BLOOD COLLECTION: NORMAL
INR PPP: 1.5 (ref 0.86–1.14)

## 2021-04-06 PROCEDURE — 85610 PROTHROMBIN TIME: CPT | Performed by: FAMILY MEDICINE

## 2021-04-06 PROCEDURE — 99207 PR NO CHARGE NURSE ONLY: CPT

## 2021-04-06 PROCEDURE — 36416 COLLJ CAPILLARY BLOOD SPEC: CPT | Performed by: FAMILY MEDICINE

## 2021-04-06 RX ORDER — WARFARIN SODIUM 5 MG/1
TABLET ORAL
COMMUNITY
Start: 2020-12-18 | End: 2021-05-26

## 2021-04-06 NOTE — PROGRESS NOTES
ANTICOAGULATION MANAGEMENT      Cameron Parry due for annual renewal of referral to anticoagulation monitoring. Order pended for your review and signature.      ANTICOAGULATION SUMMARY      Warfarin indication(s)     Atrial fibrillation    Heart valve present?  NO       Current goal range   INR: 2.0-3.0     Goal appropriate for indication? Yes, INR 2-3 appropriate for hx of DVT, PE, hypercoagulable state, Afib, LVAD, or bileaflet AVR without risk factors     Current duration of therapy Indefinite/long term therapy   Time in Therapeutic Range (TTR)  (Goal > 60%) 83.4 %       Office visit with referring provider's group within last year yes on 9/1/20       Franchesca Jarvis RN

## 2021-04-14 ENCOUNTER — ANTICOAGULATION THERAPY VISIT (OUTPATIENT)
Dept: NURSING | Facility: CLINIC | Age: 66
End: 2021-04-14

## 2021-04-14 DIAGNOSIS — I48.20 CHRONIC ATRIAL FIBRILLATION (H): ICD-10-CM

## 2021-04-14 DIAGNOSIS — Z79.01 LONG TERM CURRENT USE OF ANTICOAGULANT THERAPY: ICD-10-CM

## 2021-04-14 LAB
CAPILLARY BLOOD COLLECTION: NORMAL
INR PPP: 2.4 (ref 0.86–1.14)

## 2021-04-14 PROCEDURE — 85610 PROTHROMBIN TIME: CPT | Performed by: FAMILY MEDICINE

## 2021-04-14 PROCEDURE — 36416 COLLJ CAPILLARY BLOOD SPEC: CPT | Performed by: FAMILY MEDICINE

## 2021-04-14 PROCEDURE — 99207 PR NO CHARGE NURSE ONLY: CPT

## 2021-04-14 NOTE — PROGRESS NOTES
ANTICOAGULATION MANAGEMENT     Patient Name:  Cameron Mariano  Date:  2021    ASSESSMENT /SUBJECTIVE:    Today's INR result of 2.40 is therapeutic. Goal INR of 2.0-3.0      Warfarin dose taken: Warfarin taken as instructed    Diet: No new diet changes affecting INR    Medication changes/ interactions: No new medications/supplements affecting INR    Previous INR: Subtherapeutic     S/S of bleeding or thromboembolism: No    New injury or illness: No    Upcoming surgery, procedure or cardioversion: No    Additional findings: None      PLAN:    Telephone call with Cameron regarding INR result and instructed:     Warfarin Dosing Instructions: Continue your current warfarin dose 10 mg Thursday and 7.5 mg all other days    Instructed patient to follow up no later than: 2 weeks  Lab visit scheduled    Education provided: Please call back if any changes to your diet, medications or how you've been taking warfarin      Cameron verbalizes understanding and agrees to warfarin dosing plan.    Instructed to call the Anticoagulation Clinic for any changes, questions or concerns. (#[phone])        Franchesca Jarvis RN      OBJECTIVE:  Recent labs: (last 7 days)     21  0704   INR 2.40*         No question data found.  Anticoagulation Summary  As of 2021    INR goal:  2.0-3.0   TTR:  72.2 % (1 y)   INR used for dosin.40 (2021)   Warfarin maintenance plan:  10 mg (5 mg x 2) every Thu; 7.5 mg (5 mg x 1.5) all other days   Full warfarin instructions:  10 mg every Thu; 7.5 mg all other days   Weekly warfarin total:  55 mg   Plan last modified:  Franchesca Jarvis RN (2018)   Next INR check:     Priority:  Maintenance   Target end date:  Indefinite    Indications    Chronic atrial fibrillation (H) [I48.20]  Long-term (current) use of anticoagulants [Z79.01] [Z79.01]             Anticoagulation Episode Summary     INR check location:      Preferred lab:      Send INR reminders to:  CHAPO IZQUIERDO     Comments:        Anticoagulation Care Providers     Provider Role Specialty Phone number    Pablo Benz MD Referring Family Medicine [phone]

## 2021-04-17 ENCOUNTER — HEALTH MAINTENANCE LETTER (OUTPATIENT)
Age: 66
End: 2021-04-17

## 2021-04-21 ENCOUNTER — IMMUNIZATION (OUTPATIENT)
Dept: NURSING | Facility: CLINIC | Age: 66
End: 2021-04-21
Payer: COMMERCIAL

## 2021-04-21 PROCEDURE — 0001A PR COVID VAC PFIZER DIL RECON 30 MCG/0.3 ML IM: CPT

## 2021-04-21 PROCEDURE — 91300 PR COVID VAC PFIZER DIL RECON 30 MCG/0.3 ML IM: CPT

## 2021-04-22 DIAGNOSIS — I10 HYPERTENSION GOAL BP (BLOOD PRESSURE) < 140/90: ICD-10-CM

## 2021-04-23 RX ORDER — DILTIAZEM HYDROCHLORIDE 240 MG/1
CAPSULE, COATED, EXTENDED RELEASE ORAL
Qty: 90 CAPSULE | Refills: 0 | Status: SHIPPED | OUTPATIENT
Start: 2021-04-23 | End: 2021-08-25

## 2021-04-23 NOTE — TELEPHONE ENCOUNTER
Routing refill request to provider for review/approval because:   Patient needs to be seen because:    Per office visit with Dr. Benz on 9/1/2020Return in about 6 months (around 3/1/2021) for Lab Work, Routine Visit, med check.

## 2021-04-27 ENCOUNTER — ANTICOAGULATION THERAPY VISIT (OUTPATIENT)
Dept: NURSING | Facility: CLINIC | Age: 66
End: 2021-04-27

## 2021-04-27 DIAGNOSIS — Z79.01 LONG TERM CURRENT USE OF ANTICOAGULANT THERAPY: ICD-10-CM

## 2021-04-27 DIAGNOSIS — I48.20 CHRONIC ATRIAL FIBRILLATION (H): ICD-10-CM

## 2021-04-27 LAB
CAPILLARY BLOOD COLLECTION: NORMAL
INR PPP: 2 (ref 0.86–1.14)

## 2021-04-27 PROCEDURE — 99207 PR NO CHARGE NURSE ONLY: CPT

## 2021-04-27 PROCEDURE — 85610 PROTHROMBIN TIME: CPT | Performed by: FAMILY MEDICINE

## 2021-04-27 PROCEDURE — 36416 COLLJ CAPILLARY BLOOD SPEC: CPT | Performed by: FAMILY MEDICINE

## 2021-04-27 NOTE — PROGRESS NOTES
ANTICOAGULATION MANAGEMENT     Patient Name:  Cameron Mariano  Date:  2021    ASSESSMENT /SUBJECTIVE:    Today's INR result of 2.0 is therapeutic. Goal INR of 2.0-3.0      Warfarin dose taken: Warfarin taken as instructed    Diet: No new diet changes affecting INR    Medication changes/ interactions: No new medications/supplements affecting INR    Previous INR: Therapeutic     S/S of bleeding or thromboembolism: No    New injury or illness: No    Upcoming surgery, procedure or cardioversion: No    Additional findings: None      PLAN:    Telephone call with Cameron regarding INR result and instructed:     Warfarin Dosing Instructions: Continue your current warfarin dose 10 mg Thursday and 7.5 mg all other days    Instructed patient to follow up no later than: 3 weeks  Lab visit scheduled    Education provided: Please call back if any changes to your diet, medications or how you've been taking warfarin      Cameron verbalizes understanding and agrees to warfarin dosing plan.    Instructed to call the Anticoagulation Clinic for any changes, questions or concerns. (#[phone])        Franchesca Jarvis RN      OBJECTIVE:  Recent labs: (last 7 days)     21  1606   INR 2.00*         No question data found.  Anticoagulation Summary  As of 2021    INR goal:  2.0-3.0   TTR:  72.3 % (1 y)   INR used for dosin.00 (2021)   Warfarin maintenance plan:  10 mg (5 mg x 2) every Thu; 7.5 mg (5 mg x 1.5) all other days   Full warfarin instructions:  10 mg every Thu; 7.5 mg all other days   Weekly warfarin total:  55 mg   Plan last modified:  Franchesca Jarvis RN (2018)   Next INR check:     Priority:  Maintenance   Target end date:  Indefinite    Indications    Chronic atrial fibrillation (H) [I48.20]  Long-term (current) use of anticoagulants [Z79.01] [Z79.01]             Anticoagulation Episode Summary     INR check location:      Preferred lab:      Send INR reminders to:  CHAPO IZQUIERDO     Comments:        Anticoagulation Care Providers     Provider Role Specialty Phone number    Pablo Benz MD Referring Family Medicine [phone]

## 2021-05-12 ENCOUNTER — IMMUNIZATION (OUTPATIENT)
Dept: NURSING | Facility: CLINIC | Age: 66
End: 2021-05-12
Attending: FAMILY MEDICINE
Payer: COMMERCIAL

## 2021-05-12 PROCEDURE — 91300 PR COVID VAC PFIZER DIL RECON 30 MCG/0.3 ML IM: CPT

## 2021-05-12 PROCEDURE — 0002A PR COVID VAC PFIZER DIL RECON 30 MCG/0.3 ML IM: CPT

## 2021-05-18 ENCOUNTER — ANTICOAGULATION THERAPY VISIT (OUTPATIENT)
Dept: NURSING | Facility: CLINIC | Age: 66
End: 2021-05-18

## 2021-05-18 DIAGNOSIS — I48.20 CHRONIC ATRIAL FIBRILLATION (H): ICD-10-CM

## 2021-05-18 DIAGNOSIS — Z79.01 LONG TERM CURRENT USE OF ANTICOAGULANT THERAPY: ICD-10-CM

## 2021-05-18 LAB
CAPILLARY BLOOD COLLECTION: NORMAL
INR PPP: 2.4 (ref 0.86–1.14)

## 2021-05-18 PROCEDURE — 99207 PR NO CHARGE NURSE ONLY: CPT

## 2021-05-18 PROCEDURE — 85610 PROTHROMBIN TIME: CPT | Performed by: FAMILY MEDICINE

## 2021-05-18 PROCEDURE — 36416 COLLJ CAPILLARY BLOOD SPEC: CPT | Performed by: FAMILY MEDICINE

## 2021-05-18 NOTE — PROGRESS NOTES
ANTICOAGULATION MANAGEMENT     Patient Name:  Cameron Mariano  Date:  2021    ASSESSMENT /SUBJECTIVE:    Today's INR result of 2.40 is therapeutic. Goal INR of 2.0-3.0      Warfarin dose taken: Warfarin taken as instructed    Diet: No new diet changes affecting INR    Medication changes/ interactions: No new medications/supplements affecting INR    Previous INR: Therapeutic     S/S of bleeding or thromboembolism: No    New injury or illness: No    Upcoming surgery, procedure or cardioversion: No    Additional findings: None      PLAN:    Telephone call with Cameron regarding INR result and instructed:     Warfarin Dosing Instructions: Continue your current warfarin dose 10 mg Thursday and 7.5 mg all other days    Instructed patient to follow up no later than: 4 weeks  Lab visit scheduled    Education provided: Please call back if any changes to your diet, medications or how you've been taking warfarin      Cameron verbalizes understanding and agrees to warfarin dosing plan.    Instructed to call the Anticoagulation Clinic for any changes, questions or concerns. (#[phone])        Franchesca Jarvis RN      OBJECTIVE:  Recent labs: (last 7 days)     21  1611   INR 2.40*         No question data found.  Anticoagulation Summary  As of 2021    INR goal:  2.0-3.0   TTR:  72.3 % (1 y)   INR used for dosin.40 (2021)   Warfarin maintenance plan:  10 mg (5 mg x 2) every Thu; 7.5 mg (5 mg x 1.5) all other days   Full warfarin instructions:  10 mg every Thu; 7.5 mg all other days   Weekly warfarin total:  55 mg   Plan last modified:  Franchesca Jarvis RN (2018)   Next INR check:     Priority:  Maintenance   Target end date:  Indefinite    Indications    Chronic atrial fibrillation (H) [I48.20]  Long-term (current) use of anticoagulants [Z79.01] [Z79.01]             Anticoagulation Episode Summary     INR check location:      Preferred lab:      Send INR reminders to:  CHAPO IZQUIERDO     Comments:        Anticoagulation Care Providers     Provider Role Specialty Phone number    Pablo Benz MD Referring Family Medicine [phone]

## 2021-05-19 PROCEDURE — 93248 EXT ECG>7D<15D REV&INTERPJ: CPT | Performed by: INTERNAL MEDICINE

## 2021-05-24 ENCOUNTER — ALLIED HEALTH/NURSE VISIT (OUTPATIENT)
Dept: CARDIOLOGY | Facility: CLINIC | Age: 66
End: 2021-05-24
Attending: INTERNAL MEDICINE
Payer: COMMERCIAL

## 2021-05-24 DIAGNOSIS — I48.19 PERSISTENT ATRIAL FIBRILLATION (H): ICD-10-CM

## 2021-05-24 DIAGNOSIS — I42.8 CARDIOMYOPATHY, NONISCHEMIC (H): ICD-10-CM

## 2021-06-15 ENCOUNTER — ANTICOAGULATION THERAPY VISIT (OUTPATIENT)
Dept: FAMILY MEDICINE | Facility: CLINIC | Age: 66
End: 2021-06-15

## 2021-06-15 DIAGNOSIS — Z79.01 LONG TERM CURRENT USE OF ANTICOAGULANT THERAPY: ICD-10-CM

## 2021-06-15 DIAGNOSIS — I48.20 CHRONIC ATRIAL FIBRILLATION (H): ICD-10-CM

## 2021-06-15 LAB
CAPILLARY BLOOD COLLECTION: NORMAL
INR PPP: 2.1 (ref 0.86–1.14)

## 2021-06-15 PROCEDURE — 36416 COLLJ CAPILLARY BLOOD SPEC: CPT | Performed by: FAMILY MEDICINE

## 2021-06-15 PROCEDURE — 85610 PROTHROMBIN TIME: CPT | Performed by: FAMILY MEDICINE

## 2021-06-15 NOTE — PROGRESS NOTES
ANTICOAGULATION MANAGEMENT     Patient Name:  Cameron Mariano  Date:  6/15/2021    ASSESSMENT /SUBJECTIVE:    Today's INR result of 2.1 is therapeutic. Goal INR of 2.0-3.0      Warfarin dose taken: Warfarin taken as instructed    Diet: No new diet changes affecting INR    Medication changes/ interactions: No new medications/supplements affecting INR    Previous INR: Therapeutic     S/S of bleeding or thromboembolism: No    New injury or illness: No    Upcoming surgery, procedure or cardioversion: No    Additional findings: None      PLAN:    Warfarin Dosing Instructions: Continue your current warfarin dose 10 mg Thurs; 7.5 mg ROW    Instructed patient to follow up no later than: 5 weeks  Lab visit scheduled    Education provided: Contact [facility]: [phone]  with any changes, questions or concerns.     Telephone call with Cameron whom verbalizes understanding and agrees to plan    Instructed to call the Anticoagulation Clinic for any changes, questions or concerns. (#[phone])        Sonia Baez RN      OBJECTIVE:  Recent labs: (last 7 days)     06/15/21  1600   INR 2.10*         No question data found.  Anticoagulation Summary  As of 6/15/2021    INR goal:  2.0-3.0   TTR:  72.3 % (1 y)   INR used for dosing:     Warfarin maintenance plan:  10 mg (5 mg x 2) every Thu; 7.5 mg (5 mg x 1.5) all other days   Full warfarin instructions:  10 mg every Thu; 7.5 mg all other days   Weekly warfarin total:  55 mg   Plan last modified:  Franchesca Jarvis RN (5/31/2018)   Next INR check:  7/20/2021   Priority:  Maintenance   Target end date:  Indefinite    Indications    Chronic atrial fibrillation (H) [I48.20]  Long-term (current) use of anticoagulants [Z79.01] [Z79.01]             Anticoagulation Episode Summary     INR check location:      Preferred lab:      Send INR reminders to:  CHAPO IZQUIERDO    Comments:        Anticoagulation Care Providers     Provider Role Specialty Phone  number    Pablo Benz MD [facility] [phone]

## 2021-06-21 ENCOUNTER — OFFICE VISIT (OUTPATIENT)
Dept: CARDIOLOGY | Facility: CLINIC | Age: 66
End: 2021-06-21
Payer: COMMERCIAL

## 2021-06-21 VITALS
BODY MASS INDEX: 27.37 KG/M2 | SYSTOLIC BLOOD PRESSURE: 109 MMHG | WEIGHT: 199 LBS | OXYGEN SATURATION: 98 % | DIASTOLIC BLOOD PRESSURE: 56 MMHG | HEART RATE: 79 BPM

## 2021-06-21 DIAGNOSIS — I48.19 PERSISTENT ATRIAL FIBRILLATION (H): Primary | ICD-10-CM

## 2021-06-21 DIAGNOSIS — I42.8 CARDIOMYOPATHY, NONISCHEMIC (H): ICD-10-CM

## 2021-06-21 PROCEDURE — 99214 OFFICE O/P EST MOD 30 MIN: CPT | Mod: GC | Performed by: INTERNAL MEDICINE

## 2021-06-21 NOTE — PROGRESS NOTES
HPI:   Mr Cameron Mariano is a 65 year old gentleman with permanent atrial fibrillation, HTN, and NICM among others who returns to clinic for management of his atrial fibrillation which has recurred following PVI on 3/12/21.     He was first diagnosed with atrial fibrillation in 2007 when he was admitted to [facility] with new onset congestive heart failure and found to have cardiomyopathy with LVEF of 25-30% range. 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 With restoration of sinus rhythm and appropriate medical therapy, in 2008 his LV function normalized to 55-60%; he was noted to have bi-atrial enlargement; but no significant valvular abnormalities. He was then in sinus rhythm for more than a year until he was again found to be in atrial fibrillation in September 2009 and required DCCV. Since then he has had two more cardioversions, last in February 2011 while on Sotalol therapy. NSR only lasted a few days and he was found to be in atrial fibrillation soon after on follow up. Thereafter, a rate control strategy was pursued. He has been on Toprol  mg once daily & Cardizem 240 mg once daily, along with warfarin for anti-coagulation. Unfortunately, an Echocardiogram in 10/2020 showed a reduced EF (40-45%) with mild LV dilation. On 3/12/21 he underwent PVI in attempt to restore sinus rhythm given the newly reduced LVEF,     Today, he returns for a 3 month follow-up after ablation. A Zio patch monitor from 5/19-5/27 shows recurrence of atrial fibrillation with 100% burden and average ventricular rate of 82 bpm. He has been feeling well. Initially he had some chest discomfort and fatigue following the procedure that was likely due to pericarditis. This has now resolved, and he feels back to his baseline. He is unaware of his heart rhythm, and had not noticed the return of atrial fibrillation. He denies any  palpitations, shortness of breath, lightheadedness, dizziness, or near-syncope. 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 He feels that his exertional capacity is unchanged, and is not limited in anyway. He otherwise denies orthopnea, PND, or extremity edema. He continues on warfarin and denies any bleeding issues.     PAST MEDICAL HISTORY:  Past Medical History:   Diagnosis Date     Adhesive capsulitis of left shoulder 4/13/2019     Atrial fibrillation (H)      History of alcohol abuse      Hypertension      MEDICAL HISTORY OF -     cardioversion X 2     Other primary cardiomyopathies     Cardiomyopathy     Peyronie's disease        CURRENT MEDICATIONS:  Current Outpatient Medications   Medication Sig Dispense Refill     aspirin 81 MG tablet Take 1 tablet by mouth daily.       atorvastatin (LIPITOR) 40 MG tablet Take 1 tablet (40 mg) by mouth daily for cholesterol Pharmacy ok to hold prescription until due 90 tablet 3     Cholecalciferol (VITAMIN D) 400 UNITS capsule Take 1 capsule by mouth daily.       diltiazem ER COATED BEADS (CARDIZEM CD/CARTIA XT) 240 MG 24 hr capsule Take 1 capsule by mouth once daily 90 capsule 0     fish oil-omega-3 fatty acids (FISH OIL) 1000 MG capsule Take 2 g by mouth daily.       lisinopril (ZESTRIL) 5 MG tablet Take 1 tablet (5 mg) by mouth daily 30 tablet 3     metoprolol succinate ER (TOPROL-XL) 100 MG 24 hr tablet Take 1 tablet (100 mg) by mouth daily 90 tablet 0     MULTI-VITAMIN OR TABS 1 TABLET DAILY       sildenafil (REVATIO) 20 MG tablet TAKE ONE TO TWO TABLETS BY MOUTH ONCE DAILY AS NEEDED FOR  ED,  NEVER  USE  WITH  NITROGLYCERIN,  TERAZOSIN  OR  DOXAZOSIN 20 tablet 3     warfarin ANTICOAGULANT (COUMADIN) 5 MG tablet TAKE 2 TABLETS (10MG) BY MOUTH ON THURSDAYS AND 1 & 1/2 TABLETS (7.5MG) ALL OTHER DAYS 138 tablet 0       PAST SURGICAL HISTORY:  Past Surgical History:   Procedure Laterality Date     APPENDECTOMY       EP ABLATION FOCAL AFIB  N/A 3/12/2021    Procedure: EP ABLATION FOCAL AFIB;  Surgeon: Tami Calabrese MD;  Location:  HEART CARDIAC CATH LAB       ALLERGIES:     Allergies   Allergen Reactions     Aspartame Hives     Hands and feet swell, gets rash       FAMILY HISTORY:  Family History   Problem Relation Age of Onset     Blood Disease Mother         blood clots     Cancer Father         stomach     Alcohol/Drug Father         smoker     Heart Disease Brother      SOCIAL HISTORY:  Social History     Tobacco Use     Smoking status: Never Smoker     Smokeless tobacco: Never Used   Substance Use Topics     Alcohol use: Yes     Comment: 3-4 beers a week     Drug use: Not Currently     Types: Marijuana       ROS:    ROS: 10 point ROS neg other than the symptoms noted above in the HPI.  Exam:  /56 (BP Location: Right arm, Patient Position: Chair, Cuff Size: Adult Regular)   Pulse 79   Wt 90.3 kg (199 lb)   SpO2 98%   BMI 27.37 kg/m    GENERAL APPEARANCE: healthy, alert and no distress  HEENT: no icterus, no xanthelasmas, normal pupil size  NECK:  no asymmetry or scars,no bruits, JVP not elevated  RESPIRATORY: lungs clear to auscultation - no rales, rhonchi or wheezes, no use of accessory muscles, no retractions, respirations are unlabored, normal respiratory rate  CARDIOVASCULAR: irregularly irregular rhythm, normal S1 with physiologic split S2, no S3 or S4 and no murmur, click or rub  EXTREMITIES: peripheral pulses normal, no edema  NEURO: alert and oriented to person/place/time, normal speech, gait and affect  VASC: Radial pulses are normal in volumes and symmetric bilaterally. No bruits are heard.  SKIN: no ecchymoses, no rashes    Labs:  CBC RESULTS:   Lab Results   Component Value Date    WBC 9.1 03/12/2021    RBC 4.76 03/12/2021    HGB 15.7 03/12/2021    HCT 46.5 03/12/2021    MCV 98 03/12/2021    MCH 33.0 03/12/2021    MCHC 33.8 03/12/2021    RDW 13.7 03/12/2021     03/12/2021       BMP RESULTS:  Lab Results   Component  Value Date     03/12/2021    POTASSIUM 4.4 03/12/2021    CHLORIDE 109 03/12/2021    CO2 26 03/12/2021    ANIONGAP 5 03/12/2021     (H) 03/12/2021    BUN 17 03/12/2021    CR 0.75 03/12/2021    GFRESTIMATED >90 03/12/2021    GFRESTBLACK >90 03/12/2021    IRIS 9.2 03/12/2021        INR RESULTS:  Lab Results   Component Value Date    INR 2.10 (H) 06/15/2021    INR 2.40 (H) 05/18/2021    INR 2.00 (H) 04/27/2021    INR 2.40 (H) 04/14/2021       Procedures:  1. Pulmonary vein isolation with left atrial wide area circumferential radiofrequency ablation.  2. Trans-septal puncture x1  3. Intracardiac echocardiography.  4. Invasive hemodynamic monitoring.  5. 3D electro-anatomic mapping.  6. Esophageal temperature monitoring.  7. Direct current cardioversion of AF to sinus rhythm  8. CTI flutter line  ASSESSMENT:  1. Successful pulmonary vein isolation via radiofrequency ablation with achievement of entry and exit block in all four pulmonary veins for longstanding persistent atrial fibrillation refractory to AAT.  2. Successful radiofrequency ablation of the cavotricuspid isthmus for typical atrial flutter, with achievement of bidirectional block.     PLAN:  1. Transfer to PACU and then to a monitored bed with discharge home later this evening once criteria are met, as ordered.  2. Resume oral anticoagulation this evening  3. Meds as scheduled  4. Stop calcium channel blockers although continue BB for HFmrEF.     Zio Patch    Assessment and Plan:   #Permanent Atrial Fibrillation, [id number] = 2  #NICM, presumably tachycardia mediated  #HTN - well controlled  Mr Cameron Mariano is a 64yo M with permanent atrial fibrillation s/p unsuccessful ablation 3/12/21. He remains asymptomatic, however recently has worsening of his LV systolic function (LVEF 40-45%) as shown on recent echocardiogram.      [id number] = 2  Rate control: continue metoprolol 100mg daily, continue diltiazem 240mg daily.  Rhythm control:  unsuccessful ablation 3/12/21  Anticoagulation: will transition from warfarin to DOAC (pending insurance approval)    - will arrange for cardiac MRI to better classify his cardiomyopathy  - continue metoprolol, lisinopril, and diltiazem  - will transition from warfarin to a DOAC pending insurance approval    Return to clinic in 6 months, sooner if needed.    Patient was seen and discussed with the attending cardiologist, Dr. Calabrese, who is in agreement with the assessment and plan.    Rashad Anders MD  Cardiology Fellow      I have seen, interviewed, and examined patient. I have reviewed the laboratory tests, imaging, and other investigations. I have reviewed the management plan with the patient. I discussed with the team and agree with the findings and plan in this resident/fellow/nurse practitioner's note. In addition, changes in the physical examination, assessment and plan have been incorporated into the note by myself, as to make it a single cohesive document.       Tami Calabrese MD, MS  Cardiology/Cardiac EP Attending Staff          CC  Patient Care Team:  Pablo Benz MD as PCP - General (Family Practice)  Pablo Benz MD as Assigned PCP  Tami Calabrese MD as Assigned Heart and Vascular Provider

## 2021-06-21 NOTE — NURSING NOTE
"Chief Complaint   Patient presents with     RECHECK     follow up Afib.        Initial /56 (BP Location: Right arm, Patient Position: Chair, Cuff Size: Adult Regular)   Pulse 79   Wt 90.3 kg (199 lb)   SpO2 98%   BMI 27.37 kg/m   Estimated body mass index is 27.37 kg/m  as calculated from the following:    Height as of 3/12/21: 1.816 m (5' 11.5\").    Weight as of this encounter: 90.3 kg (199 lb)..  BP completed using cuff size: regular    Sindy White MA  "

## 2021-06-21 NOTE — PATIENT INSTRUCTIONS
Thank you for coming to the [facility]; please note the following instructions:    1. STOP warfarin    2.  START Eliquis 5 mg twice daily when INR is less than 2.0    3.  Follow up in 6 months with a cardiac MRI prior        If you have any questions regarding your visit please contact your care team:     Cardiology  Telephone Number   Tina VILLEGAS, RN  Olena RODRIGUEZ, RN   Alanna SULLIVAN, RMA  Sindy SHERMAN, PIERRE SWAIN, LPN   [phone] (option 1)   For scheduling appts:     [phone] (select option 1)       For the Device Clinic (Pacemakers and ICD's)  RN's :  Ana Valentino   During business hours: [phone]    *After business hours:  [phone] (select option 4)      Normal test result notifications will be released via [employer] or mailed within 7 business days.  All other test results, will be communicated via telephone once reviewed by your cardiologist.    If you need a medication refill please contact your pharmacy.  Please allow 3 business days for your refill to be completed.    As always, thank you for trusting us with your health care needs!

## 2021-06-21 NOTE — LETTER
6/21/2021      RE: Cameron Mariano  [id number]  [place] [address]       Dear Colleague,    Thank you for the opportunity to participate in the care of your patient, Cameron Mariano, at the [facility]. Please see a copy of my visit note below.    HPI:   Mr Cameron Mariano is a 65 year old gentleman with permanent atrial fibrillation, HTN, and NICM among others who returns to clinic for management of his atrial fibrillation which has recurred following PVI on 3/12/21.     He was first diagnosed with atrial fibrillation in 2007 when he was admitted to [facility] with new onset congestive heart failure and found to have cardiomyopathy with LVEF of 25-30% range. 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 With restoration of sinus rhythm and appropriate medical therapy, in 2008 his LV function normalized to 55-60%; he was noted to have bi-atrial enlargement; but no significant valvular abnormalities. He was then in sinus rhythm for more than a year until he was again found to be in atrial fibrillation in September 2009 and required DCCV. Since then he has had two more cardioversions, last in February 2011 while on Sotalol therapy. NSR only lasted a few days and he was found to be in atrial fibrillation soon after on follow up. Thereafter, a rate control strategy was pursued. He has been on Toprol  mg once daily & Cardizem 240 mg once daily, along with warfarin for anti-coagulation. Unfortunately, an Echocardiogram in 10/2020 showed a reduced EF (40-45%) with mild LV dilation. On 3/12/21 he underwent PVI in attempt to restore sinus rhythm given the newly reduced LVEF,     Today, he returns for a 3 month follow-up after ablation. A Zio patch monitor from 5/19-5/27 shows recurrence of atrial fibrillation with 100% burden and  average ventricular rate of 82 bpm. He has been feeling well. Initially he had some chest discomfort and fatigue following the procedure that was likely due to pericarditis. This has now resolved, and he feels back to his baseline. He is unaware of his heart rhythm, and had not noticed the return of atrial fibrillation. He denies any palpitations, shortness of breath, lightheadedness, dizziness, or near-syncope. He works in a [employer] where he is on his feet walking for much of the day. He feels that his exertional capacity is unchanged, and is not limited in anyway. He otherwise denies orthopnea, PND, or extremity edema. He continues on warfarin and denies any bleeding issues.     PAST MEDICAL HISTORY:  Past Medical History:   Diagnosis Date     Adhesive capsulitis of left shoulder 4/13/2019     Atrial fibrillation (H)      History of alcohol abuse      Hypertension      MEDICAL HISTORY OF -     cardioversion X 2     Other primary cardiomyopathies     Cardiomyopathy     Peyronie's disease        CURRENT MEDICATIONS:  Current Outpatient Medications   Medication Sig Dispense Refill     aspirin 81 MG tablet Take 1 tablet by mouth daily.       atorvastatin (LIPITOR) 40 MG tablet Take 1 tablet (40 mg) by mouth daily for cholesterol Pharmacy ok to hold prescription until due 90 tablet 3     Cholecalciferol (VITAMIN D) 400 UNITS capsule Take 1 capsule by mouth daily.       diltiazem ER COATED BEADS (CARDIZEM CD/CARTIA XT) 240 MG 24 hr capsule Take 1 capsule by mouth once daily 90 capsule 0     fish oil-omega-3 fatty acids (FISH OIL) 1000 MG capsule Take 2 g by mouth daily.       lisinopril (ZESTRIL) 5 MG tablet Take 1 tablet (5 mg) by mouth daily 30 tablet 3     metoprolol succinate ER (TOPROL-XL) 100 MG 24 hr tablet Take 1 tablet (100 mg) by mouth daily 90 tablet 0     MULTI-VITAMIN OR TABS 1 TABLET DAILY       sildenafil (REVATIO) 20 MG tablet TAKE ONE TO TWO TABLETS BY MOUTH ONCE DAILY AS NEEDED FOR  ED,  NEVER  USE   WITH  NITROGLYCERIN,  TERAZOSIN  OR  DOXAZOSIN 20 tablet 3     warfarin ANTICOAGULANT (COUMADIN) 5 MG tablet TAKE 2 TABLETS (10MG) BY MOUTH ON THURSDAYS AND 1 & 1/2 TABLETS (7.5MG) ALL OTHER DAYS 138 tablet 0       PAST SURGICAL HISTORY:  Past Surgical History:   Procedure Laterality Date     APPENDECTOMY       EP ABLATION FOCAL AFIB N/A 3/12/2021    Procedure: EP ABLATION FOCAL AFIB;  Surgeon: Tami Calabrese MD;  Location:  HEART CARDIAC CATH LAB       ALLERGIES:     Allergies   Allergen Reactions     Aspartame Hives     Hands and feet swell, gets rash       FAMILY HISTORY:  Family History   Problem Relation Age of Onset     Blood Disease Mother         blood clots     Cancer Father         stomach     Alcohol/Drug Father         smoker     Heart Disease Brother      SOCIAL HISTORY:  Social History     Tobacco Use     Smoking status: Never Smoker     Smokeless tobacco: Never Used   Substance Use Topics     Alcohol use: Yes     Comment: 3-4 beers a week     Drug use: Not Currently     Types: Marijuana       ROS:    ROS: 10 point ROS neg other than the symptoms noted above in the HPI.  Exam:  /56 (BP Location: Right arm, Patient Position: Chair, Cuff Size: Adult Regular)   Pulse 79   Wt 90.3 kg (199 lb)   SpO2 98%   BMI 27.37 kg/m    GENERAL APPEARANCE: healthy, alert and no distress  HEENT: no icterus, no xanthelasmas, normal pupil size  NECK:  no asymmetry or scars,no bruits, JVP not elevated  RESPIRATORY: lungs clear to auscultation - no rales, rhonchi or wheezes, no use of accessory muscles, no retractions, respirations are unlabored, normal respiratory rate  CARDIOVASCULAR: irregularly irregular rhythm, normal S1 with physiologic split S2, no S3 or S4 and no murmur, click or rub  EXTREMITIES: peripheral pulses normal, no edema  NEURO: alert and oriented to person/place/time, normal speech, gait and affect  VASC: Radial pulses are normal in volumes and symmetric bilaterally. No bruits are  heard.  SKIN: no ecchymoses, no rashes    Labs:  CBC RESULTS:   Lab Results   Component Value Date    WBC 9.1 03/12/2021    RBC 4.76 03/12/2021    HGB 15.7 03/12/2021    HCT 46.5 03/12/2021    MCV 98 03/12/2021    MCH 33.0 03/12/2021    MCHC 33.8 03/12/2021    RDW 13.7 03/12/2021     03/12/2021       BMP RESULTS:  Lab Results   Component Value Date     03/12/2021    POTASSIUM 4.4 03/12/2021    CHLORIDE 109 03/12/2021    CO2 26 03/12/2021    ANIONGAP 5 03/12/2021     (H) 03/12/2021    BUN 17 03/12/2021    CR 0.75 03/12/2021    GFRESTIMATED >90 03/12/2021    GFRESTBLACK >90 03/12/2021    IRIS 9.2 03/12/2021        INR RESULTS:  Lab Results   Component Value Date    INR 2.10 (H) 06/15/2021    INR 2.40 (H) 05/18/2021    INR 2.00 (H) 04/27/2021    INR 2.40 (H) 04/14/2021       Procedures:  1. Pulmonary vein isolation with left atrial wide area circumferential radiofrequency ablation.  2. Trans-septal puncture x1  3. Intracardiac echocardiography.  4. Invasive hemodynamic monitoring.  5. 3D electro-anatomic mapping.  6. Esophageal temperature monitoring.  7. Direct current cardioversion of AF to sinus rhythm  8. CTI flutter line  ASSESSMENT:  1. Successful pulmonary vein isolation via radiofrequency ablation with achievement of entry and exit block in all four pulmonary veins for longstanding persistent atrial fibrillation refractory to AAT.  2. Successful radiofrequency ablation of the cavotricuspid isthmus for typical atrial flutter, with achievement of bidirectional block.     PLAN:  1. Transfer to PACU and then to a monitored bed with discharge home later this evening once criteria are met, as ordered.  2. Resume oral anticoagulation this evening  3. Meds as scheduled  4. Stop calcium channel blockers although continue BB for HFmrEF.     Zio Patch    Assessment and Plan:   #Permanent Atrial Fibrillation, [id number] = 2  #NICM, presumably tachycardia mediated  #HTN - well controlled  Mr Barnes  García is a 66yo M with permanent atrial fibrillation s/p unsuccessful ablation 3/12/21. He remains asymptomatic, however recently has worsening of his LV systolic function (LVEF 40-45%) as shown on recent echocardiogram.      [id number] = 2  Rate control: continue metoprolol 100mg daily, continue diltiazem 240mg daily.  Rhythm control: unsuccessful ablation 3/12/21  Anticoagulation: will transition from warfarin to DOAC (pending insurance approval)    - will arrange for cardiac MRI to better classify his cardiomyopathy  - continue metoprolol, lisinopril, and diltiazem  - will transition from warfarin to a DOAC pending insurance approval    Return to clinic in 6 months, sooner if needed.    Patient was seen and discussed with the attending cardiologist, Dr. Calabrese, who is in agreement with the assessment and plan.    Rashad Anders MD  Cardiology Fellow      I have seen, interviewed, and examined patient. I have reviewed the laboratory tests, imaging, and other investigations. I have reviewed the management plan with the patient. I discussed with the team and agree with the findings and plan in this resident/fellow/nurse practitioner's note. In addition, changes in the physical examination, assessment and plan have been incorporated into the note by myself, as to make it a single cohesive document.       Tami Calabrese MD, MS  Cardiology/Cardiac EP Attending Staff          CC  Patient Care Team:  Pablo Benz MD as PCP - General (Family Practice)

## 2021-06-22 ENCOUNTER — TELEPHONE (OUTPATIENT)
Dept: CARDIOLOGY | Facility: CLINIC | Age: 66
End: 2021-06-22

## 2021-06-22 DIAGNOSIS — I48.20 CHRONIC ATRIAL FIBRILLATION (H): ICD-10-CM

## 2021-06-22 DIAGNOSIS — Z79.01 LONG TERM CURRENT USE OF ANTICOAGULANT THERAPY: ICD-10-CM

## 2021-06-22 NOTE — CONFIDENTIAL NOTE
Patient was seen in clinic by Dr. Garcia 6/21.  Patient is currently on warfarin and wants to switch over to NOAC.    Creatinine   Date Value Ref Range Status   03/12/2021 0.75 0.66 - 1.25 mg/dL Final     Date: 6/22/2021    Time of Call: 5:14 PM     Diagnosis:  afib     [ VORB ] Ordering provider: Dr. garcia  Order: STOP Warfarin.  INR 2 days after stopping warfarin.  when INR is less then 2.0 patient can start Eliquis 5 mg twice daily     Order received by: Tina Bolanos RN       Follow-up/additional notes: Left message for patient to call clinic.  Eliquis is covered by insurance with a $0 copay.  [facility] INR clinic.      ALSO MESSAGE SENT TO DR GARCIA TO DETERMINE IF PATIENT IS TO HAVE THE CARDIAC MRI NOW OR IN 6 MONTHS-ONCE HE CLARIFIES THIS THEN WE CAN SCHEDULE.    Tina Bolanos RN  Cardiology Care Coordinator  [facility]  [phone] option 1

## 2021-06-23 NOTE — TELEPHONE ENCOUNTER
Patient planning to switch to DOAC pending approval from insurance. LMTCB to confirm if he is switching and to schedule lab appointment if he is.    Alexandra Perez RN on 6/23/2021 at 8:59 AM

## 2021-06-23 NOTE — TELEPHONE ENCOUNTER
Called patient back. He is leaving town and has NOT stopped warfarin yet. Instructed to continue warfarin. Last dose Friday. No Warfarin OR Eliquis on Sat/Sun. Recheck INR Monday. Instructed not to take anything until nNR checked. If INR under 2.0 may start Eliquis.    Patient verbalized understanding of plan.    OK per lab to schedule same day lab appointment at 3 PM. They know he will be in closer to 4.      Alexandra Perez RN on 6/23/2021 at 1:42 PM

## 2021-06-23 NOTE — TELEPHONE ENCOUNTER
Patient called back and said that Eliquis is covered.  Will need directions on how to start.    Tina Swift RN  [facility]

## 2021-06-28 ENCOUNTER — ANTICOAGULATION THERAPY VISIT (OUTPATIENT)
Dept: NURSING | Facility: CLINIC | Age: 66
End: 2021-06-28

## 2021-06-28 DIAGNOSIS — Z79.01 LONG TERM CURRENT USE OF ANTICOAGULANT THERAPY: ICD-10-CM

## 2021-06-28 DIAGNOSIS — I10 HYPERTENSION GOAL BP (BLOOD PRESSURE) < 140/90: ICD-10-CM

## 2021-06-28 DIAGNOSIS — I48.20 CHRONIC ATRIAL FIBRILLATION (H): ICD-10-CM

## 2021-06-28 LAB
CAPILLARY BLOOD COLLECTION: NORMAL
INR PPP: 1.1 (ref 0.86–1.14)

## 2021-06-28 PROCEDURE — 36416 COLLJ CAPILLARY BLOOD SPEC: CPT | Performed by: FAMILY MEDICINE

## 2021-06-28 PROCEDURE — 85610 PROTHROMBIN TIME: CPT | Performed by: FAMILY MEDICINE

## 2021-06-28 NOTE — PROGRESS NOTES
ANTICOAGULATION MANAGEMENT     Cameron Mariano 65 year old male is on warfarin with subtherapeutic INR result. (Goal INR 2.0-3.0)    Recent labs: (last 7 days)     06/28/21  1605   INR 1.10       ASSESSMENT     Source(s): Chart Review and Patient/Caregiver Call       Warfarin doses taken: Less warfarin taken than planned which may be affecting INR and held for 2 days for transition to Eliquis    Diet: No new diet changes identified    New illness, injury, or hospitalization: No    Medication/supplement changes: will transitioon to eliquis    Signs or symptoms of bleeding or clotting: No    Previous INR: Therapeutic last 2(+) visits    Additional findings: will be stopping warfarin and now beginning Eliquis.      Will discontinue from [facility] .      PLAN     Recommended plan for ongoing change(s) affecting INR     Dosing Instructions: warfarin discontinued with next INR in not needed weeks       Summary  As of 6/28/2021    Full warfarin instructions:  10 mg every Thu; 7.5 mg all other days   Next INR check:               Telephone call with Cameron whom verbalizes understanding and agrees to plan    no further lab visit needed.    Education provided: None required     Plan made per [facility] anticoagulation protocol    Franchesca Jarvis, RN  Anticoagulation Clinic  6/28/2021    _______________________________________________________________________     Anticoagulation Episode Summary     Current INR goal:  2.0-3.0   TTR:  69.1 % (1 y)   Target end date:  Indefinite   Send INR reminders to:  [facility]    Indications    Chronic atrial fibrillation (H) [I48.20]  Long-term (current) use of anticoagulants [Z79.01] [Z79.01]           Comments:           Anticoagulation Care Providers     Provider Role Specialty Phone number    Pablo Benz MD Referring Family Medicine [phone]

## 2021-06-30 RX ORDER — LISINOPRIL 5 MG/1
TABLET ORAL
Qty: 30 TABLET | Refills: 6 | Status: SHIPPED | OUTPATIENT
Start: 2021-06-30 | End: 2021-08-25

## 2021-06-30 NOTE — CONFIDENTIAL NOTE
Last Comprehensive Metabolic Panel:  Sodium   Date Value Ref Range Status   03/12/2021 141 133 - 144 mmol/L Final     Potassium   Date Value Ref Range Status   03/12/2021 4.4 3.4 - 5.3 mmol/L Final     Chloride   Date Value Ref Range Status   03/12/2021 109 94 - 109 mmol/L Final     Carbon Dioxide   Date Value Ref Range Status   03/12/2021 26 20 - 32 mmol/L Final     Anion Gap   Date Value Ref Range Status   03/12/2021 5 3 - 14 mmol/L Final     Glucose   Date Value Ref Range Status   03/12/2021 102 (H) 70 - 99 mg/dL Final     Urea Nitrogen   Date Value Ref Range Status   03/12/2021 17 7 - 30 mg/dL Final     Creatinine   Date Value Ref Range Status   03/12/2021 0.75 0.66 - 1.25 mg/dL Final     GFR Estimate   Date Value Ref Range Status   03/12/2021 >90 >60 mL/min/[1.73_m2] Final     Comment:     Non  GFR Calc  Starting 12/18/2018, serum creatinine based estimated GFR (eGFR) will be   calculated using the Chronic Kidney Disease Epidemiology Collaboration   (CKD-EPI) equation.       Calcium   Date Value Ref Range Status   03/12/2021 9.2 8.5 - 10.1 mg/dL Final     Signed Prescriptions:                        Disp   Refills    lisinopril (ZESTRIL) 5 MG tablet           30 tab*6        Sig: Take 1 tablet by mouth once daily  Authorizing Provider: KARY GARCIA  Ordering User: ANDREA TINSLEY

## 2021-07-14 ENCOUNTER — HOSPITAL ENCOUNTER (OUTPATIENT)
Dept: MRI IMAGING | Facility: CLINIC | Age: 66
Discharge: HOME | End: 2021-07-14
Attending: INTERNAL MEDICINE | Admitting: INTERNAL MEDICINE
Payer: COMMERCIAL

## 2021-07-14 DIAGNOSIS — I42.8 CARDIOMYOPATHY, NONISCHEMIC (H): ICD-10-CM

## 2021-07-14 DIAGNOSIS — I48.19 PERSISTENT ATRIAL FIBRILLATION (H): ICD-10-CM

## 2021-07-14 PROCEDURE — 255N000002 HC RX 255 OP 636: Performed by: INTERNAL MEDICINE

## 2021-07-14 PROCEDURE — A9585 GADOBUTROL INJECTION: HCPCS | Performed by: INTERNAL MEDICINE

## 2021-07-14 PROCEDURE — 75561 CARDIAC MRI FOR MORPH W/DYE: CPT

## 2021-07-14 PROCEDURE — 75561 CARDIAC MRI FOR MORPH W/DYE: CPT | Mod: 26 | Performed by: INTERNAL MEDICINE

## 2021-07-14 RX ORDER — GADOBUTROL 604.72 MG/ML
10 INJECTION INTRAVENOUS ONCE
Status: COMPLETED | OUTPATIENT
Start: 2021-07-14 | End: 2021-07-14

## 2021-07-14 RX ADMIN — GADOBUTROL 10 ML: 604.72 INJECTION INTRAVENOUS at 07:20

## 2021-08-24 ENCOUNTER — TELEPHONE (OUTPATIENT)
Dept: FAMILY MEDICINE | Facility: CLINIC | Age: 66
End: 2021-08-24

## 2021-08-24 DIAGNOSIS — I48.19 PERSISTENT ATRIAL FIBRILLATION (H): ICD-10-CM

## 2021-08-24 DIAGNOSIS — I10 HYPERTENSION GOAL BP (BLOOD PRESSURE) < 140/90: ICD-10-CM

## 2021-08-24 NOTE — TELEPHONE ENCOUNTER
Reason for Call:  Other call back    Detailed comments: Pt said that the prescription was sent to Dr. Bermudez by his pharmacy. It was meant for Dr. Calabrese.  Please call pt if you have anymore questions and/or have updates    Phone Number Patient can be reached at: Cell number on file:    Telephone Information:   Mobile [phone]       Best Time: any    Can we leave a detailed message on this number? YES    Call taken on 8/24/2021 at 6:59 PM by Denver Guevara

## 2021-08-25 ENCOUNTER — TELEPHONE (OUTPATIENT)
Dept: CARDIOLOGY | Facility: CLINIC | Age: 66
End: 2021-08-25

## 2021-08-25 DIAGNOSIS — I10 HYPERTENSION GOAL BP (BLOOD PRESSURE) < 140/90: ICD-10-CM

## 2021-08-25 DIAGNOSIS — I48.19 PERSISTENT ATRIAL FIBRILLATION (H): ICD-10-CM

## 2021-08-25 RX ORDER — DILTIAZEM HYDROCHLORIDE 240 MG/1
240 CAPSULE, COATED, EXTENDED RELEASE ORAL DAILY
Qty: 90 CAPSULE | Refills: 3 | Status: SHIPPED | OUTPATIENT
Start: 2021-08-25 | End: 2022-11-07

## 2021-08-25 RX ORDER — LISINOPRIL 5 MG/1
5 TABLET ORAL DAILY
Qty: 90 TABLET | Refills: 3 | Status: SHIPPED | OUTPATIENT
Start: 2021-08-25 | End: 2022-08-08

## 2021-08-25 RX ORDER — METOPROLOL SUCCINATE 100 MG/1
100 TABLET, EXTENDED RELEASE ORAL DAILY
Qty: 90 TABLET | Refills: 3 | Status: SHIPPED | OUTPATIENT
Start: 2021-08-25 | End: 2022-08-08

## 2021-08-25 NOTE — CONFIDENTIAL NOTE
Spoke to patient who reports that he is now needing his cardiac Rx to be sent to [facility].    Signed Prescriptions:                        Disp   Refills    apixaban ANTICOAGULANT (ELIQUIS ANTICOAGUL*180 ta*3        Sig: Take 1 tablet (5 mg) by mouth 2 times daily  Authorizing Provider: KARY GARCIA  Ordering User: TINA BOLANOS    diltiazem ER COATED BEADS (CARDIZEM CD/CAR*90 cap*3        Sig: Take 1 capsule (240 mg) by mouth daily  Authorizing Provider: KARY GARCIA  Ordering User: TINA BOLANOS    lisinopril (ZESTRIL) 5 MG tablet           90 tab*3        Sig: Take 1 tablet (5 mg) by mouth daily  Authorizing Provider: KARY GARCIA  Ordering User: TINA BOLANOS    metoprolol succinate ER (TOPROL-XL) 100 MG*90 tab*3        Sig: Take 1 tablet (100 mg) by mouth daily  Authorizing Provider: KARY GARCIA  Ordering User: TINA BOLANOS    Rx sent.  Patient also reports that he only has a couple pills left for the eliquis and plans to talk to the pharmacy to pay for some tablets to get through until he receives the mail order prescriptions.  He will contact cardiology if he runs into any issues.    Tina Bolanos RN  Cardiology Care Coordinator  [facility]  [phone] option 1

## 2021-09-16 NOTE — Clinical Note
Chief complaint:   Chief Complaint   Patient presents with   • Ankle Injury     left ankle        PPE utilized for this face-to face office visit:  Examiner wearing a surgical mask.    Vitals:  Visit Vitals  BP (!) 158/92   Pulse 100   Ht 5' 4\" (1.626 m)       HISTORY OF PRESENT ILLNESS     HPI    Patient is a 67-year-old female here today for evaluation of a left ankle injury.  On Tuesday night, she states she tripped over some stairs and fell, injuring her left ankle.  She was not able to walk on the ankle after the injury.  She states that the pain has somewhat improved, she has been icing and using a compression sleeve, which has helped make the swelling go down.  She still is not able to weight bear on the leg and has been using crutches.  Rates her pain is 0/10 at rest but an 8/10 with movement or trying to ambulate.  No history of prior injury, fracture, surgery.  She denies any distal numbness or tingling.  Notes pain is mostly on the outer aspect of the ankle.  She has been taking ibuprofen 400 mg every 4 hours with little to no improvement.  She works as a  and has been very busy on her feet all summer long.  She is scheduled to continue working until the end of the tourist season, mid to late October.  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    Other significant problems:  There are no problems to display for this patient.      PAST MEDICAL, FAMILY AND SOCIAL HISTORY     Medications:  None currently    Allergies:  ALLERGIES:   Allergen Reactions   • Amoxicillin RASH       Past Medical  History/Surgeries:  History reviewed. No pertinent past medical history.    History reviewed. No pertinent surgical history.    Family History:  History reviewed. No pertinent family history.    Social History:  Social History     Tobacco Use   • Smoking status: Never Smoker   • Smokeless tobacco: Never Used   Substance Use Topics   • Alcohol use: Yes       REVIEW OF  Radiofrequency ablation was used with a catheter.   SYSTEMS     Review of Systems  Review of systems as noted in HPI, otherwise negative.     PHYSICAL EXAM     Physical Exam  General:  Alert, pleasant, no acute distress.  Cardiovascular:  Heart rate and rhythm regular, distal pulses within normal limits and symmetric, capillary refill time brisk.  Musculoskeletal:  Mild-to-moderate swelling of the left ankle, mostly noted over the outer aspect.  She is able to dorsiflex and plantar flex, lateral and medial deviation somewhat limited secondary to pain.  Tenderness over the distal fibula/left lateral malleolus.  No tenderness over the metatarsals.  She can wiggle her toes without difficulty.    Neuro:  Alert and oriented x3, no distal numbness or tingling/sensation intact.  Integumentary: skin is warm dry and intact, no abrasions.  Slight bruising to the left ankle.  ASSESSMENT/PLAN     Acute left ankle pain  (primary encounter diagnosis)  Closed fracture of distal end of left fibula, unspecified fracture morphology, initial encounter  Elevated blood pressure reading without diagnosis of hypertension      XR ANKLE 3+ VW LEFT  Narrative: XR ANKLE 3+ VW LEFT    CLINICAL HISTORY: Lateral ankle pain after fall.    COMPARISON: None    FINDINGS: Bones are mildly demineralized.  Oblique fracture of the distal  fibula maintaining anatomic alignment in all 3 views.  Soft tissue swelling  lateral malleolus.  Ankle joint is intact.  No osteochondral lesions.  Impression: IMPRESSION:   1.  Nondisplaced fracture distal left fibula.  2.  Decreased bone mineralization suggested.  Could be correlated with bone  density exam..      X-ray reveals oblique fracture of the distal fibula, nondisplaced.  Findings are suggestive of decreased bone mineralization as well.  I did recommend she continue nonweightbearing and using crutches, she is doing well with crutches.  She is placed in a tall walking boot.  Recommend rest, ice, elevation.  Continue ibuprofen as needed for pain.  A short  supply of hydrocodone 5-325 -take 1 tablet every 6-8 hours as needed for severe pain, #12, 0 refills was submitted to [facility].  Narcotics precautions reviewed. PDMP reviewed; no aberrant behavior identified, prescription authorized.   Referral to [facility] team issued.  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  Follow-up right away with worsening pain, numbness, tingling, pale or gray discoloration of the foot or toes.    Blood pressure elevated, likely related discomfort. Asymptomatic.  I did recommend she follow up and have her blood pressure reassessed with her primary care provider within a week or 2.     Patient and her  Theo, who is present for today's examination verbalized understanding, having no further questions or concerns and are in agreement with care plan.

## 2021-09-26 ENCOUNTER — HEALTH MAINTENANCE LETTER (OUTPATIENT)
Age: 66
End: 2021-09-26

## 2022-08-03 DIAGNOSIS — I10 HYPERTENSION GOAL BP (BLOOD PRESSURE) < 140/90: ICD-10-CM

## 2022-08-03 DIAGNOSIS — I48.19 PERSISTENT ATRIAL FIBRILLATION (H): ICD-10-CM

## 2022-08-08 RX ORDER — METOPROLOL SUCCINATE 100 MG/1
100 TABLET, EXTENDED RELEASE ORAL DAILY
Qty: 90 TABLET | Refills: 0 | Status: SHIPPED | OUTPATIENT
Start: 2022-08-08 | End: 2022-11-07

## 2022-08-08 RX ORDER — LISINOPRIL 5 MG/1
5 TABLET ORAL DAILY
Qty: 90 TABLET | Refills: 0 | Status: SHIPPED | OUTPATIENT
Start: 2022-08-08 | End: 2022-11-07

## 2022-08-08 NOTE — TELEPHONE ENCOUNTER
Labs are ordered-please schedule lab appt prior to Dr. Calabrese appt.    Tina Bolanos, RN  Cardiology Care Coordinator  [facility]  [phone] option 1

## 2022-08-08 NOTE — TELEPHONE ENCOUNTER
Last Clinic Visit: 6/21/2021  [facility] [place]  Creat, PLT, Potassium, BNP overdue  Note seen in over a year and no appointment scheduled  Given 90 day linda refill and sent to clinic team

## 2022-08-08 NOTE — TELEPHONE ENCOUNTER
Spoke with patient and scheduled a follow up with Dr. Calabrese. Patient wanted in person. Soonest available was 10/24. I see notes by RN labs are do. No orders in chart. Please place orders if needed. Patient aware he may get a call back to schedule labs.     PIERRE Hilario

## 2022-08-09 NOTE — TELEPHONE ENCOUNTER
Called patient and scheduled lab appointment for 3 days prior to Calabrese follow-up appointment.    MAMI Son

## 2022-10-21 ENCOUNTER — LAB (OUTPATIENT)
Dept: LAB | Facility: CLINIC | Age: 67
End: 2022-10-21
Payer: COMMERCIAL

## 2022-10-21 DIAGNOSIS — I10 HYPERTENSION GOAL BP (BLOOD PRESSURE) < 140/90: ICD-10-CM

## 2022-10-21 DIAGNOSIS — I48.19 PERSISTENT ATRIAL FIBRILLATION (H): ICD-10-CM

## 2022-10-21 LAB
ANION GAP SERPL CALCULATED.3IONS-SCNC: 3 MMOL/L (ref 3–14)
BUN SERPL-MCNC: 16 MG/DL (ref 7–30)
CALCIUM SERPL-MCNC: 9.1 MG/DL (ref 8.5–10.1)
CHLORIDE BLD-SCNC: 106 MMOL/L (ref 94–109)
CO2 SERPL-SCNC: 31 MMOL/L (ref 20–32)
CREAT SERPL-MCNC: 0.76 MG/DL (ref 0.66–1.25)
ERYTHROCYTE [DISTWIDTH] IN BLOOD BY AUTOMATED COUNT: 14 % (ref 10–15)
GFR SERPL CREATININE-BSD FRML MDRD: >90 ML/MIN/1.73M2
GLUCOSE BLD-MCNC: 96 MG/DL (ref 70–99)
HCT VFR BLD AUTO: 43.5 % (ref 40–53)
HGB BLD-MCNC: 14.8 G/DL (ref 13.3–17.7)
MCH RBC QN AUTO: 33.5 PG (ref 26.5–33)
MCHC RBC AUTO-ENTMCNC: 34 G/DL (ref 31.5–36.5)
MCV RBC AUTO: 98 FL (ref 78–100)
PLATELET # BLD AUTO: 186 10E3/UL (ref 150–450)
POTASSIUM BLD-SCNC: 4.4 MMOL/L (ref 3.4–5.3)
RBC # BLD AUTO: 4.42 10E6/UL (ref 4.4–5.9)
SODIUM SERPL-SCNC: 140 MMOL/L (ref 133–144)
WBC # BLD AUTO: 8.3 10E3/UL (ref 4–11)

## 2022-10-21 PROCEDURE — 80048 BASIC METABOLIC PNL TOTAL CA: CPT

## 2022-10-21 PROCEDURE — 36415 COLL VENOUS BLD VENIPUNCTURE: CPT

## 2022-10-21 PROCEDURE — 85027 COMPLETE CBC AUTOMATED: CPT

## 2022-10-24 ENCOUNTER — OFFICE VISIT (OUTPATIENT)
Dept: CARDIOLOGY | Facility: CLINIC | Age: 67
End: 2022-10-24
Payer: COMMERCIAL

## 2022-10-24 VITALS
BODY MASS INDEX: 26.65 KG/M2 | HEART RATE: 69 BPM | SYSTOLIC BLOOD PRESSURE: 111 MMHG | DIASTOLIC BLOOD PRESSURE: 72 MMHG | OXYGEN SATURATION: 96 % | WEIGHT: 193.8 LBS

## 2022-10-24 DIAGNOSIS — I48.19 PERSISTENT ATRIAL FIBRILLATION (H): ICD-10-CM

## 2022-10-24 DIAGNOSIS — I42.8 CARDIOMYOPATHY, NONISCHEMIC (H): ICD-10-CM

## 2022-10-24 DIAGNOSIS — I48.21 PERMANENT ATRIAL FIBRILLATION (H): Primary | ICD-10-CM

## 2022-10-24 PROCEDURE — 99215 OFFICE O/P EST HI 40 MIN: CPT | Performed by: INTERNAL MEDICINE

## 2022-10-24 NOTE — NURSING NOTE
"Chief Complaint   Patient presents with     Follow Up     Pt reports no heart symptoms, Dr. Calabrese 6 month return        Initial /72 (BP Location: Right arm, Patient Position: Sitting, Cuff Size: Adult Large)   Pulse 69   Wt 87.9 kg (193 lb 12.8 oz)   SpO2 96%   BMI 26.65 kg/m   Estimated body mass index is 26.65 kg/m  as calculated from the following:    Height as of 3/12/21: 1.816 m (5' 11.5\").    Weight as of this encounter: 87.9 kg (193 lb 12.8 oz)..  BP completed using cuff size: MAMI Mendoza    "

## 2022-10-24 NOTE — PATIENT INSTRUCTIONS
Thank you for coming to the [facility] at [place]; please note the following instructions:      We have applied a holter (heart) monitor for you to wear for 3 days.  You may remove the heart monitor on Thursday, Oct 27th at 4:30 pm.  Please see the enclosed instructions for further information, as well as instructions for removal of the heart monitor and return mailing directions.  If you should have questions regarding your monitor, please call [employer] at [phone].  The Cardiology Nurse will contact you with your results (please see result notification details at bottom of summary).    *PLEASE DO NOT SHOWER OR INDUCE EXCESSIVE SWEATING IN THE FIRST 24 HOURS OF WEARING*  Echocardiogram  Follow-up virtually for results        If you have any questions regarding your visit, please contact your care team:     CARDIOLOGY  TELEPHONE NUMBER   Tina WALKERDora, Registered Nurse  Olena RODRIGUEZ, Registered Nurse  Sindy SHERMAN, Registered Medical Assistant  Yuli ANDRES, Visit Facilitator [phone] (select option 1)    *After hours: [phone]   For Scheduling Appts:     [phone] (select option 1)    *After hours: [phone]   For the Device Clinic (Pacemakers and ICD's)  Ana WHITNEY Registered Nurse   During business hours: [phone]    *After business hours:  [phone] (select option 4)      Normal test result notifications will be released via [employer] or mailed within 7 business days.  All other test results, will be communicated via telephone once reviewed by your cardiologist.    If you need a medication refill, please contact your pharmacy.  Please allow 3 business days for your refill to be completed.    As always, thank you for trusting us with your health care needs!

## 2022-10-24 NOTE — LETTER
10/24/2022      RE: Cameron Mariano  [id number]  [place] [address]       Dear Colleague,    Thank you for the opportunity to participate in the care of your patient, Cameron Mariano, at the [facility]. Please see a copy of my visit note below.    HPI: Purpose of visit: Follow-up of permanent atrial fibrillation and nonischemic cardiomyopathy    Patient is a 67-year-old gentleman with a history of pulmonary vein isolation for atrial fibrillation in March 2021 and nonischemic cardiomyopathy.    He is now in permanent atrial fibrillation.  Based on a cardiac MRI in July 2021, patient has a left ventricular ejection fraction of 49%.  Both atria are severely enlarged.  Patient is taking diltiazem 240 mg once daily and metoprolol succinate 100 mg once daily.  He is also taking apixaban for stroke prophylaxis.    In the last 1 year, patient has been doing relatively well.  He reports exertional dyspnea with climbing 1 flight of stairs.  He did not report any symptoms of palpitations, exertional angina, frequent lightheadedness, presyncope or syncope.          PAST MEDICAL HISTORY:  Past Medical History:   Diagnosis Date     Adhesive capsulitis of left shoulder 4/13/2019     Atrial fibrillation (H)      History of alcohol abuse      Hypertension      MEDICAL HISTORY OF -     cardioversion X 2     Other primary cardiomyopathies     Cardiomyopathy     Peyronie's disease        CURRENT MEDICATIONS:  Current Outpatient Medications   Medication Sig Dispense Refill     apixaban ANTICOAGULANT (ELIQUIS ANTICOAGULANT) 5 MG tablet Take 1 tablet (5 mg) by mouth 2 times daily 180 tablet 0     aspirin 81 MG tablet Take 1 tablet by mouth daily.       atorvastatin (LIPITOR) 40 MG tablet Take 1 tablet (40 mg) by mouth daily for cholesterol Pharmacy ok to hold prescription until due 90 tablet 3     diltiazem ER COATED BEADS (CARDIZEM CD/CARTIA  XT) 240 MG 24 hr capsule Take 1 capsule (240 mg) by mouth daily 90 capsule 3     fish oil-omega-3 fatty acids 1000 MG capsule Take 2 g by mouth daily.       lisinopril (ZESTRIL) 5 MG tablet Take 1 tablet (5 mg) by mouth daily 90 tablet 0     metoprolol succinate ER (TOPROL XL) 100 MG 24 hr tablet Take 1 tablet (100 mg) by mouth daily 90 tablet 0     MULTI-VITAMIN OR TABS 1 TABLET DAILY       sildenafil (REVATIO) 20 MG tablet TAKE ONE TO TWO TABLETS BY MOUTH ONCE DAILY AS NEEDED FOR  ED,  NEVER  USE  WITH  NITROGLYCERIN,  TERAZOSIN  OR  DOXAZOSIN 20 tablet 3     Cholecalciferol (VITAMIN D) 400 UNITS capsule Take 1 capsule by mouth daily. (Patient not taking: Reported on 10/24/2022)         PAST SURGICAL HISTORY:  Past Surgical History:   Procedure Laterality Date     APPENDECTOMY       EP ABLATION FOCAL AFIB N/A 3/12/2021    Procedure: EP ABLATION FOCAL AFIB;  Surgeon: Tami Calabrese MD;  Location:  HEART CARDIAC CATH LAB       ALLERGIES:     Allergies   Allergen Reactions     Aspartame Hives     Hands and feet swell, gets rash     Other Food Allergy      Mayonnaise per pt       FAMILY HISTORY:  - Premature coronary artery disease  - Atrial fibrillation  - Sudden cardiac death     SOCIAL HISTORY:  Social History     Tobacco Use     Smoking status: Never     Smokeless tobacco: Never   Substance Use Topics     Alcohol use: Yes     Comment: 3-4 beers a week     Drug use: Not Currently     Types: Marijuana       ROS:   Constitutional: No fever, chills, or sweats. Weight stable.   ENT: No visual disturbance, ear ache, epistaxis, sore throat.   Cardiovascular: As per HPI.   Respiratory: No cough, hemoptysis.    GI: No nausea, vomiting, hematemesis, melena, or hematochezia.   : No hematuria.   Integument: Negative.   Psychiatric: Negative.   Hematologic:  Easy bruising, no easy bleeding.  Neuro: Negative.   Endocrinology: No significant heat or cold intolerance   Musculoskeletal: No myalgia.    Exam:  /72 (BP  Location: Right arm, Patient Position: Sitting, Cuff Size: Adult Large)   Pulse 69   Wt 87.9 kg (193 lb 12.8 oz)   SpO2 96%   BMI 26.65 kg/m    GENERAL APPEARANCE: healthy, alert and no distress  HEENT: no icterus, no xanthelasmas, normal pupil size and reaction, normal palate, mucosa moist, no central cyanosis  NECK: no adenopathy, no asymmetry, masses, or scars, thyroid normal to palpation and no bruits, JVP not elevated  RESPIRATORY: lungs clear to auscultation - no rales, rhonchi or wheezes, no use of accessory muscles, no retractions, respirations are unlabored, normal respiratory rate  CARDIOVASCULAR: irregular rhythm, normal S1 with physiologic split S2, no S3 or S4 and no murmur, click or rub, precordium quiet with normal PMI.  ABDOMEN: soft, non tender, without hepatosplenomegaly, no masses palpable, bowel sounds normal, aorta not enlarged by palpation, no abdominal bruits  EXTREMITIES: peripheral pulses normal, no edema, no bruits  NEURO: alert and oriented to person/place/time, normal speech, gait and affect  VASC: Radial, femoral, dorsalis pedis and posterior tibialis pulses are normal in volumes and symmetric bilaterally. No bruits are heard.  SKIN: no ecchymoses, no rashes    Labs:  CBC RESULTS:   Lab Results   Component Value Date    WBC 8.3 10/21/2022    WBC 9.1 03/12/2021    RBC 4.42 10/21/2022    RBC 4.76 03/12/2021    HGB 14.8 10/21/2022    HGB 15.7 03/12/2021    HCT 43.5 10/21/2022    HCT 46.5 03/12/2021    MCV 98 10/21/2022    MCV 98 03/12/2021    MCH 33.5 (H) 10/21/2022    MCH 33.0 03/12/2021    MCHC 34.0 10/21/2022    MCHC 33.8 03/12/2021    RDW 14.0 10/21/2022    RDW 13.7 03/12/2021     10/21/2022     03/12/2021       BMP RESULTS:  Lab Results   Component Value Date     10/21/2022     03/12/2021    POTASSIUM 4.4 10/21/2022    POTASSIUM 4.4 03/12/2021    CHLORIDE 106 10/21/2022    CHLORIDE 109 03/12/2021    CO2 31 10/21/2022    CO2 26 03/12/2021    ANIONGAP 3  10/21/2022    ANIONGAP 5 03/12/2021    GLC 96 10/21/2022     (H) 03/12/2021    BUN 16 10/21/2022    BUN 17 03/12/2021    CR 0.76 10/21/2022    CR 0.75 03/12/2021    GFRESTIMATED >90 10/21/2022    GFRESTIMATED >90 03/12/2021    GFRESTBLACK >90 03/12/2021    IRIS 9.1 10/21/2022    IRIS 9.2 03/12/2021        INR RESULTS:  Lab Results   Component Value Date    INR 1.10 06/28/2021    INR 2.10 (H) 06/15/2021    INR 2.40 (H) 05/18/2021    INR 2.00 (H) 04/27/2021       Procedures:      Assessment and Plan:     Permanent atrial fibrillation  Nonischemic cardiomyopathy  Exertional dyspnea with mild exertion    I discussed extensively with patient this complex medical situation.  Given the exertional dyspnea, I explained the importance of assessing patient's left ventricular ejection fraction.  To this end, we will perform a transthoracic echocardiogram.  We will also need to assess patient's average ventricular heart rate and we will place a 3-day Zio patch monitor today.    I will see patient by video clinic after all tests have been completed.  All questions and concerns were addressed and patient is happy with the plan.      CC  Patient Care Team:  Pablo Benz MD as PCP - General (Family Practice)  Pablo Benz MD as Assigned PCP  Tami Calabrese MD as Assigned Heart and Vascular Provider          Please do not hesitate to contact me if you have any questions/concerns.     Sincerely,     Tami Calabrese MD

## 2022-10-25 NOTE — PROGRESS NOTES
HPI: Purpose of visit: Follow-up of permanent atrial fibrillation and nonischemic cardiomyopathy    Patient is a 67-year-old gentleman with a history of pulmonary vein isolation for atrial fibrillation in March 2021 and nonischemic cardiomyopathy.    He is now in permanent atrial fibrillation.  Based on a cardiac MRI in July 2021, patient has a left ventricular ejection fraction of 49%.  Both atria are severely enlarged.  Patient is taking diltiazem 240 mg once daily and metoprolol succinate 100 mg once daily.  He is also taking apixaban for stroke prophylaxis.    In the last 1 year, patient has been doing relatively well.  He reports exertional dyspnea with climbing 1 flight of stairs.  He did not report any symptoms of palpitations, exertional angina, frequent lightheadedness, presyncope or syncope.          PAST MEDICAL HISTORY:  Past Medical History:   Diagnosis Date     Adhesive capsulitis of left shoulder 4/13/2019     Atrial fibrillation (H)      History of alcohol abuse      Hypertension      MEDICAL HISTORY OF -     cardioversion X 2     Other primary cardiomyopathies     Cardiomyopathy     Peyronie's disease        CURRENT MEDICATIONS:  Current Outpatient Medications   Medication Sig Dispense Refill     apixaban ANTICOAGULANT (ELIQUIS ANTICOAGULANT) 5 MG tablet Take 1 tablet (5 mg) by mouth 2 times daily 180 tablet 0     aspirin 81 MG tablet Take 1 tablet by mouth daily.       atorvastatin (LIPITOR) 40 MG tablet Take 1 tablet (40 mg) by mouth daily for cholesterol Pharmacy ok to hold prescription until due 90 tablet 3     diltiazem ER COATED BEADS (CARDIZEM CD/CARTIA XT) 240 MG 24 hr capsule Take 1 capsule (240 mg) by mouth daily 90 capsule 3     fish oil-omega-3 fatty acids 1000 MG capsule Take 2 g by mouth daily.       lisinopril (ZESTRIL) 5 MG tablet Take 1 tablet (5 mg) by mouth daily 90 tablet 0     metoprolol succinate ER (TOPROL XL) 100 MG 24 hr tablet Take 1 tablet (100 mg) by mouth daily 90 tablet  0     MULTI-VITAMIN OR TABS 1 TABLET DAILY       sildenafil (REVATIO) 20 MG tablet TAKE ONE TO TWO TABLETS BY MOUTH ONCE DAILY AS NEEDED FOR  ED,  NEVER  USE  WITH  NITROGLYCERIN,  TERAZOSIN  OR  DOXAZOSIN 20 tablet 3     Cholecalciferol (VITAMIN D) 400 UNITS capsule Take 1 capsule by mouth daily. (Patient not taking: Reported on 10/24/2022)         PAST SURGICAL HISTORY:  Past Surgical History:   Procedure Laterality Date     APPENDECTOMY       EP ABLATION FOCAL AFIB N/A 3/12/2021    Procedure: EP ABLATION FOCAL AFIB;  Surgeon: Tami Calabrese MD;  Location:  [facility] CATH LAB       ALLERGIES:     Allergies   Allergen Reactions     Aspartame Hives     Hands and feet swell, gets rash     Other Food Allergy      Mayonnaise per pt       FAMILY HISTORY:  - Premature coronary artery disease  - Atrial fibrillation  - Sudden cardiac death     SOCIAL HISTORY:  Social History     Tobacco Use     Smoking status: Never     Smokeless tobacco: Never   Substance Use Topics     Alcohol use: Yes     Comment: 3-4 beers a week     Drug use: Not Currently     Types: Marijuana       ROS:   Constitutional: No fever, chills, or sweats. Weight stable.   ENT: No visual disturbance, ear ache, epistaxis, sore throat.   Cardiovascular: As per HPI.   Respiratory: No cough, hemoptysis.    GI: No nausea, vomiting, hematemesis, melena, or hematochezia.   : No hematuria.   Integument: Negative.   Psychiatric: Negative.   Hematologic:  Easy bruising, no easy bleeding.  Neuro: Negative.   Endocrinology: No significant heat or cold intolerance   Musculoskeletal: No myalgia.    Exam:  /72 (BP Location: Right arm, Patient Position: Sitting, Cuff Size: Adult Large)   Pulse 69   Wt 87.9 kg (193 lb 12.8 oz)   SpO2 96%   BMI 26.65 kg/m    GENERAL APPEARANCE: healthy, alert and no distress  HEENT: no icterus, no xanthelasmas, normal pupil size and reaction, normal palate, mucosa moist, no central cyanosis  NECK: no adenopathy, no  asymmetry, masses, or scars, thyroid normal to palpation and no bruits, JVP not elevated  RESPIRATORY: lungs clear to auscultation - no rales, rhonchi or wheezes, no use of accessory muscles, no retractions, respirations are unlabored, normal respiratory rate  CARDIOVASCULAR: irregular rhythm, normal S1 with physiologic split S2, no S3 or S4 and no murmur, click or rub, precordium quiet with normal PMI.  ABDOMEN: soft, non tender, without hepatosplenomegaly, no masses palpable, bowel sounds normal, aorta not enlarged by palpation, no abdominal bruits  EXTREMITIES: peripheral pulses normal, no edema, no bruits  NEURO: alert and oriented to person/place/time, normal speech, gait and affect  VASC: Radial, femoral, dorsalis pedis and posterior tibialis pulses are normal in volumes and symmetric bilaterally. No bruits are heard.  SKIN: no ecchymoses, no rashes    Labs:  CBC RESULTS:   Lab Results   Component Value Date    WBC 8.3 10/21/2022    WBC 9.1 03/12/2021    RBC 4.42 10/21/2022    RBC 4.76 03/12/2021    HGB 14.8 10/21/2022    HGB 15.7 03/12/2021    HCT 43.5 10/21/2022    HCT 46.5 03/12/2021    MCV 98 10/21/2022    MCV 98 03/12/2021    MCH 33.5 (H) 10/21/2022    MCH 33.0 03/12/2021    MCHC 34.0 10/21/2022    MCHC 33.8 03/12/2021    RDW 14.0 10/21/2022    RDW 13.7 03/12/2021     10/21/2022     03/12/2021       BMP RESULTS:  Lab Results   Component Value Date     10/21/2022     03/12/2021    POTASSIUM 4.4 10/21/2022    POTASSIUM 4.4 03/12/2021    CHLORIDE 106 10/21/2022    CHLORIDE 109 03/12/2021    CO2 31 10/21/2022    CO2 26 03/12/2021    ANIONGAP 3 10/21/2022    ANIONGAP 5 03/12/2021    GLC 96 10/21/2022     (H) 03/12/2021    BUN 16 10/21/2022    BUN 17 03/12/2021    CR 0.76 10/21/2022    CR 0.75 03/12/2021    GFRESTIMATED >90 10/21/2022    GFRESTIMATED >90 03/12/2021    GFRESTBLACK >90 03/12/2021    IRIS 9.1 10/21/2022    IRIS 9.2 03/12/2021        INR RESULTS:  Lab Results    Component Value Date    INR 1.10 06/28/2021    INR 2.10 (H) 06/15/2021    INR 2.40 (H) 05/18/2021    INR 2.00 (H) 04/27/2021       Procedures:      Assessment and Plan:     Permanent atrial fibrillation  Nonischemic cardiomyopathy  Exertional dyspnea with mild exertion    I discussed extensively with patient this complex medical situation.  Given the exertional dyspnea, I explained the importance of assessing patient's left ventricular ejection fraction.  To this end, we will perform a transthoracic echocardiogram.  We will also need to assess patient's average ventricular heart rate and we will place a 3-day Zio patch monitor today.    I will see patient by video clinic after all tests have been completed.  All questions and concerns were addressed and patient is happy with the plan.      CC  Patient Care Team:  Pablo Benz MD as PCP - General (Family Practice)  Pablo Benz MD as Assigned PCP  Tami Calabrese MD as Assigned Heart and Vascular Provider

## 2022-10-28 ENCOUNTER — ANCILLARY PROCEDURE (OUTPATIENT)
Dept: CARDIOLOGY | Facility: CLINIC | Age: 67
End: 2022-10-28
Attending: INTERNAL MEDICINE
Payer: COMMERCIAL

## 2022-10-28 DIAGNOSIS — I48.19 PERSISTENT ATRIAL FIBRILLATION (H): ICD-10-CM

## 2022-10-28 LAB
BI-PLANE LVEF ECHO: NORMAL
LVEF ECHO: NORMAL

## 2022-10-28 PROCEDURE — 93306 TTE W/DOPPLER COMPLETE: CPT | Performed by: INTERNAL MEDICINE

## 2022-10-28 PROCEDURE — 99207 PR STATISTIC IV PUSH SINGLE INITIAL SUBSTANCE: CPT | Performed by: INTERNAL MEDICINE

## 2022-10-28 RX ADMIN — Medication 6 ML: at 16:18

## 2022-11-01 DIAGNOSIS — I48.19 PERSISTENT ATRIAL FIBRILLATION (H): ICD-10-CM

## 2022-11-01 DIAGNOSIS — I10 HYPERTENSION GOAL BP (BLOOD PRESSURE) < 140/90: ICD-10-CM

## 2022-11-07 RX ORDER — DILTIAZEM HYDROCHLORIDE 240 MG/1
240 CAPSULE, COATED, EXTENDED RELEASE ORAL DAILY
Qty: 90 CAPSULE | Refills: 0 | Status: SHIPPED | OUTPATIENT
Start: 2022-11-07 | End: 2022-12-05

## 2022-11-07 RX ORDER — LISINOPRIL 5 MG/1
5 TABLET ORAL DAILY
Qty: 90 TABLET | Refills: 3 | Status: SHIPPED | OUTPATIENT
Start: 2022-11-07 | End: 2023-03-16

## 2022-11-07 RX ORDER — METOPROLOL SUCCINATE 100 MG/1
100 TABLET, EXTENDED RELEASE ORAL DAILY
Qty: 90 TABLET | Refills: 3 | Status: SHIPPED | OUTPATIENT
Start: 2022-11-07 | End: 2023-04-04

## 2022-11-07 NOTE — TELEPHONE ENCOUNTER
Last Clinic Visit: 10/24/2022  [facility] [place]  ALT overdue - clinic notifdied

## 2022-12-05 ENCOUNTER — VIRTUAL VISIT (OUTPATIENT)
Dept: CARDIOLOGY | Facility: CLINIC | Age: 67
End: 2022-12-05
Payer: COMMERCIAL

## 2022-12-05 DIAGNOSIS — I48.21 PERMANENT ATRIAL FIBRILLATION (H): Primary | ICD-10-CM

## 2022-12-05 DIAGNOSIS — I10 HYPERTENSION GOAL BP (BLOOD PRESSURE) < 140/90: ICD-10-CM

## 2022-12-05 PROCEDURE — 99213 OFFICE O/P EST LOW 20 MIN: CPT | Mod: 95 | Performed by: INTERNAL MEDICINE

## 2022-12-05 RX ORDER — DILTIAZEM HYDROCHLORIDE 240 MG/1
240 CAPSULE, COATED, EXTENDED RELEASE ORAL DAILY
Qty: 90 CAPSULE | Refills: 1 | Status: SHIPPED | OUTPATIENT
Start: 2022-12-05 | End: 2023-04-11

## 2022-12-05 NOTE — PROGRESS NOTES
"Electrophysiology Clinic Telephone Visit    Camerno Mariano is a 67 year old male who is being evaluated via a billable telephone visit.      The patient has been notified of following:     \"This telephone visit will be conducted via a call between you and your physician/provider. We have found that certain health care needs can be provided without the need for a physical exam.  This service lets us provide the care you need with a short phone conversation.  If a prescription is necessary we can send it directly to your pharmacy.  If lab work is needed we can place an order for that and you can then stop by our lab to have the test done at a later time.     If during the course of the call the physician/provider feels a telephone visit is not appropriate, you will not be charged for this service.\"   Patient has given verbal consent for Telephone visit?  Yes    HPI:   Patient is a 67-year-old gentleman with a history of permanent atrial fibrillation, hx of pulmonary vein isolation in March 2021 and nonischemic cardiomyopathy. He was last since in clinic on 10/24/2022. At that time he was complaining of increasing exertional dyspnea. An echocardiogram showed unchanged EF (Biplane 49%) and a zio showed an average heart rate of 84 bpm. He presents for a phone visit to follow up his dyspnea.     Today he reports that he is feeling better. His dyspnea has improved. 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 He is able to complete his work without issue. He has mild, chronic orthopnea. Currently he is sleeping with one pillow when he was previously sleeping with two. No lower extremity edema or weight changes. He did not report any symptoms of palpitations, exertional angina, frequent lightheadedness, presyncope or syncope.    PAST MEDICAL HISTORY:  Past Medical History:   Diagnosis Date     Adhesive capsulitis of left shoulder 4/13/2019     Atrial fibrillation (H)      History of " alcohol abuse      Hypertension      MEDICAL HISTORY OF -     cardioversion X 2     Other primary cardiomyopathies     Cardiomyopathy     Peyronie's disease        CURRENT MEDICATIONS:  Current Outpatient Medications   Medication Sig Dispense Refill     apixaban ANTICOAGULANT (ELIQUIS ANTICOAGULANT) 5 MG tablet Take 1 tablet (5 mg) by mouth 2 times daily 180 tablet 3     aspirin 81 MG tablet Take 1 tablet by mouth daily.       atorvastatin (LIPITOR) 40 MG tablet Take 1 tablet (40 mg) by mouth daily for cholesterol Pharmacy ok to hold prescription until due 90 tablet 3     Cholecalciferol (VITAMIN D) 400 UNITS capsule Take 1 capsule by mouth daily. (Patient not taking: Reported on 10/24/2022)       diltiazem ER COATED BEADS (CARDIZEM CD/CARTIA XT) 240 MG 24 hr capsule Take 1 capsule (240 mg) by mouth daily 90 capsule 0     fish oil-omega-3 fatty acids 1000 MG capsule Take 2 g by mouth daily.       lisinopril (ZESTRIL) 5 MG tablet Take 1 tablet (5 mg) by mouth daily 90 tablet 3     metoprolol succinate ER (TOPROL XL) 100 MG 24 hr tablet Take 1 tablet (100 mg) by mouth daily 90 tablet 3     MULTI-VITAMIN OR TABS 1 TABLET DAILY       sildenafil (REVATIO) 20 MG tablet TAKE ONE TO TWO TABLETS BY MOUTH ONCE DAILY AS NEEDED FOR  ED,  NEVER  USE  WITH  NITROGLYCERIN,  TERAZOSIN  OR  DOXAZOSIN 20 tablet 3       PAST SURGICAL HISTORY:  Past Surgical History:   Procedure Laterality Date     APPENDECTOMY       EP ABLATION FOCAL AFIB N/A 3/12/2021    Procedure: EP ABLATION FOCAL AFIB;  Surgeon: Tami Calabrese MD;  Location:  HEART CARDIAC CATH LAB       ALLERGIES:     Allergies   Allergen Reactions     Aspartame Hives     Hands and feet swell, gets rash     Other Food Allergy      Mayonnaise per pt       FAMILY HISTORY:  Family History   Problem Relation Age of Onset     Blood Disease Mother         blood clots     Cancer Father         stomach     Alcohol/Drug Father         smoker     Heart Disease Brother        SOCIAL  HISTORY:  Social History     Tobacco Use     Smoking status: Never     Smokeless tobacco: Never   Substance Use Topics     Alcohol use: Yes     Comment: 3-4 beers a week     Drug use: Not Currently     Types: Marijuana       ROS:  10 point ROS neg other than the symptoms noted above in the HPI.    Labs:  Reviewed.     Testing/Procedures:  ZioPatch:   Persistent atrial fibrillation. 18 runs of NSVT up to 8 beats. Average HR 84 bpm.    ECHOCARDIOGRAM:   Left ventricular function is decreased. The ejection fraction is 45-50% (mildly reduced). Biplane LVEF is 49%.  Right ventricular function, chamber size, wall motion, and thickness are normal.  No significant valvular abnormalities were noted.  This study was compared with the study from 3/11/21 ([facility]) .  No significant changes noted.      Assessment and Plan:   Permanent atrial fibrillation  Nonischemic Cardiomyopathy  Exertional dyspnea    Presenting for follow up of exertion dyspnea. He has permanent atrial fibrillation which is well rate controlled based on the ziopatch. His LV function is unchanged but his RA pressure was estimated at 8 mmHg so it is possible that there is a component of heart failure. Reassuringly his symptoms have improved. Will defer on addition of any diuretics at this time, but if his symptoms of dyspnea worse could consider starting low dose lasix. Will continue his other cardiac medications.    He will follow up in the [facility] clinic in 6 months or sooner if needed.       Telephone Visit Duration: 6 minutes        I have interviewed patient. I have reviewed the laboratory tests, imaging, and other investigations. I have reviewed the management plan with the patient. I discussed with the team and agree with the findings and plan in this resident/fellow/nurse practitioner's note. In addition, changes in the assessment and plan have been incorporated into the note by myself, as to make it a single cohesive document.       Tami Calabrese MD,  MS  Cardiology/Cardiac EP Attending Staff

## 2022-12-05 NOTE — PATIENT INSTRUCTIONS
Thank you for coming to the [facility] at [place]; please note the following instructions:    1. Follow-up in 6 months in person    2. Diltiazem was refilled        If you have any questions regarding your visit, please contact your care team:     CARDIOLOGY  TELEPHONE NUMBER   Tina WALKERDora, Registered Nurse  Olena RODRIGUEZ, Registered Nurse  Sindy SHERMAN, Registered Medical Assistant  Radha WU, Certified Medical Assistant  Yuli ANDRES, Visit Facilitator [phone] (select option 1)    *After hours: [phone]   For Scheduling Appts:     [phone] (select option 1)    *After hours: [phone]   For the Device Clinic (Pacemakers and ICD's)  Ana WHITNEY, Registered Nurse   During business hours: [phone]    *After business hours:  [phone] (select option 4)      Normal test result notifications will be released via [employer] or mailed within 7 business days.  All other test results, will be communicated via telephone once reviewed by your cardiologist.    If you need a medication refill, please contact your pharmacy.  Please allow 3 business days for your refill to be completed.    As always, thank you for trusting us with your health care needs!

## 2022-12-05 NOTE — NURSING NOTE
Cameron is a 67 year who is being evaluated via a billable Telephone visit.      How would you like to obtain your AVS? Koru  Please call [phone]  Will anyone else be joining your telephone visit? No      Telephone-Visit Details      Distant Location (provider location):  Off-site     Platform used for Video Visit: [employer]     Chest Pain/Tightness/Pressure: None  SOB or RAMOS: None  Heart Palpitations or fluttering: None  Lightheadedness or Dizziness: None  Stamina: No issues     Unable to obtain BP due to virtual visit.   Weight: 185 lbs  Height: 5 ft 11 in     Radha Grubbs CMA ([facility])

## 2022-12-05 NOTE — LETTER
"12/5/2022      RE: Cameron Mariano  [address]  [place] [facility] [address]       Dear Colleague,    Thank you for the opportunity to participate in the care of your patient, Cameron Mariano, at the [facility]. Please see a copy of my visit note below.    Electrophysiology Clinic Telephone Visit    Cameron Mariano is a 67 year old male who is being evaluated via a billable telephone visit.      The patient has been notified of following:     \"This telephone visit will be conducted via a call between you and your physician/provider. We have found that certain health care needs can be provided without the need for a physical exam.  This service lets us provide the care you need with a short phone conversation.  If a prescription is necessary we can send it directly to your pharmacy.  If lab work is needed we can place an order for that and you can then stop by our lab to have the test done at a later time.     If during the course of the call the physician/provider feels a telephone visit is not appropriate, you will not be charged for this service.\"   Patient has given verbal consent for Telephone visit?  Yes    HPI:   Patient is a 67-year-old gentleman with a history of permanent atrial fibrillation, hx of pulmonary vein isolation in March 2021 and nonischemic cardiomyopathy. He was last since in clinic on 10/24/2022. At that time he was complaining of increasing exertional dyspnea. An echocardiogram showed unchanged EF (Biplane 49%) and a zio showed an average heart rate of 84 bpm. He presents for a phone visit to follow up his dyspnea.     Today he reports that he is feeling better. His dyspnea has improved. 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 He is able to complete his work without issue. He has mild, chronic orthopnea. Currently he is sleeping with one pillow when he was previously " sleeping with two. No lower extremity edema or weight changes. He did not report any symptoms of palpitations, exertional angina, frequent lightheadedness, presyncope or syncope.    PAST MEDICAL HISTORY:  Past Medical History:   Diagnosis Date     Adhesive capsulitis of left shoulder 4/13/2019     Atrial fibrillation (H)      History of alcohol abuse      Hypertension      MEDICAL HISTORY OF -     cardioversion X 2     Other primary cardiomyopathies     Cardiomyopathy     Peyronie's disease        CURRENT MEDICATIONS:  Current Outpatient Medications   Medication Sig Dispense Refill     apixaban ANTICOAGULANT (ELIQUIS ANTICOAGULANT) 5 MG tablet Take 1 tablet (5 mg) by mouth 2 times daily 180 tablet 3     aspirin 81 MG tablet Take 1 tablet by mouth daily.       atorvastatin (LIPITOR) 40 MG tablet Take 1 tablet (40 mg) by mouth daily for cholesterol Pharmacy ok to hold prescription until due 90 tablet 3     Cholecalciferol (VITAMIN D) 400 UNITS capsule Take 1 capsule by mouth daily. (Patient not taking: Reported on 10/24/2022)       diltiazem ER COATED BEADS (CARDIZEM CD/CARTIA XT) 240 MG 24 hr capsule Take 1 capsule (240 mg) by mouth daily 90 capsule 0     fish oil-omega-3 fatty acids 1000 MG capsule Take 2 g by mouth daily.       lisinopril (ZESTRIL) 5 MG tablet Take 1 tablet (5 mg) by mouth daily 90 tablet 3     metoprolol succinate ER (TOPROL XL) 100 MG 24 hr tablet Take 1 tablet (100 mg) by mouth daily 90 tablet 3     MULTI-VITAMIN OR TABS 1 TABLET DAILY       sildenafil (REVATIO) 20 MG tablet TAKE ONE TO TWO TABLETS BY MOUTH ONCE DAILY AS NEEDED FOR  ED,  NEVER  USE  WITH  NITROGLYCERIN,  TERAZOSIN  OR  DOXAZOSIN 20 tablet 3       PAST SURGICAL HISTORY:  Past Surgical History:   Procedure Laterality Date     APPENDECTOMY       EP ABLATION FOCAL AFIB N/A 3/12/2021    Procedure: EP ABLATION FOCAL AFIB;  Surgeon: Tami Calabrese MD;  Location:  HEART CARDIAC CATH LAB       ALLERGIES:     Allergies   Allergen  Reactions     Aspartame Hives     Hands and feet swell, gets rash     Other Food Allergy      Mayonnaise per pt       FAMILY HISTORY:  Family History   Problem Relation Age of Onset     Blood Disease Mother         blood clots     Cancer Father         stomach     Alcohol/Drug Father         smoker     Heart Disease Brother        SOCIAL HISTORY:  Social History     Tobacco Use     Smoking status: Never     Smokeless tobacco: Never   Substance Use Topics     Alcohol use: Yes     Comment: 3-4 beers a week     Drug use: Not Currently     Types: Marijuana       ROS:  10 point ROS neg other than the symptoms noted above in the HPI.    Labs:  Reviewed.     Testing/Procedures:  ZioPatch:   Persistent atrial fibrillation. 18 runs of NSVT up to 8 beats. Average HR 84 bpm.    ECHOCARDIOGRAM:   Left ventricular function is decreased. The ejection fraction is 45-50% (mildly reduced). Biplane LVEF is 49%.  Right ventricular function, chamber size, wall motion, and thickness are normal.  No significant valvular abnormalities were noted.  This study was compared with the study from 3/11/21 ([facility]) .  No significant changes noted.      Assessment and Plan:   Permanent atrial fibrillation  Nonischemic Cardiomyopathy  Exertional dyspnea    Presenting for follow up of exertion dyspnea. He has permanent atrial fibrillation which is well rate controlled based on the ziopatch. His LV function is unchanged but his RA pressure was estimated at 8 mmHg so it is possible that there is a component of heart failure. Reassuringly his symptoms have improved. Will defer on addition of any diuretics at this time, but if his symptoms of dyspnea worse could consider starting low dose lasix. Will continue his other cardiac medications.    He will follow up in the [place] clinic in 6 months or sooner if needed.       Telephone Visit Duration: 6 minutes        I have interviewed patient. I have reviewed the laboratory tests, imaging, and other  investigations. I have reviewed the management plan with the patient. I discussed with the team and agree with the findings and plan in this resident/fellow/nurse practitioner's note. In addition, changes in the assessment and plan have been incorporated into the note by myself, as to make it a single cohesive document.       Tami Calabrese MD, MS  Cardiology/Cardiac EP Attending Staff

## 2023-03-13 DIAGNOSIS — N52.01 ERECTILE DYSFUNCTION DUE TO ARTERIAL INSUFFICIENCY: ICD-10-CM

## 2023-03-13 DIAGNOSIS — I10 HYPERTENSION GOAL BP (BLOOD PRESSURE) < 140/90: ICD-10-CM

## 2023-03-13 DIAGNOSIS — I48.19 PERSISTENT ATRIAL FIBRILLATION (H): Primary | ICD-10-CM

## 2023-03-13 RX ORDER — SILDENAFIL CITRATE 20 MG/1
TABLET ORAL
Qty: 20 TABLET | Refills: 0 | OUTPATIENT
Start: 2023-03-13

## 2023-03-16 RX ORDER — LISINOPRIL 5 MG/1
5 TABLET ORAL DAILY
Qty: 30 TABLET | Refills: 0 | Status: SHIPPED | OUTPATIENT
Start: 2023-03-16 | End: 2023-04-04

## 2023-03-17 ENCOUNTER — MYC MEDICAL ADVICE (OUTPATIENT)
Dept: CARDIOLOGY | Facility: CLINIC | Age: 68
End: 2023-03-17
Payer: MEDICARE

## 2023-03-17 ENCOUNTER — TELEPHONE (OUTPATIENT)
Dept: CARDIOLOGY | Facility: CLINIC | Age: 68
End: 2023-03-17
Payer: MEDICARE

## 2023-03-17 DIAGNOSIS — I10 HYPERTENSION GOAL BP (BLOOD PRESSURE) < 140/90: ICD-10-CM

## 2023-03-17 DIAGNOSIS — I48.19 PERSISTENT ATRIAL FIBRILLATION (H): ICD-10-CM

## 2023-03-17 NOTE — TELEPHONE ENCOUNTER
[facility] Call Center    Phone Message    May a detailed message be left on voicemail: yes     Reason for Call: Other: Cameron is trying to get his medications refilled but the pharmacy will only give him an 8 day supply because he needs to be seen by Dr. Calabrese but Cameron states he was just seen a few months ago by Dr. Calabrese and is confused as to why he can't get his medications. Offered to schedule appt but Cameron wants to speak with his care team. Please reach out to Cameron to discuss. Thank you!     Action Taken: Other: Cardiology    Travel Screening: Not Applicable     Thank you!  [facility]

## 2023-03-17 NOTE — TELEPHONE ENCOUNTER
Spoke to pt:   Recently retired and found that his coverage under medicare has a high deductible. He spoke to medicare and changed the plan for better coverage.   This will go into effect 4/1/2023.     Pt would like refills for lisinopril, apixaban, diltiazem, atorvastatin, metoprolol, sildenafil to be sent on 4/1/2023 when his new plan is in effect.     Pt has enough medications to get through to first week of April except with apixaban. Pt has never used the 30 day free trial coupon.   Instructed pt to go to medication site and click on options to get 30 day coupon for this medication.   We will send 30 day supply of apixaban to pharmacy.   Pt verbalized understanding.    Instructed pt to call and schedule follow up appt with PCP. He will be due for repeat labs soon.   Pt agreed and verbalized understanding.

## 2023-03-21 NOTE — TELEPHONE ENCOUNTER
Rx voucher called into pharmacy and it ran through for a free 30 day supply.  Patient informed and verbalized understanding.    Will postpone this call to 4/1/23.  Will sent Rx's then.  Pt informed.    Tina Bolanos RN  Cardiology Care Coordinator  [facility]  [phone] option 1

## 2023-04-04 NOTE — TELEPHONE ENCOUNTER
Please se mychart message.  Ok to close this encounter.    Tina Bolanos, RN  Cardiology Care Coordinator  [facility]  [phone] option 1

## 2023-04-23 ENCOUNTER — HEALTH MAINTENANCE LETTER (OUTPATIENT)
Age: 68
End: 2023-04-23

## 2023-06-12 ENCOUNTER — OFFICE VISIT (OUTPATIENT)
Dept: CARDIOLOGY | Facility: CLINIC | Age: 68
End: 2023-06-12
Payer: COMMERCIAL

## 2023-06-12 VITALS
HEART RATE: 81 BPM | DIASTOLIC BLOOD PRESSURE: 77 MMHG | OXYGEN SATURATION: 99 % | WEIGHT: 191.6 LBS | SYSTOLIC BLOOD PRESSURE: 116 MMHG | BODY MASS INDEX: 26.35 KG/M2

## 2023-06-12 DIAGNOSIS — I48.21 PERMANENT ATRIAL FIBRILLATION (H): Primary | ICD-10-CM

## 2023-06-12 DIAGNOSIS — I42.8 NONISCHEMIC CARDIOMYOPATHY (H): ICD-10-CM

## 2023-06-12 DIAGNOSIS — I48.19 PERSISTENT ATRIAL FIBRILLATION (H): ICD-10-CM

## 2023-06-12 PROCEDURE — 99214 OFFICE O/P EST MOD 30 MIN: CPT | Performed by: INTERNAL MEDICINE

## 2023-06-12 RX ORDER — WARFARIN SODIUM 5 MG/1
5 TABLET ORAL DAILY
Qty: 30 TABLET | Refills: 0 | Status: SHIPPED | OUTPATIENT
Start: 2023-06-12 | End: 2023-07-31

## 2023-06-12 NOTE — PATIENT INSTRUCTIONS
Thank you for coming to the [facility] at [place]; please note the following instructions:    1. Follow-up in 6 months with an echo prior    2.  Finish your home supply of Eliquis then STOP and switch to coumadin.  Start at 5 mg daily for 3 days then INR on the 4th day.        If you have any questions regarding your visit, please contact your care team:     CARDIOLOGY  TELEPHONE NUMBER   Tina VILLEGAS, Registered Nurse  Olena RODRIGUEZ, Registered Nurse  Tania RODAS, Registered Nurse  Sindy SHERMAN, Registered Medical Assistant  Radha WU, Certified Medical Assistant  Yuli ANDRES, Visit Facilitator [phone] (select option 1)    *After hours: [phone]   For Scheduling Appts:     [phone] (select option 1)    *After hours: [phone]   For the Device Clinic (Pacemakers and ICD's)  Ana WHITNEY, Registered Nurse   During business hours: [phone]    *After business hours:  [phone] (select option 4)      Normal test result notifications will be released via [facility] or mailed within 7 business days.  All other test results, will be communicated via telephone once reviewed by your cardiologist.    If you need a medication refill, please contact your pharmacy.  Please allow 3 business days for your refill to be completed.    As always, thank you for trusting us with your health care needs!

## 2023-06-12 NOTE — PROGRESS NOTES
HPI: Purpose of visit: Follow-up of atrial fibrillation    Patient is a 67-year-old gentleman with a history of permanent atrial fibrillation, hx of pulmonary vein isolation in March 2021 and nonischemic cardiomyopathy.    Patient's last visit with me was in December 2022.  Since the last visit, patient has not required.  He has been doing well from an atrial fibrillation standpoint.  He did not report any symptoms of palpitations, irregular heartbeat sensation, exertional dyspnea, exertional angina, frequent lightheadedness, presyncope or syncope.    Patient is taking metoprolol  mg once daily and diltiazem  mg once daily for rate control.  He is taking apixaban 5 mg twice daily but would like to switch to warfarin because of insurance issues.    PAST MEDICAL HISTORY:  Past Medical History:   Diagnosis Date     Adhesive capsulitis of left shoulder 4/13/2019     Atrial fibrillation (H)      History of alcohol abuse      Hypertension      MEDICAL HISTORY OF -     cardioversion X 2     Other primary cardiomyopathies     Cardiomyopathy     Peyronie's disease        CURRENT MEDICATIONS:  Current Outpatient Medications   Medication Sig Dispense Refill     aspirin 81 MG tablet Take 1 tablet by mouth daily.       atorvastatin (LIPITOR) 40 MG tablet Take 1 tablet (40 mg) by mouth daily for cholesterol Pharmacy ok to hold prescription until due 90 tablet 3     diltiazem ER COATED BEADS (CARDIZEM CD/CARTIA XT) 240 MG 24 hr capsule Take 1 capsule (240 mg) by mouth daily 90 capsule 1     fish oil-omega-3 fatty acids 1000 MG capsule Take 2 g by mouth daily.       lisinopril (ZESTRIL) 5 MG tablet Take 1 tablet (5 mg) by mouth daily 90 tablet 1     metoprolol succinate ER (TOPROL XL) 100 MG 24 hr tablet Take 1 tablet (100 mg) by mouth daily 90 tablet 1     MULTI-VITAMIN OR TABS 1 TABLET DAILY       sildenafil (REVATIO) 20 MG tablet TAKE ONE TO TWO TABLETS BY MOUTH ONCE DAILY AS NEEDED FOR  ED,  NEVER  USE  WITH   NITROGLYCERIN,  TERAZOSIN  OR  DOXAZOSIN 20 tablet 3     warfarin ANTICOAGULANT (COUMADIN) 5 MG tablet Take 1 tablet (5 mg) by mouth daily Or as instructed by the INR clinic 30 tablet 0     Cholecalciferol (VITAMIN D) 400 UNITS capsule Take 1 capsule by mouth daily. (Patient not taking: Reported on 10/24/2022)         PAST SURGICAL HISTORY:  Past Surgical History:   Procedure Laterality Date     APPENDECTOMY       EP ABLATION FOCAL AFIB N/A 3/12/2021    Procedure: EP ABLATION FOCAL AFIB;  Surgeon: Tami Calabrese MD;  Location:  HEART CARDIAC CATH LAB       ALLERGIES:     Allergies   Allergen Reactions     Aspartame Hives     Hands and feet swell, gets rash     Other Food Allergy      Mayonnaise per pt       FAMILY HISTORY:  - Premature coronary artery disease  - Atrial fibrillation  - Sudden cardiac death     SOCIAL HISTORY:  Social History     Tobacco Use     Smoking status: Never     Smokeless tobacco: Never   Substance Use Topics     Alcohol use: Not Currently     Drug use: Not Currently     Types: Marijuana       ROS:   Constitutional: No fever, chills, or sweats. Weight stable.   ENT: No visual disturbance, ear ache, epistaxis, sore throat.   Cardiovascular: As per HPI.   Respiratory: No cough, hemoptysis.    GI: No nausea, vomiting, hematemesis, melena, or hematochezia.   : No hematuria.   Integument: Negative.   Psychiatric: Negative.   Hematologic:  Easy bruising, no easy bleeding.  Neuro: Negative.   Endocrinology: No significant heat or cold intolerance   Musculoskeletal: No myalgia.    Exam:  /77 (BP Location: Right arm, Patient Position: Sitting, Cuff Size: Adult Regular)   Pulse 81   Wt 86.9 kg (191 lb 9.6 oz)   SpO2 99%   BMI 26.35 kg/m    GENERAL APPEARANCE: healthy, alert and no distress  HEENT: no icterus, no xanthelasmas, normal pupil size and reaction, normal palate, mucosa moist, no central cyanosis  NECK: no adenopathy, no asymmetry, masses, or scars, thyroid normal to palpation  and no bruits, JVP not elevated  RESPIRATORY: lungs clear to auscultation - no rales, rhonchi or wheezes, no use of accessory muscles, no retractions, respirations are unlabored, normal respiratory rate  CARDIOVASCULAR: irregular rhythm, normal S1 with physiologic split S2, no S3 or S4 and no murmur, click or rub, precordium quiet with normal PMI.  ABDOMEN: soft, non tender, without hepatosplenomegaly, no masses palpable, bowel sounds normal, aorta not enlarged by palpation, no abdominal bruits  EXTREMITIES: peripheral pulses normal, no edema, no bruits  NEURO: alert and oriented to person/place/time, normal speech, gait and affect  VASC: Radial, femoral, dorsalis pedis and posterior tibialis pulses are normal in volumes and symmetric bilaterally. No bruits are heard.  SKIN: no ecchymoses, no rashes    Labs:  CBC RESULTS:   Lab Results   Component Value Date    WBC 8.3 10/21/2022    WBC 9.1 03/12/2021    RBC 4.42 10/21/2022    RBC 4.76 03/12/2021    HGB 14.8 10/21/2022    HGB 15.7 03/12/2021    HCT 43.5 10/21/2022    HCT 46.5 03/12/2021    MCV 98 10/21/2022    MCV 98 03/12/2021    MCH 33.5 (H) 10/21/2022    MCH 33.0 03/12/2021    MCHC 34.0 10/21/2022    MCHC 33.8 03/12/2021    RDW 14.0 10/21/2022    RDW 13.7 03/12/2021     10/21/2022     03/12/2021       BMP RESULTS:  Lab Results   Component Value Date     10/21/2022     03/12/2021    POTASSIUM 4.4 10/21/2022    POTASSIUM 4.4 03/12/2021    CHLORIDE 106 10/21/2022    CHLORIDE 109 03/12/2021    CO2 31 10/21/2022    CO2 26 03/12/2021    ANIONGAP 3 10/21/2022    ANIONGAP 5 03/12/2021    GLC 96 10/21/2022     (H) 03/12/2021    BUN 16 10/21/2022    BUN 17 03/12/2021    CR 0.76 10/21/2022    CR 0.75 03/12/2021    GFRESTIMATED >90 10/21/2022    GFRESTIMATED >90 03/12/2021    GFRESTBLACK >90 03/12/2021    IRIS 9.1 10/21/2022    IRIS 9.2 03/12/2021        INR RESULTS:  Lab Results   Component Value Date    INR 1.10 06/28/2021    INR 2.10 (H)  06/15/2021    INR 2.40 (H) 05/18/2021    INR 2.00 (H) 04/27/2021       Procedures:      Assessment and Plan:     Permanent atrial fibrillation-rate well controlled by diltiazem and metoprolol    Nonischemic cardiomyopathy-EF 45 to 50% in October 2022    It is encouraging that patient is doing very well from the standpoint of atrial fibrillation nonischemic cardiomyopathy.  We will continue current medications and make a switch to warfarin when patient runs out of his current prescription of apixaban.    We will see patient again in 6 months by video visit.  Prior to that visit, patient will undergo a transthoracic echocardiogram to check the left ventricular ejection fraction.  All questions and concerns were addressed and the patient was happy with the plan.      CC  Patient Care Team:  Pablo Benz MD as PCP - General (Family Practice)  Tami Calabrese MD as Assigned Heart and Vascular Provider  Pablo Benz MD as Assigned PCP

## 2023-06-12 NOTE — LETTER
6/12/2023      RE: Cameron Mariano  [id number]  [place] [facility] [address]       Dear Colleague,    Thank you for the opportunity to participate in the care of your patient, Cameron Mariano, at the [facility] at [facility]. Please see a copy of my visit note below.    HPI: Purpose of visit: Follow-up of atrial fibrillation    Patient is a 67-year-old gentleman with a history of permanent atrial fibrillation, hx of pulmonary vein isolation in March 2021 and nonischemic cardiomyopathy.    Patient's last visit with me was in December 2022.  Since the last visit, patient has not required.  He has been doing well from an atrial fibrillation standpoint.  He did not report any symptoms of palpitations, irregular heartbeat sensation, exertional dyspnea, exertional angina, frequent lightheadedness, presyncope or syncope.    Patient is taking metoprolol  mg once daily and diltiazem  mg once daily for rate control.  He is taking apixaban 5 mg twice daily but would like to switch to warfarin because of insurance issues.    PAST MEDICAL HISTORY:  Past Medical History:   Diagnosis Date    Adhesive capsulitis of left shoulder 4/13/2019    Atrial fibrillation (H)     History of alcohol abuse     Hypertension     MEDICAL HISTORY OF -     cardioversion X 2    Other primary cardiomyopathies     Cardiomyopathy    Peyronie's disease        CURRENT MEDICATIONS:  Current Outpatient Medications   Medication Sig Dispense Refill    aspirin 81 MG tablet Take 1 tablet by mouth daily.      atorvastatin (LIPITOR) 40 MG tablet Take 1 tablet (40 mg) by mouth daily for cholesterol Pharmacy ok to hold prescription until due 90 tablet 3    diltiazem ER COATED BEADS (CARDIZEM CD/CARTIA XT) 240 MG 24 hr capsule Take 1 capsule (240 mg) by mouth daily 90 capsule 1    fish oil-omega-3 fatty acids 1000 MG capsule Take 2 g by mouth daily.      lisinopril (ZESTRIL) 5 MG  tablet Take 1 tablet (5 mg) by mouth daily 90 tablet 1    metoprolol succinate ER (TOPROL XL) 100 MG 24 hr tablet Take 1 tablet (100 mg) by mouth daily 90 tablet 1    MULTI-VITAMIN OR TABS 1 TABLET DAILY      sildenafil (REVATIO) 20 MG tablet TAKE ONE TO TWO TABLETS BY MOUTH ONCE DAILY AS NEEDED FOR  ED,  NEVER  USE  WITH  NITROGLYCERIN,  TERAZOSIN  OR  DOXAZOSIN 20 tablet 3    warfarin ANTICOAGULANT (COUMADIN) 5 MG tablet Take 1 tablet (5 mg) by mouth daily Or as instructed by the INR clinic 30 tablet 0    Cholecalciferol (VITAMIN D) 400 UNITS capsule Take 1 capsule by mouth daily. (Patient not taking: Reported on 10/24/2022)         PAST SURGICAL HISTORY:  Past Surgical History:   Procedure Laterality Date    APPENDECTOMY      EP ABLATION FOCAL AFIB N/A 3/12/2021    Procedure: EP ABLATION FOCAL AFIB;  Surgeon: Tami Calabrese MD;  Location:  HEART CARDIAC CATH LAB       ALLERGIES:     Allergies   Allergen Reactions    Aspartame Hives     Hands and feet swell, gets rash    Other Food Allergy      Mayonnaise per pt       FAMILY HISTORY:  - Premature coronary artery disease  - Atrial fibrillation  - Sudden cardiac death     SOCIAL HISTORY:  Social History     Tobacco Use    Smoking status: Never    Smokeless tobacco: Never   Substance Use Topics    Alcohol use: Not Currently    Drug use: Not Currently     Types: Marijuana       ROS:   Constitutional: No fever, chills, or sweats. Weight stable.   ENT: No visual disturbance, ear ache, epistaxis, sore throat.   Cardiovascular: As per HPI.   Respiratory: No cough, hemoptysis.    GI: No nausea, vomiting, hematemesis, melena, or hematochezia.   : No hematuria.   Integument: Negative.   Psychiatric: Negative.   Hematologic:  Easy bruising, no easy bleeding.  Neuro: Negative.   Endocrinology: No significant heat or cold intolerance   Musculoskeletal: No myalgia.    Exam:  /77 (BP Location: Right arm, Patient Position: Sitting, Cuff Size: Adult Regular)   Pulse 81    Wt 86.9 kg (191 lb 9.6 oz)   SpO2 99%   BMI 26.35 kg/m    GENERAL APPEARANCE: healthy, alert and no distress  HEENT: no icterus, no xanthelasmas, normal pupil size and reaction, normal palate, mucosa moist, no central cyanosis  NECK: no adenopathy, no asymmetry, masses, or scars, thyroid normal to palpation and no bruits, JVP not elevated  RESPIRATORY: lungs clear to auscultation - no rales, rhonchi or wheezes, no use of accessory muscles, no retractions, respirations are unlabored, normal respiratory rate  CARDIOVASCULAR: irregular rhythm, normal S1 with physiologic split S2, no S3 or S4 and no murmur, click or rub, precordium quiet with normal PMI.  ABDOMEN: soft, non tender, without hepatosplenomegaly, no masses palpable, bowel sounds normal, aorta not enlarged by palpation, no abdominal bruits  EXTREMITIES: peripheral pulses normal, no edema, no bruits  NEURO: alert and oriented to person/place/time, normal speech, gait and affect  VASC: Radial, femoral, dorsalis pedis and posterior tibialis pulses are normal in volumes and symmetric bilaterally. No bruits are heard.  SKIN: no ecchymoses, no rashes    Labs:  CBC RESULTS:   Lab Results   Component Value Date    WBC 8.3 10/21/2022    WBC 9.1 03/12/2021    RBC 4.42 10/21/2022    RBC 4.76 03/12/2021    HGB 14.8 10/21/2022    HGB 15.7 03/12/2021    HCT 43.5 10/21/2022    HCT 46.5 03/12/2021    MCV 98 10/21/2022    MCV 98 03/12/2021    MCH 33.5 (H) 10/21/2022    MCH 33.0 03/12/2021    MCHC 34.0 10/21/2022    MCHC 33.8 03/12/2021    RDW 14.0 10/21/2022    RDW 13.7 03/12/2021     10/21/2022     03/12/2021       BMP RESULTS:  Lab Results   Component Value Date     10/21/2022     03/12/2021    POTASSIUM 4.4 10/21/2022    POTASSIUM 4.4 03/12/2021    CHLORIDE 106 10/21/2022    CHLORIDE 109 03/12/2021    CO2 31 10/21/2022    CO2 26 03/12/2021    ANIONGAP 3 10/21/2022    ANIONGAP 5 03/12/2021    GLC 96 10/21/2022     (H) 03/12/2021    BUN  16 10/21/2022    BUN 17 03/12/2021    CR 0.76 10/21/2022    CR 0.75 03/12/2021    GFRESTIMATED >90 10/21/2022    GFRESTIMATED >90 03/12/2021    GFRESTBLACK >90 03/12/2021    IRIS 9.1 10/21/2022    IRIS 9.2 03/12/2021        INR RESULTS:  Lab Results   Component Value Date    INR 1.10 06/28/2021    INR 2.10 (H) 06/15/2021    INR 2.40 (H) 05/18/2021    INR 2.00 (H) 04/27/2021       Procedures:      Assessment and Plan:     Permanent atrial fibrillation-rate well controlled by diltiazem and metoprolol    Nonischemic cardiomyopathy-EF 45 to 50% in October 2022    It is encouraging that patient is doing very well from the standpoint of atrial fibrillation nonischemic cardiomyopathy.  We will continue current medications and make a switch to warfarin when patient runs out of his current prescription of apixaban.    We will see patient again in 6 months by video visit.  Prior to that visit, patient will undergo a transthoracic echocardiogram to check the left ventricular ejection fraction.  All questions and concerns were addressed and the patient was happy with the plan.      CC  Patient Care Team:  Pablo Benz MD as PCP - General (Family Practice)  Tami Calabrese MD as Assigned Heart and Vascular Provider  Pablo Benz MD as Assigned PCP          Please do not hesitate to contact me if you have any questions/concerns.     Sincerely,     Tami Calabrese MD

## 2023-06-12 NOTE — NURSING NOTE
"Chief Complaint   Patient presents with     Follow Up     6/12/23 Dr. Calabrese 6 month in-person persistent atrial fibrillation return       Initial /77 (BP Location: Right arm, Patient Position: Sitting, Cuff Size: Adult Regular)   Pulse 81   Wt 86.9 kg (191 lb 9.6 oz)   SpO2 99%   BMI 26.35 kg/m   Estimated body mass index is 26.35 kg/m  as calculated from the following:    Height as of 3/12/21: 1.816 m (5' 11.5\").    Weight as of this encounter: 86.9 kg (191 lb 9.6 oz)..  BP completed using cuff size: regular    MAMI Barrett    "

## 2023-06-15 ENCOUNTER — TELEPHONE (OUTPATIENT)
Dept: ANTICOAGULATION | Facility: CLINIC | Age: 68
End: 2023-06-15
Payer: COMMERCIAL

## 2023-06-15 DIAGNOSIS — I48.19 PERSISTENT ATRIAL FIBRILLATION (H): Primary | ICD-10-CM

## 2023-06-15 NOTE — TELEPHONE ENCOUNTER
"ANTICOAGULATION  MANAGEMENT: NEW REFERRAL      SUBJECTIVE/OBJECTIVE     Cameron Mariano, a 67 year old male  is newly referred to [facility].    Anticoagulation:    Previously on warfarin: Yes. Was on warfarin prior to June 2021 and then switched to a DOAC. Now he has new insurance and it's too expensive.  Warfarin initiation date (approximate): one month from referral   Indication(s): Atrial Fibrillation   Goal Range: 2-3   Anticoagulation Bridge/Overlap: No and No, complete therapy with Apixaban (Eliquis) and then transition to warfarin per cards notes on 6-12-23.   Referring provider: from heartcare provider    General Dietary/Social Hx:    Typical vitamin K intake: low; consistent     Other dietary considerations: None     Social History:   Social History     Tobacco Use     Smoking status: Never     Smokeless tobacco: Never   Substance Use Topics     Alcohol use: Not Currently     Drug use: Not Currently     Types: Marijuana       In the past 2 weeks, patient estimates taking medications as instructed % of time: will sometimes miss if out of town or at his cabin    Results:        No results for input(s): INR, [id number], F2, ALMWH in the last 168 hours.    Wt Readings from Last 2 Encounters:   06/12/23 86.9 kg (191 lb 9.6 oz)   10/24/22 87.9 kg (193 lb 12.8 oz)      Estimated body mass index is 26.35 kg/m  as calculated from the following:    Height as of 3/12/21: 1.816 m (5' 11.5\").    Weight as of 6/12/23: 86.9 kg (191 lb 9.6 oz).  Lab Results   Component Value Date    AST 22 09/01/2020     Lab Results   Component Value Date    CR 0.76 10/21/2022     CrCl cannot be calculated (Patient's most recent lab result is older than the maximum 30 days allowed.).    ASSESSMENT       Goal INR 2-3, standard for indication(s) above  Establishing initial warfarin maintenance dose (on warfarin < 30 days)     Starting warfarin dose is appropriate for patient's anticipated sensitivity to " warfarin    PLAN     Dosing Instructions: call [facility] when you have one week left of eliquis with INR in 3 days from starting warfarin      Summary  As of 6/15/2023    Next INR check:               Education provided:     Taking warfarin: take warfarin at same time each day; preferably in the evening, prescribed tablet strength and color, importance of following [facility] instructions vs instructions on the prescription bottle and Importance of taking warfarin as instructed    Goal range and lab monitoring: goal range and significance of current result, Importance of therapeutic range, Importance of following up at instructed interval and frequency of lab work when starting warfarin and importance of following up when instructed (extends after stability established)    Dietary considerations: importance of consistent vitamin K intake, Impact of protein intake on INR  and importance of notifying [facility] to changes in diet    Healthy lifestyle considerations: not discussed    Importance of notifying anticoagulation clinic for: changes in medications; a sooner lab recheck maybe needed, diarrhea, nausea/vomiting, reduced intake, cold/flu, and/or infections; a sooner lab recheck maybe needed and upcoming surgeries and procedures 2 weeks in advance    Contact [phone]  with any changes, questions or concerns.     Education still needed:     patient was on warfarin two years ago. Will mail new education packet as a refresher      Telephone call with Cameron who verbalizes understanding and agrees to plan    Pt will call [facility] when he has one week of eliquis left    Standing orders placed in Epic: Point of Care INR (Lab 5000)    Plan made per [facility] anticoagulation protocol    Sonia Baez RN  Anticoagulation Clinic  6/15/2023

## 2023-06-26 ENCOUNTER — OFFICE VISIT (OUTPATIENT)
Dept: FAMILY MEDICINE | Facility: CLINIC | Age: 68
End: 2023-06-26
Payer: COMMERCIAL

## 2023-06-26 VITALS
HEART RATE: 63 BPM | OXYGEN SATURATION: 95 % | DIASTOLIC BLOOD PRESSURE: 71 MMHG | RESPIRATION RATE: 14 BRPM | TEMPERATURE: 98.1 F | BODY MASS INDEX: 27.37 KG/M2 | HEIGHT: 70 IN | WEIGHT: 191.2 LBS | SYSTOLIC BLOOD PRESSURE: 103 MMHG

## 2023-06-26 DIAGNOSIS — I48.20 CHRONIC ATRIAL FIBRILLATION (H): ICD-10-CM

## 2023-06-26 DIAGNOSIS — I10 HYPERTENSION GOAL BP (BLOOD PRESSURE) < 140/90: ICD-10-CM

## 2023-06-26 DIAGNOSIS — Z12.5 SCREENING PSA (PROSTATE SPECIFIC ANTIGEN): ICD-10-CM

## 2023-06-26 DIAGNOSIS — Z00.00 ENCOUNTER FOR INITIAL ANNUAL WELLNESS VISIT IN MEDICARE PATIENT: Primary | ICD-10-CM

## 2023-06-26 DIAGNOSIS — E78.5 HYPERLIPIDEMIA LDL GOAL <130: ICD-10-CM

## 2023-06-26 DIAGNOSIS — I42.9 SECONDARY CARDIOMYOPATHY (H): ICD-10-CM

## 2023-06-26 LAB
ALBUMIN SERPL BCG-MCNC: 4.3 G/DL (ref 3.5–5.2)
ANION GAP SERPL CALCULATED.3IONS-SCNC: 8 MMOL/L (ref 7–15)
BUN SERPL-MCNC: 9.7 MG/DL (ref 8–23)
CALCIUM SERPL-MCNC: 9.4 MG/DL (ref 8.8–10.2)
CHLORIDE SERPL-SCNC: 103 MMOL/L (ref 98–107)
CHOLEST SERPL-MCNC: 225 MG/DL
CREAT SERPL-MCNC: 0.81 MG/DL (ref 0.67–1.17)
DEPRECATED HCO3 PLAS-SCNC: 27 MMOL/L (ref 22–29)
GFR SERPL CREATININE-BSD FRML MDRD: >90 ML/MIN/1.73M2
GLUCOSE SERPL-MCNC: 97 MG/DL (ref 70–99)
HDLC SERPL-MCNC: 63 MG/DL
LDLC SERPL CALC-MCNC: 149 MG/DL
NONHDLC SERPL-MCNC: 162 MG/DL
PHOSPHATE SERPL-MCNC: 3 MG/DL (ref 2.5–4.5)
POTASSIUM SERPL-SCNC: 4.6 MMOL/L (ref 3.4–5.3)
PSA SERPL DL<=0.01 NG/ML-MCNC: 1.01 NG/ML (ref 0–4.5)
SODIUM SERPL-SCNC: 138 MMOL/L (ref 136–145)
TRIGL SERPL-MCNC: 66 MG/DL

## 2023-06-26 PROCEDURE — 36415 COLL VENOUS BLD VENIPUNCTURE: CPT | Performed by: FAMILY MEDICINE

## 2023-06-26 PROCEDURE — G0402 INITIAL PREVENTIVE EXAM: HCPCS | Performed by: FAMILY MEDICINE

## 2023-06-26 PROCEDURE — 80061 LIPID PANEL: CPT | Performed by: FAMILY MEDICINE

## 2023-06-26 PROCEDURE — 80069 RENAL FUNCTION PANEL: CPT | Performed by: FAMILY MEDICINE

## 2023-06-26 PROCEDURE — G0103 PSA SCREENING: HCPCS | Performed by: FAMILY MEDICINE

## 2023-06-26 PROCEDURE — 99214 OFFICE O/P EST MOD 30 MIN: CPT | Mod: 25 | Performed by: FAMILY MEDICINE

## 2023-06-26 RX ORDER — LISINOPRIL 5 MG/1
5 TABLET ORAL DAILY
Qty: 90 TABLET | Refills: 3 | Status: SHIPPED | OUTPATIENT
Start: 2023-06-26 | End: 2024-08-21

## 2023-06-26 RX ORDER — METOPROLOL SUCCINATE 100 MG/1
100 TABLET, EXTENDED RELEASE ORAL DAILY
Qty: 90 TABLET | Refills: 3 | Status: SHIPPED | OUTPATIENT
Start: 2023-06-26 | End: 2024-08-21

## 2023-06-26 RX ORDER — DILTIAZEM HYDROCHLORIDE 240 MG/1
240 CAPSULE, COATED, EXTENDED RELEASE ORAL DAILY
Qty: 90 CAPSULE | Refills: 3 | Status: SHIPPED | OUTPATIENT
Start: 2023-06-26 | End: 2024-08-27

## 2023-06-26 RX ORDER — ATORVASTATIN CALCIUM 40 MG/1
40 TABLET, FILM COATED ORAL DAILY
Qty: 90 TABLET | Refills: 3 | Status: SHIPPED | OUTPATIENT
Start: 2023-06-26 | End: 2024-09-26

## 2023-06-26 ASSESSMENT — ENCOUNTER SYMPTOMS
DYSURIA: 0
ARTHRALGIAS: 0
EYE PAIN: 0
CHILLS: 0
NERVOUS/ANXIOUS: 0
JOINT SWELLING: 0
NAUSEA: 0
CONSTIPATION: 0
PARESTHESIAS: 0
FREQUENCY: 0
MYALGIAS: 0
SORE THROAT: 0
HEADACHES: 0
FEVER: 0
ABDOMINAL PAIN: 0
HEMATOCHEZIA: 0
HEMATURIA: 0
DIARRHEA: 0
HEARTBURN: 0
SHORTNESS OF BREATH: 0
COUGH: 0
DIZZINESS: 0
PALPITATIONS: 0
WEAKNESS: 0

## 2023-06-26 ASSESSMENT — PAIN SCALES - GENERAL: PAINLEVEL: NO PAIN (0)

## 2023-06-26 ASSESSMENT — ACTIVITIES OF DAILY LIVING (ADL): CURRENT_FUNCTION: NO ASSISTANCE NEEDED

## 2023-06-26 NOTE — PROGRESS NOTES
"SUBJECTIVE:   Cameron is a 67 year old who presents for Preventive Visit.  Follow-up htn, high cholesterol, cardiolmyopathy, ed and a.fib. seeing cardiology.   Due for colonoscopy next year. permantent A.fib but heart overall doing ok.   Changed to coumadin because of cause and recently retired.  No nausea, vomiting or diarrhea or black/bloody stools.  No urine changes or hematuria. No std concerns.   Marriage going ok.   Grown kids - no grandkids.  Family is close. Wife health ok.   No chest pain or shortness of breath.  Exercise - needs a little more.   Active.  No mole changes or rashes.  No winter vacations.  No testicle pain/masses.   Emotionally doing ok. No falls.   Memory - short term not as good. Mild at this point. Wife not too concerned.  No headaches/no blurry vision.   Eye exam in winter. Dentist up to date.  Beer on weekends.       6/26/2023    10:03 AM   Additional Questions   Roomed by OSITO Kelly CMA     Are you in the first 12 months of your Medicare coverage?  Yes,  Visual Acuity:  Right Eye: 20/25   Left Eye: 20/20  Both Eyes: 20/20    Healthy Habits:     In general, how would you rate your overall health?  Good    Frequency of exercise:  None    Do you usually eat at least 4 servings of fruit and vegetables a day, include whole grains    & fiber and avoid regularly eating high fat or \"junk\" foods?  No    Taking medications regularly:  Yes    Medication side effects:  None    Ability to successfully perform activities of daily living:  No assistance needed    Home Safety:  No safety concerns identified    Hearing Impairment:  Difficulty following a conversation in a noisy restaurant or crowded room    In the past 6 months, have you been bothered by leaking of urine?  No    In general, how would you rate your overall mental or emotional health?  Good      PHQ-2 Total Score: 0    Additional concerns today:  No        Have you ever done Advance Care Planning? (For example, a Health Directive, POLST, or a " discussion with a medical provider or your loved ones about your wishes): No, advance care planning information given to patient to review.  Patient declined advance care planning discussion at this time.    Normal conversational hearing.   Fall risk  Fallen 2 or more times in the past year?: No  Any fall with injury in the past year?: No    Cognitive Screening   1) Repeat 3 items (Leader, Season, Table)    2) Clock draw: ABNORMAL numbers and hands in wrong position  3) 3 item recall: Recalls 1 object   Results: ABNORMAL clock, 1-2 items recalled: PROBABLE COGNITIVE IMPAIRMENT, **INFORM PROVIDER**    Mini-CogTM Copyright S Nani. Licensed by the author for use in [facility]; reprinted with permission (lazaro@[facility]). All rights reserved.      Do you have sleep apnea, excessive snoring or daytime drowsiness?: no    Reviewed and updated as needed this visit by clinical staff   Tobacco  Allergies  Meds              Reviewed and updated as needed this visit by Provider                 Social History     Tobacco Use     Smoking status: Never     Smokeless tobacco: Never   Substance Use Topics     Alcohol use: Not Currently             6/26/2023     9:54 AM   Alcohol Use   Prescreen: >3 drinks/day or >7 drinks/week? No     Do you have a current opioid prescription? No  Do you use any other controlled substances or medications that are not prescribed by a provider? None          PROBLEMS TO ADD ON...    Current providers sharing in care for this patient include:   Patient Care Team:  Pablo Benz MD as PCP - General (Family Practice)  Tami Calabrese MD as Assigned Heart and Vascular Provider  Pablo Benz MD as Assigned PCP    The following health maintenance items are reviewed in Epic and correct as of today:  Health Maintenance   Topic Date Due     ANNUAL REVIEW OF HM ORDERS  Never done     Pneumococcal Vaccine: 65+ Years (1 - PCV) Never done     ZOSTER IMMUNIZATION (1 of 2) Never done      "LIPID  05/08/2020     MEDICARE ANNUAL WELLNESS VISIT  07/18/2021     AORTIC ANEURYSM SCREENING (SYSTEM ASSIGNED)  07/18/2021     COVID-19 Vaccine (4 - Pfizer series) 02/27/2022     DTAP/TDAP/TD IMMUNIZATION (2 - Td or Tdap) 09/24/2022     ADVANCE CARE PLANNING  03/26/2023     INFLUENZA VACCINE (Season Ended) 09/01/2023     FALL RISK ASSESSMENT  06/26/2024     COLORECTAL CANCER SCREENING  02/28/2029     HEPATITIS C SCREENING  Completed     PHQ-2 (once per calendar year)  Completed     IPV IMMUNIZATION  Aged Out     MENINGITIS IMMUNIZATION  Aged Out     Lab work is in process    Review of Systems   Constitutional: Negative for chills and fever.   HENT: Positive for hearing loss. Negative for congestion, ear pain and sore throat.    Eyes: Negative for pain and visual disturbance.   Respiratory: Negative for cough and shortness of breath.    Cardiovascular: Negative for chest pain, palpitations and peripheral edema.   Gastrointestinal: Negative for abdominal pain, constipation, diarrhea, heartburn, hematochezia and nausea.   Genitourinary: Positive for impotence. Negative for dysuria, frequency, genital sores, hematuria, penile discharge and urgency.   Musculoskeletal: Negative for arthralgias, joint swelling and myalgias.   Skin: Positive for rash.   Neurological: Negative for dizziness, weakness, headaches and paresthesias.   Psychiatric/Behavioral: Negative for mood changes. The patient is not nervous/anxious.        OBJECTIVE:   /71   Pulse 63   Temp 98.1  F (36.7  C) (Oral)   Resp 14   Ht 1.778 m (5' 10\")   Wt 86.7 kg (191 lb 3.2 oz)   SpO2 95%   BMI 27.43 kg/m   Estimated body mass index is 27.43 kg/m  as calculated from the following:    Height as of this encounter: 1.778 m (5' 10\").    Weight as of this encounter: 86.7 kg (191 lb 3.2 oz).  Physical Exam  GENERAL: healthy, alert and no distress  EYES: Eyes grossly normal to inspection, PERRL and conjunctivae and sclerae normal  HENT: ear canals and " TM's normal, nose and mouth without ulcers or lesions  NECK: no adenopathy, no asymmetry, masses, or scars and thyroid normal to palpation  RESP: lungs clear to auscultation - no rales, rhonchi or wheezes  BREAST: normal without masses, tenderness or nipple discharge and no palpable axillary masses or adenopathy  CV: regular rate and rhythm, normal S1 S2, no S3 or S4, no murmur, click or rub, no peripheral edema and peripheral pulses strong  ABDOMEN: soft, nontender, no hepatosplenomegaly, no masses and bowel sounds normal   (male): patient deferred /rectal exams.  MS: no gross musculoskeletal defects noted, no edema  SKIN: no suspicious lesions or rashes  NEURO: Normal strength and tone, mentation intact and speech normal  PSYCH: mentation appears normal, affect normal/bright  LYMPH: no cervical, supraclavicular, axillary, or inguinal adenopathy      ASSESSMENT / PLAN:     ASSESSMENT / PLAN:  (Z00.00) Encounter for initial annual wellness visit in Medicare patient  (primary encounter diagnosis)  Comment: generally healthy and normal exam.   Plan: some concerns about memory. Patient will monitor. Recheck one year. Sooner if worse. Avoid ALCOHOL and keep active. Reviewed self mole/testicle check handout.  Repeat colonoscopy one year    (E78.5) Hyperlipidemia LDL goal <130  Comment: stable in past  Plan: Lipid panel reflex to direct LDL Fasting,         atorvastatin (LIPITOR) 40 MG tablet        Reveiwed risks and side effects of medication  Chest pain or shortness of breath to er.    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 Patient to switch to coumadin for cost.         COUNSELING:  Reviewed preventive health counseling, as reflected in patient instructions       Regular exercise       Healthy diet/nutrition       Vision screening       Hearing screening       Dental care       Bladder control       Fall risk prevention       Alcohol Use        Colon cancer screening       Prostate cancer screening      BMI:   Estimated body mass index is 27.43 kg/m  as calculated from the following:    Height as of this encounter: 1.778 m (5' 10\").    Weight as of this encounter: 86.7 kg (191 lb 3.2 oz).   Weight management plan: Discussed healthy diet and exercise guidelines      He reports that he has never smoked. He has never used smokeless tobacco.      Appropriate preventive services were discussed with this patient, including applicable screening as appropriate for cardiovascular disease, diabetes, osteopenia/osteoporosis, and glaucoma.  As appropriate for age/gender, discussed screening for colorectal cancer, prostate cancer, breast cancer, and cervical cancer. Checklist reviewing preventive services available has been given to the patient.    Reviewed patients plan of care and provided an AVS. The Intermediate Care Plan ( asthma action plan, low back pain action plan, and migraine action plan) for Cameron meets the Care Plan requirement. This Care Plan has been established and reviewed with the Patient.          Pablo Benz MD  [facility]    Identified Health Risks:    I have reviewed Opioid Use Disorder and Substance Use Disorder risk factors and made any needed referrals.     "

## 2023-07-05 ENCOUNTER — TELEPHONE (OUTPATIENT)
Dept: ANTICOAGULATION | Facility: CLINIC | Age: 68
End: 2023-07-05
Payer: COMMERCIAL

## 2023-07-05 DIAGNOSIS — I48.20 CHRONIC ATRIAL FIBRILLATION (H): Primary | ICD-10-CM

## 2023-07-05 NOTE — TELEPHONE ENCOUNTER
ANTICOAGULATION     Cameron Mariano is overdue for INR check.      Left message for patient to call and schedule lab appointment as soon as possible. If returning call, please schedule. Is a new referral, no INR yet, unsure if switched over to warfarin. Will route to provider as well    Jennifer Barriga RN

## 2023-07-05 NOTE — TELEPHONE ENCOUNTER
Referral INR cllnic done patient changing to warfarin because of cost.  Not sure if seeing cardiology INR clinic or wants to see ours. Pablo Benz MD

## 2023-07-06 NOTE — TELEPHONE ENCOUNTER
Attempted to call pt to discuss his new INR referral with no answer. I left a message asking him to call [facility] back to discuss. Will await callback.     Eleni Lr RN, BSN  [facility] Anticoagulation Team

## 2023-07-10 NOTE — TELEPHONE ENCOUNTER
Attempted to call pt to discuss his new INR referral with no answer. I left a message asking him to call [facility] back to discuss. Will await callback.      Eleni Lr RN, BSN  [facility] Anticoagulation Team

## 2023-07-13 NOTE — TELEPHONE ENCOUNTER
Hello. We have left 4 messages for this patient to call us and sent a mychart as well. He is not responding.     Can we discharge him or do you want to attempt to reach him?    Sonia Baez RN, BSN, PHN

## 2023-07-17 ENCOUNTER — TELEPHONE (OUTPATIENT)
Dept: ANTICOAGULATION | Facility: CLINIC | Age: 68
End: 2023-07-17
Payer: COMMERCIAL

## 2023-07-17 DIAGNOSIS — I48.19 PERSISTENT ATRIAL FIBRILLATION (H): Primary | ICD-10-CM

## 2023-07-17 DIAGNOSIS — I48.20 CHRONIC ATRIAL FIBRILLATION (H): ICD-10-CM

## 2023-07-17 NOTE — TELEPHONE ENCOUNTER
ANTICOAGULATION     Cameron Mariano is overdue for INR check.      states he has 22 pills of Eliquis and will call us a few days before he is out. will place on reminder list for Aug 2    Jennifer Barriga RN

## 2023-07-25 NOTE — TELEPHONE ENCOUNTER
1:32 PM    Patient had called in today requesting a call back regarding the transition from Eliquis to Warfarin and when he will need an INR. Writer left a VM and requested the patient to call back.    Mary Alice Sotelo RN, BSN, N  Anticoagulation Clinic   [phone]

## 2023-07-25 NOTE — TELEPHONE ENCOUNTER
1:57 PM    Patient has 4 days of Eliquis left. He will start to overlap with the Warfarin tonight. INR is scheduled for Friday. Patient will be out of town 8/2-8/6. Writer did not do new education assessment. Writer will have a packet mailed out.    Mary Alice Sotelo RN, BSN, PHN  Anticoagulation Clinic   [phone]

## 2023-07-25 NOTE — TELEPHONE ENCOUNTER
Patient called in to talk with the coumadin nurses to discuss stopping one medication and starting a new one and when to check his INR. Please call [phone]    Returned call and left message that new prep trilyte was called into the pharmacy.

## 2023-07-25 NOTE — TELEPHONE ENCOUNTER
1:29 PM    See TE from 7/17/23.    Mary Alice Sotelo RN, BSN, PHN  Anticoagulation Clinic   [phone]

## 2023-07-28 ENCOUNTER — LAB (OUTPATIENT)
Dept: LAB | Facility: CLINIC | Age: 68
End: 2023-07-28
Payer: COMMERCIAL

## 2023-07-28 ENCOUNTER — ANTICOAGULATION THERAPY VISIT (OUTPATIENT)
Dept: ANTICOAGULATION | Facility: CLINIC | Age: 68
End: 2023-07-28

## 2023-07-28 DIAGNOSIS — I48.19 PERSISTENT ATRIAL FIBRILLATION (H): ICD-10-CM

## 2023-07-28 DIAGNOSIS — I48.19 PERSISTENT ATRIAL FIBRILLATION (H): Primary | ICD-10-CM

## 2023-07-28 DIAGNOSIS — I48.20 CHRONIC ATRIAL FIBRILLATION (H): ICD-10-CM

## 2023-07-28 LAB — INR BLD: 1.4 (ref 0.9–1.1)

## 2023-07-28 PROCEDURE — 36416 COLLJ CAPILLARY BLOOD SPEC: CPT

## 2023-07-28 PROCEDURE — 85610 PROTHROMBIN TIME: CPT

## 2023-07-28 NOTE — PROGRESS NOTES
ANTICOAGULATION MANAGEMENT     Cameron Mariano 68 year old male is on warfarin with {Ther/Sub/Supra:207631} INR result. (Goal INR {Anticoag Goal:227669})    Recent labs: (last 7 days)     07/28/23  1258   INR 1.4*       ASSESSMENT     {accassessment:272278}       PLAN     {INRPLAN:182571}

## 2023-07-28 NOTE — PROGRESS NOTES
"ANTICOAGULATION  MANAGEMENT: NEW REFERRAL      SUBJECTIVE/OBJECTIVE     Cameron Mariano, a 68 year old male  is newly referred to [facility].    Anticoagulation:    Previously on warfarin: No, has been on Apixaban (Eliquis); transitioning to warfarin.was on warfain 2 years ago  Warfarin initiation date (approximate): 7/25/23   Indication(s): Atrial Fibrillation   Goal Range: 2.0-3.0   Anticoagulation Bridge/Overlap: No, completed therapy with Apixaban (Eliquis) and transitioning to warfarin.   Referring provider: from PCP    General Dietary/Social Hx:    Typical vitamin K intake: low; consistent one serving greens weekly    Other dietary considerations:  uses whey powder and has once a week,   Takes mvi daily that has 20mcg of vit k     Social History: Typical alcohol intake: occasional beer    In the past 2 weeks, patient estimates taking medications as instructed % of time: 100    Results:        Recent labs: (last 7 days)     07/28/23  1258   INR 1.4*       Wt Readings from Last 2 Encounters:   06/26/23 86.7 kg (191 lb 3.2 oz)   06/12/23 86.9 kg (191 lb 9.6 oz)      Estimated body mass index is 27.43 kg/m  as calculated from the following:    Height as of 6/26/23: 1.778 m (5' 10\").    Weight as of 6/26/23: 86.7 kg (191 lb 3.2 oz).  Lab Results   Component Value Date    AST 22 09/01/2020     Lab Results   Component Value Date    CR 0.81 06/26/2023     Estimated Creatinine Clearance: 108.5 mL/min (based on SCr of 0.81 mg/dL).    ASSESSMENT     Goal INR 2-3, standard for indication(s) above    ANTICOAGULATION MANAGEMENT     Cameron Mariano 68 year old male is on warfarin with subtherapeutic INR result. (Goal INR 2.0-3.0)    Recent labs: (last 7 days)     07/28/23  1258   INR 1.4*       ASSESSMENT     Source(s): Chart Review and Patient/Caregiver Call     Warfarin doses taken: Warfarin taken as instructed  Diet: No new diet changes identified  Medication/supplement changes: None noted  New " illness, injury, or hospitalization: No  Signs or symptoms of bleeding or clotting: No  Previous result:  new start  Additional findings: New start on day 4 of warfarin       PLAN     Recommended plan for no diet, medication or health factor changes affecting INR     Dosing Instructions: Continue your current warfarin dose with next INR in 3 days       Summary  As of 7/28/2023      Full warfarin instructions:  5 mg every day   Next INR check:  7/31/2023               Telephone call with Cameron who verbalizes understanding and agrees to plan    Lab visit scheduled    Education provided:   Please call back if any changes to your diet, medications or how you've been taking warfarin  Goal range and lab monitoring: goal range and significance of current result, Importance of therapeutic range, Importance of following up at instructed interval, and frequency of lab work when starting warfarin and importance of following up when instructed (extends after stability established)    Plan made per [facility] anticoagulation protocol    Jennifer Barriga RN  Anticoagulation Clinic  7/28/2023    _______________________________________________________________________     Anticoagulation Episode Summary       Current INR goal:  2.0-3.0   TTR:  --   Target end date:  Indefinite   Send INR reminders to:  [facility]    Indications    Persistent atrial fibrillation (H) [I48.19]  Chronic atrial fibrillation (H) [I48.20]             Comments:  6-12-23 Pt will be finishing up his Eliquis supply (approx 1 month worth) then switching to coumadin 5 mg daily for 3 days then INR on the 4th day             Anticoagulation Care Providers       Provider Role Specialty Phone number    Tami Calabrese MD Referring Cardiovascular Disease [phone]    Pablo Benz MD Referring Family Medicine [phone]              PLAN     Dosing Instructions: Continue your current warfarin dose with INR in 5 days       Summary  As of 7/28/2023      Next INR  check:  7/31/2023               Education provided:   Please call back if any changes to your diet, medications or how you've been taking warfarin  Taking warfarin: purpose of warfarin and how it works, take warfarin at same time each day; preferably in the evening, prescribed tablet strength and color, importance of following [facility] instructions vs instructions on the prescription bottle, and Importance of taking warfarin as instructed  Goal range and lab monitoring: goal range and significance of current result, Importance of therapeutic range, Importance of following up at instructed interval, and frequency of lab work when starting warfarin and importance of following up when instructed (extends after stability established)  Dietary considerations: importance of consistent vitamin K intake, impact of vitamin K foods on INR, vitamin K content of foods, Impact of protein intake on INR , and importance of notifying [facility] to changes in diet  Healthy lifestyle considerations: potential interaction between warfarin and alcohol  Symptom monitoring: when to seek medical attention/emergency care  Importance of notifying anticoagulation clinic for: changes in medications; a sooner lab recheck maybe needed, diarrhea, nausea/vomiting, reduced intake, cold/flu, and/or infections; a sooner lab recheck maybe needed, upcoming surgeries and procedures 2 weeks in advance, and if you did not receive dosing instructions on the same day as your labs were checked    Education still needed:   Symptom monitoring: monitoring for bleeding signs and symptoms, monitoring for clotting signs and symptoms, monitoring for stroke signs and symptoms, and if you hit your head or have a bad fall seek emergency care      Telephone call with Cameron who verbalizes understanding and agrees to plan    Lab visit scheduled    Standing orders placed in Epic: Point of Care INR (Lab 5000)    Plan made per [facility] anticoagulation protocol    Jennifer Barriga,  RN  Anticoagulation Clinic  7/28/2023

## 2023-07-30 DIAGNOSIS — I48.19 PERSISTENT ATRIAL FIBRILLATION (H): Primary | ICD-10-CM

## 2023-07-31 ENCOUNTER — ANTICOAGULATION THERAPY VISIT (OUTPATIENT)
Dept: ANTICOAGULATION | Facility: CLINIC | Age: 68
End: 2023-07-31

## 2023-07-31 ENCOUNTER — LAB (OUTPATIENT)
Dept: LAB | Facility: CLINIC | Age: 68
End: 2023-07-31
Payer: COMMERCIAL

## 2023-07-31 DIAGNOSIS — I48.19 PERSISTENT ATRIAL FIBRILLATION (H): Primary | ICD-10-CM

## 2023-07-31 DIAGNOSIS — I48.19 PERSISTENT ATRIAL FIBRILLATION (H): ICD-10-CM

## 2023-07-31 DIAGNOSIS — I48.20 CHRONIC ATRIAL FIBRILLATION (H): ICD-10-CM

## 2023-07-31 LAB — INR BLD: 1.8 (ref 0.9–1.1)

## 2023-07-31 PROCEDURE — 85610 PROTHROMBIN TIME: CPT

## 2023-07-31 PROCEDURE — 36416 COLLJ CAPILLARY BLOOD SPEC: CPT

## 2023-07-31 RX ORDER — WARFARIN SODIUM 5 MG/1
TABLET ORAL
Qty: 90 TABLET | Refills: 1 | Status: SHIPPED | OUTPATIENT
Start: 2023-07-31 | End: 2024-02-05

## 2023-07-31 NOTE — TELEPHONE ENCOUNTER
ANTICOAGULATION MANAGEMENT:  Medication Refill    Anticoagulation Summary  As of 7/31/2023      Warfarin maintenance plan:  5 mg (5 mg x 1) every day   Next INR check:  8/2/2023   Target end date:  Indefinite    Indications    Persistent atrial fibrillation (H) [I48.19]  Chronic atrial fibrillation (H) [I48.20]                 Anticoagulation Care Providers       Provider Role Specialty Phone number    Tami Calabrese MD Referring Cardiovascular Disease [phone]    Pablo Benz MD Referring Family Medicine [phone]            Refill Criteria    Visit with referring provider/group: Meets criteria: office visit within referring provider group in the last 1 year on 6/26/23    [facility] referral signed last signed: 07/05/2023; within last year: Yes    Lab monitoring not exceeding 2 weeks overdue:  Yes    Cameron meets all criteria for refill. Rx instructions and quantity supplied updated to match patient's current dosing plan. Warfarin 90 day supply with 1 refill granted per [facility] protocol     Jennifer Barriga RN  Anticoagulation Clinic

## 2023-07-31 NOTE — PROGRESS NOTES
ANTICOAGULATION MANAGEMENT     Cameron Mariano 68 year old male is on warfarin with therapeutic INR result. (Goal INR 2.0-3.0)    Recent labs: (last 7 days)     07/31/23  1313   INR 1.8*       ASSESSMENT     Source(s): Chart Review and Patient/Caregiver Call     Warfarin doses taken: Warfarin taken as instructed  Diet: No new diet changes identified  Medication/supplement changes: None noted  New illness, injury, or hospitalization: No  Signs or symptoms of bleeding or clotting: No  Previous result: Subtherapeutic  Additional findings: New start on day 7 of warfarin       PLAN     Recommended plan for no diet, medication or health factor changes affecting INR     Dosing Instructions: Continue your current warfarin dose with next INR in 2 days       Summary  As of 7/31/2023      Full warfarin instructions:  5 mg every day   Next INR check:                 Telephone call with Cameron who verbalizes understanding and agrees to plan    Lab visit scheduled    Education provided:   Please call back if any changes to your diet, medications or how you've been taking warfarin  Goal range and lab monitoring: goal range and significance of current result, Importance of therapeutic range, Importance of following up at instructed interval, and frequency of lab work when starting warfarin and importance of following up when instructed (extends after stability established)    Plan made per [facility] anticoagulation protocol    Jennifer Barriga RN  Anticoagulation Clinic  7/31/2023    _______________________________________________________________________     Anticoagulation Episode Summary       Current INR goal:  2.0-3.0   TTR:  0.0 % (3 d)   Target end date:  Indefinite   Send INR reminders to:  [facility]    Indications    Persistent atrial fibrillation (H) [I48.19]  Chronic atrial fibrillation (H) [I48.20]             Comments:  6-12-23 Pt will be finishing up his Eliquis supply (approx 1 month worth) then switching to coumadin 5 mg  daily for 3 days then INR on the 4th day             Anticoagulation Care Providers       Provider Role Specialty Phone number    Tami Calabrese MD Referring Cardiovascular Disease [phone]    Pablo Benz MD Referring Family Medicine [phone]

## 2023-08-02 ENCOUNTER — LAB (OUTPATIENT)
Dept: LAB | Facility: CLINIC | Age: 68
End: 2023-08-02
Payer: COMMERCIAL

## 2023-08-02 ENCOUNTER — ANTICOAGULATION THERAPY VISIT (OUTPATIENT)
Dept: ANTICOAGULATION | Facility: CLINIC | Age: 68
End: 2023-08-02

## 2023-08-02 DIAGNOSIS — I48.19 PERSISTENT ATRIAL FIBRILLATION (H): ICD-10-CM

## 2023-08-02 DIAGNOSIS — I48.20 CHRONIC ATRIAL FIBRILLATION (H): ICD-10-CM

## 2023-08-02 DIAGNOSIS — I48.19 PERSISTENT ATRIAL FIBRILLATION (H): Primary | ICD-10-CM

## 2023-08-02 LAB — INR BLD: 1.7 (ref 0.9–1.1)

## 2023-08-02 PROCEDURE — 85610 PROTHROMBIN TIME: CPT

## 2023-08-02 PROCEDURE — 36416 COLLJ CAPILLARY BLOOD SPEC: CPT

## 2023-08-02 NOTE — PROGRESS NOTES
ANTICOAGULATION MANAGEMENT     Cameron Mariano 68 year old male is on warfarin with subtherapeutic INR result. (Goal INR 2.0-3.0)    Recent labs: (last 7 days)     08/02/23  1036   INR 1.7*       ASSESSMENT     Source(s): Chart Review and Patient/Caregiver Call     Warfarin doses taken: Warfarin taken as instructed  Diet: Increased greens/vitamin K in diet; plans to resume previous intake  Medication/supplement changes: None noted  New illness, injury, or hospitalization: No  Signs or symptoms of bleeding or clotting: No  Previous result: Subtherapeutic  Additional findings: New start on day 9 of warfarin       PLAN     Recommended plan for no diet, medication or health factor changes affecting INR     Dosing Instructions: Increase your warfarin dose (7% change) with next INR in 5 days       Summary  As of 8/2/2023      Full warfarin instructions:  7.5 mg every Wed; 5 mg all other days   Next INR check:  8/7/2023               Telephone call with Cameron who verbalizes understanding and agrees to plan    Lab visit scheduled    Education provided:   Please call back if any changes to your diet, medications or how you've been taking warfarin  Goal range and lab monitoring: goal range and significance of current result, Importance of therapeutic range, and Importance of following up at instructed interval    Plan made per [facility] anticoagulation protocol    Jennifer Barriga RN  Anticoagulation Clinic  8/2/2023    _______________________________________________________________________     Anticoagulation Episode Summary       Current INR goal:  2.0-3.0   TTR:  0.0 % (4 d)   Target end date:  Indefinite   Send INR reminders to:  [facility]    Indications    Persistent atrial fibrillation (H) [I48.19]  Chronic atrial fibrillation (H) [I48.20]             Comments:  6-12-23 Pt will be finishing up his Eliquis supply (approx 1 month worth) then switching to coumadin 5 mg daily for 3 days then INR on the 4th day              Anticoagulation Care Providers       Provider Role Specialty Phone number    Tami Calabrese MD Referring Cardiovascular Disease [phone]    Pablo Benz MD Referring Family Medicine [phone]

## 2023-08-07 ENCOUNTER — ANTICOAGULATION THERAPY VISIT (OUTPATIENT)
Dept: ANTICOAGULATION | Facility: CLINIC | Age: 68
End: 2023-08-07

## 2023-08-07 ENCOUNTER — LAB (OUTPATIENT)
Dept: LAB | Facility: CLINIC | Age: 68
End: 2023-08-07
Payer: COMMERCIAL

## 2023-08-07 DIAGNOSIS — I48.19 PERSISTENT ATRIAL FIBRILLATION (H): Primary | ICD-10-CM

## 2023-08-07 DIAGNOSIS — I48.19 PERSISTENT ATRIAL FIBRILLATION (H): ICD-10-CM

## 2023-08-07 DIAGNOSIS — I48.20 CHRONIC ATRIAL FIBRILLATION (H): ICD-10-CM

## 2023-08-07 LAB — INR BLD: 2.3 (ref 0.9–1.1)

## 2023-08-07 PROCEDURE — 36416 COLLJ CAPILLARY BLOOD SPEC: CPT

## 2023-08-07 PROCEDURE — 85610 PROTHROMBIN TIME: CPT

## 2023-08-07 NOTE — PROGRESS NOTES
ANTICOAGULATION MANAGEMENT     Cameron Mariano 68 year old male is on warfarin with therapeutic INR result. (Goal INR 2.0-3.0)    Recent labs: (last 7 days)     08/07/23  1052   INR 2.3*       ASSESSMENT     Source(s): Chart Review and Patient/Caregiver Call     Warfarin doses taken: Warfarin taken as instructed  Diet: No new diet changes identified  Medication/supplement changes: None noted  New illness, injury, or hospitalization: No  Signs or symptoms of bleeding or clotting: No  Previous result: Subtherapeutic  Additional findings: New start on day 14 of warfarin, will make sure in range results and transition to weekly checks per protocol as able       PLAN     Recommended plan for no diet, medication or health factor changes affecting INR     Dosing Instructions: Continue your current warfarin dose with next INR in 4 days       Summary  As of 8/7/2023      Full warfarin instructions:  7.5 mg every Wed; 5 mg all other days   Next INR check:  8/10/2023               Telephone call with Cameron who verbalizes understanding and agrees to plan    Lab visit scheduled    Education provided:   Please call back if any changes to your diet, medications or how you've been taking warfarin  Goal range and lab monitoring: Importance of therapeutic range, Importance of following up at instructed interval, and frequency of lab work when starting warfarin and importance of following up when instructed (extends after stability established)    Plan made per [facility] anticoagulation protocol    Jennifer Barriga RN  Anticoagulation Clinic  8/7/2023    _______________________________________________________________________     Anticoagulation Episode Summary       Current INR goal:  2.0-3.0   TTR:  25.3 % (1.3 wk)   Target end date:  Indefinite   Send INR reminders to:  [facility]    Indications    Persistent atrial fibrillation (H) [I48.19]  Chronic atrial fibrillation (H) [I48.20]             Comments:  6-12-23 Pt will be finishing up  his Eliquis supply (approx 1 month worth) then switching to coumadin 5 mg daily for 3 days then INR on the 4th day             Anticoagulation Care Providers       Provider Role Specialty Phone number    Tami Calabrese MD Referring Cardiovascular Disease [phone]    Pablo Benz MD Referring Family Medicine [phone]

## 2023-08-10 ENCOUNTER — LAB (OUTPATIENT)
Dept: LAB | Facility: CLINIC | Age: 68
End: 2023-08-10
Payer: COMMERCIAL

## 2023-08-10 ENCOUNTER — ANTICOAGULATION THERAPY VISIT (OUTPATIENT)
Dept: ANTICOAGULATION | Facility: CLINIC | Age: 68
End: 2023-08-10

## 2023-08-10 DIAGNOSIS — I48.19 PERSISTENT ATRIAL FIBRILLATION (H): Primary | ICD-10-CM

## 2023-08-10 DIAGNOSIS — I48.19 PERSISTENT ATRIAL FIBRILLATION (H): ICD-10-CM

## 2023-08-10 DIAGNOSIS — I48.20 CHRONIC ATRIAL FIBRILLATION (H): ICD-10-CM

## 2023-08-10 LAB — INR BLD: 2.2 (ref 0.9–1.1)

## 2023-08-10 PROCEDURE — 36416 COLLJ CAPILLARY BLOOD SPEC: CPT

## 2023-08-10 PROCEDURE — 85610 PROTHROMBIN TIME: CPT

## 2023-08-10 NOTE — PROGRESS NOTES
ANTICOAGULATION MANAGEMENT     Cameron Mariano 68 year old male is on warfarin with therapeutic INR result. (Goal INR 2.0-3.0)    Recent labs: (last 7 days)     08/10/23  1253   INR 2.2*       ASSESSMENT     Source(s): Chart Review and Patient/Caregiver Call     Warfarin doses taken: Warfarin taken as instructed  Diet: No new diet changes identified  Medication/supplement changes: None noted  New illness, injury, or hospitalization: No  Signs or symptoms of bleeding or clotting: No  Previous result: Therapeutic last visit; previously outside of goal range  Additional findings: None       PLAN     Recommended plan for no diet, medication or health factor changes affecting INR     Dosing Instructions: Continue your current warfarin dose with next INR in 1 week       Summary  As of 8/10/2023      Full warfarin instructions:  7.5 mg every Wed; 5 mg all other days   Next INR check:  8/17/2023               Telephone call with Cameron who verbalizes understanding and agrees to plan    Lab visit scheduled    Education provided:   Contact [phone]  with any changes, questions or concerns.     Plan made per [facility] anticoagulation protocol    Camilla Odonnell RN  Anticoagulation Clinic  8/10/2023    _______________________________________________________________________     Anticoagulation Episode Summary       Current INR goal:  2.0-3.0   TTR:  43.0 % (1.7 wk)   Target end date:  Indefinite   Send INR reminders to:  [facility]    Indications    Persistent atrial fibrillation (H) [I48.19]  Chronic atrial fibrillation (H) [I48.20]             Comments:  6-12-23 Pt will be finishing up his Eliquis supply (approx 1 month worth) then switching to coumadin 5 mg daily for 3 days then INR on the 4th day             Anticoagulation Care Providers       Provider Role Specialty Phone number    Tami Calabrese MD Referring Cardiovascular Disease [phone]    Pablo Benz MD Referring Family Medicine [phone]

## 2023-08-17 ENCOUNTER — ANTICOAGULATION THERAPY VISIT (OUTPATIENT)
Dept: ANTICOAGULATION | Facility: CLINIC | Age: 68
End: 2023-08-17

## 2023-08-17 ENCOUNTER — LAB (OUTPATIENT)
Dept: LAB | Facility: CLINIC | Age: 68
End: 2023-08-17
Payer: COMMERCIAL

## 2023-08-17 DIAGNOSIS — I48.20 CHRONIC ATRIAL FIBRILLATION (H): ICD-10-CM

## 2023-08-17 DIAGNOSIS — I48.19 PERSISTENT ATRIAL FIBRILLATION (H): ICD-10-CM

## 2023-08-17 DIAGNOSIS — I48.19 PERSISTENT ATRIAL FIBRILLATION (H): Primary | ICD-10-CM

## 2023-08-17 LAB — INR BLD: 2.5 (ref 0.9–1.1)

## 2023-08-17 PROCEDURE — 36416 COLLJ CAPILLARY BLOOD SPEC: CPT

## 2023-08-17 PROCEDURE — 85610 PROTHROMBIN TIME: CPT

## 2023-08-17 NOTE — PROGRESS NOTES
ANTICOAGULATION MANAGEMENT     Cameron Mariano 68 year old male is on warfarin with therapeutic INR result. (Goal INR 2.0-3.0)    Recent labs: (last 7 days)     08/17/23  1257   INR 2.5*       ASSESSMENT     Source(s): Chart Review and Patient/Caregiver Call     Warfarin doses taken: Warfarin taken as instructed  Diet: No new diet changes identified  Medication/supplement changes: None noted  New illness, injury, or hospitalization: No  Signs or symptoms of bleeding or clotting: No  Previous result: Therapeutic last 2(+) visits  Additional findings: None       PLAN     Recommended plan for no diet, medication or health factor changes affecting INR     Dosing Instructions: Continue your current warfarin dose with next INR in 1 week       Summary  As of 8/17/2023      Full warfarin instructions:  7.5 mg every Wed; 5 mg all other days   Next INR check:  8/24/2023               Telephone call with Cameron who verbalizes understanding and agrees to plan    Lab visit scheduled    Education provided:   Goal range and lab monitoring: goal range and significance of current result    Plan made per [facility]agulation protocol    Claritza Cheung RN  Anticoagulation Clinic  8/17/2023    _______________________________________________________________________     Anticoagulation Episode Summary       Current INR goal:  2.0-3.0   TTR:  63.0 % (2.7 wk)   Target end date:  Indefinite   Send INR reminders to:  [facility]    Indications    Persistent atrial fibrillation (H) [I48.19]  Chronic atrial fibrillation (H) [I48.20]             Comments:               Anticoagulation Care Providers       Provider Role Specialty Phone number    Tami Calabrese MD Referring Cardiovascular Disease [phone]    Pablo Benz MD Referring Family Medicine [phone]

## 2023-08-25 ENCOUNTER — ANTICOAGULATION THERAPY VISIT (OUTPATIENT)
Dept: ANTICOAGULATION | Facility: CLINIC | Age: 68
End: 2023-08-25

## 2023-08-25 ENCOUNTER — LAB (OUTPATIENT)
Dept: LAB | Facility: CLINIC | Age: 68
End: 2023-08-25
Payer: COMMERCIAL

## 2023-08-25 DIAGNOSIS — I48.19 PERSISTENT ATRIAL FIBRILLATION (H): ICD-10-CM

## 2023-08-25 DIAGNOSIS — I48.19 PERSISTENT ATRIAL FIBRILLATION (H): Primary | ICD-10-CM

## 2023-08-25 DIAGNOSIS — I48.20 CHRONIC ATRIAL FIBRILLATION (H): ICD-10-CM

## 2023-08-25 LAB — INR BLD: 3.3 (ref 0.9–1.1)

## 2023-08-25 PROCEDURE — 85610 PROTHROMBIN TIME: CPT

## 2023-08-25 PROCEDURE — 36416 COLLJ CAPILLARY BLOOD SPEC: CPT

## 2023-08-25 NOTE — PROGRESS NOTES
ANTICOAGULATION MANAGEMENT     Cameron Mariano 68 year old male is on warfarin with supratherapeutic INR result. (Goal INR 2.0-3.0)    Recent labs: (last 7 days)     08/25/23  1026   INR 3.3*       ASSESSMENT     Source(s): Chart Review     Warfarin doses taken: Reviewed in chart  Diet: No new diet changes identified  Medication/supplement changes: None noted  New illness, injury, or hospitalization: No  Signs or symptoms of bleeding or clotting: No  Previous result: Therapeutic last 2(+) visits  Additional findings:  newer to warfarin as of  7/25/23       PLAN     Recommended plan for no diet, medication or health factor changes affecting INR     Dosing Instructions: partial hold then continue your current warfarin dose with next INR in 1 week       Summary  As of 8/25/2023      Full warfarin instructions:  8/25: 2.5 mg; Otherwise 7.5 mg every Wed; 5 mg all other days   Next INR check:  9/1/2023               Detailed voice message left for Cameron with dosing instructions and follow up date.     Contact [phone] to schedule and with any changes, questions or concerns.     Education provided:   Please call back if any changes to your diet, medications or how you've been taking warfarin    Plan made per [facility] anticoagulation protocol    Haylee Pressley RN  Anticoagulation Clinic  8/25/2023    _______________________________________________________________________     Anticoagulation Episode Summary       Current INR goal:  2.0-3.0   TTR:  62.8 % (3.9 wk)   Target end date:  Indefinite   Send INR reminders to:  [facility]    Indications    Persistent atrial fibrillation (H) [I48.19]  Chronic atrial fibrillation (H) [I48.20]             Comments:               Anticoagulation Care Providers       Provider Role Specialty Phone number    Tami Calabrese MD Referring Cardiovascular Disease [phone]    Pablo Benz MD Referring Family Medicine [phone]

## 2023-09-01 ENCOUNTER — LAB (OUTPATIENT)
Dept: LAB | Facility: CLINIC | Age: 68
End: 2023-09-01
Payer: COMMERCIAL

## 2023-09-01 ENCOUNTER — ANTICOAGULATION THERAPY VISIT (OUTPATIENT)
Dept: ANTICOAGULATION | Facility: CLINIC | Age: 68
End: 2023-09-01

## 2023-09-01 DIAGNOSIS — I48.19 PERSISTENT ATRIAL FIBRILLATION (H): Primary | ICD-10-CM

## 2023-09-01 DIAGNOSIS — I48.19 PERSISTENT ATRIAL FIBRILLATION (H): ICD-10-CM

## 2023-09-01 DIAGNOSIS — I48.20 CHRONIC ATRIAL FIBRILLATION (H): ICD-10-CM

## 2023-09-01 LAB — INR BLD: 2.4 (ref 0.9–1.1)

## 2023-09-01 PROCEDURE — 85610 PROTHROMBIN TIME: CPT

## 2023-09-01 PROCEDURE — 36416 COLLJ CAPILLARY BLOOD SPEC: CPT

## 2023-09-01 NOTE — PROGRESS NOTES
ANTICOAGULATION MANAGEMENT     Cameron Mariano 68 year old male is on warfarin with therapeutic INR result. (Goal INR 2.0-3.0)    Recent labs: (last 7 days)     09/01/23  1115   INR 2.4*       ASSESSMENT     Source(s): Chart Review and Patient/Caregiver Call     Warfarin doses taken: Warfarin taken as instructed  Diet: No new diet changes identified  Medication/supplement changes: None noted  New illness, injury, or hospitalization: No  Signs or symptoms of bleeding or clotting: No  Previous result: Supratherapeutic  Additional findings:  the week before, when inr was over 3, Néstor believes he doubled on Sunday, and that is why he was high. He was out of town and thinks he took in am and evening       PLAN     Recommended plan for no diet, medication or health factor changes affecting INR     Dosing Instructions: Continue your current warfarin dose with next INR in 2 weeks       Summary  As of 9/1/2023      Full warfarin instructions:  7.5 mg every Wed; 5 mg all other days   Next INR check:  9/15/2023               Telephone call with Cameron who verbalizes understanding and agrees to plan    Lab visit scheduled    Education provided:   Please call back if any changes to your diet, medications or how you've been taking warfarin  Goal range and lab monitoring: goal range and significance of current result, Importance of therapeutic range, and Importance of following up at instructed interval    Plan made per [facility]agulation protocol    Jennifer Barriga RN  Anticoagulation Clinic  9/1/2023    _______________________________________________________________________     Anticoagulation Episode Summary       Current INR goal:  2.0-3.0   TTR:  63.6 % (1.1 mo)   Target end date:  Indefinite   Send INR reminders to:  [facility]    Indications    Persistent atrial fibrillation (H) [I48.19]  Chronic atrial fibrillation (H) [I48.20]             Comments:               Anticoagulation Care Providers       Provider Role  Specialty Phone number    Tami Calabrese MD Referring Cardiovascular Disease [phone]    Pablo Benz MD Referring Family Medicine [phone]

## 2023-09-15 ENCOUNTER — ANTICOAGULATION THERAPY VISIT (OUTPATIENT)
Dept: ANTICOAGULATION | Facility: CLINIC | Age: 68
End: 2023-09-15

## 2023-09-15 ENCOUNTER — LAB (OUTPATIENT)
Dept: LAB | Facility: CLINIC | Age: 68
End: 2023-09-15
Payer: COMMERCIAL

## 2023-09-15 DIAGNOSIS — I48.20 CHRONIC ATRIAL FIBRILLATION (H): ICD-10-CM

## 2023-09-15 DIAGNOSIS — I48.19 PERSISTENT ATRIAL FIBRILLATION (H): Primary | ICD-10-CM

## 2023-09-15 DIAGNOSIS — I48.19 PERSISTENT ATRIAL FIBRILLATION (H): ICD-10-CM

## 2023-09-15 LAB — INR BLD: 3.6 (ref 0.9–1.1)

## 2023-09-15 PROCEDURE — 36415 COLL VENOUS BLD VENIPUNCTURE: CPT

## 2023-09-15 PROCEDURE — 85610 PROTHROMBIN TIME: CPT

## 2023-09-15 NOTE — PROGRESS NOTES
ANTICOAGULATION MANAGEMENT     Cameron Mariano 68 year old male is on warfarin with supratherapeutic INR result. (Goal INR 2.0-3.0)    Recent labs: (last 7 days)     09/15/23  1019   INR 3.6*       ASSESSMENT     Source(s): Chart Review and Patient/Caregiver Call     Warfarin doses taken: Warfarin taken as instructed thinks he may have doubled up a dose or two  Diet: Decreased greens/vitamin K in diet; plans to resume previous intake  Medication/supplement changes: None noted  New illness, injury, or hospitalization: No  Signs or symptoms of bleeding or clotting: No  Previous result: Therapeutic last visit; previously outside of goal range  Additional findings: None       PLAN     Recommended plan for temporary change(s) affecting INR     Dosing Instructions: Continue your current warfarin dose with next INR in 2 weeks       Summary  As of 9/15/2023      Full warfarin instructions:  9/15: 5 mg; 9/16: Hold; Otherwise 7.5 mg every Wed; 5 mg all other days   Next INR check:  9/29/2023               Telephone call with Cameron who verbalizes understanding and agrees to plan    Lab visit scheduled    Education provided:   Goal range and lab monitoring: goal range and significance of current result    Plan made per [facility]agulation protocol    Claritza Cheung RN  Anticoagulation Clinic  9/15/2023    _______________________________________________________________________     Anticoagulation Episode Summary       Current INR goal:  2.0-3.0   TTR:  59.7 % (1.6 mo)   Target end date:  Indefinite   Send INR reminders to:  [facility]    Indications    Persistent atrial fibrillation (H) [I48.19]  Chronic atrial fibrillation (H) [I48.20]             Comments:               Anticoagulation Care Providers       Provider Role Specialty Phone number    Tami Calabrese MD Referring Cardiovascular Disease [phone]    Pablo Benz MD Referring Family Medicine [phone]

## 2023-09-29 ENCOUNTER — LAB (OUTPATIENT)
Dept: LAB | Facility: CLINIC | Age: 68
End: 2023-09-29
Payer: COMMERCIAL

## 2023-09-29 ENCOUNTER — ANTICOAGULATION THERAPY VISIT (OUTPATIENT)
Dept: ANTICOAGULATION | Facility: CLINIC | Age: 68
End: 2023-09-29

## 2023-09-29 DIAGNOSIS — I48.20 CHRONIC ATRIAL FIBRILLATION (H): ICD-10-CM

## 2023-09-29 DIAGNOSIS — I48.19 PERSISTENT ATRIAL FIBRILLATION (H): ICD-10-CM

## 2023-09-29 DIAGNOSIS — I48.19 PERSISTENT ATRIAL FIBRILLATION (H): Primary | ICD-10-CM

## 2023-09-29 LAB — INR BLD: 3.5 (ref 0.9–1.1)

## 2023-09-29 PROCEDURE — 36416 COLLJ CAPILLARY BLOOD SPEC: CPT

## 2023-09-29 PROCEDURE — 85610 PROTHROMBIN TIME: CPT

## 2023-09-29 NOTE — PROGRESS NOTES
ANTICOAGULATION MANAGEMENT     Cameron Mariano 68 year old male is on warfarin with supratherapeutic INR result. (Goal INR 2.0-3.0)    Recent labs: (last 7 days)     09/29/23  0909   INR 3.5*       ASSESSMENT     Source(s): Chart Review and Patient/Caregiver Call     Warfarin doses taken: Warfarin taken as instructed  Diet: No new diet changes identified  Medication/supplement changes: None noted  New illness, injury, or hospitalization: No  Signs or symptoms of bleeding or clotting: No  Previous result: Supratherapeutic  Additional findings: None       PLAN     Recommended plan for no diet, medication or health factor changes affecting INR     Dosing Instructions: hold dose then decrease your warfarin dose (6.7% change) with next INR in 2 weeks       Summary  As of 9/29/2023      Full warfarin instructions:  9/29: Hold; Otherwise 5 mg every day   Next INR check:  10/13/2023               Telephone call with Cameron who verbalizes understanding and agrees to plan    Lab visit scheduled    Education provided:   Goal range and lab monitoring: goal range and significance of current result    Plan made per [facility]agulation protocol    Claritza Cheung RN  Anticoagulation Clinic  9/29/2023    _______________________________________________________________________     Anticoagulation Episode Summary       Current INR goal:  2.0-3.0   TTR:  46.5 % (2.1 mo)   Target end date:  Indefinite   Send INR reminders to:  [facility]    Indications    Persistent atrial fibrillation (H) [I48.19]  Chronic atrial fibrillation (H) [I48.20]             Comments:               Anticoagulation Care Providers       Provider Role Specialty Phone number    Tami Calabrese MD Referring Cardiovascular Disease [phone]    Pablo Benz MD Referring Family Medicine [phone]

## 2023-10-13 ENCOUNTER — LAB (OUTPATIENT)
Dept: LAB | Facility: CLINIC | Age: 68
End: 2023-10-13
Payer: COMMERCIAL

## 2023-10-13 ENCOUNTER — ANTICOAGULATION THERAPY VISIT (OUTPATIENT)
Dept: ANTICOAGULATION | Facility: CLINIC | Age: 68
End: 2023-10-13

## 2023-10-13 DIAGNOSIS — I48.20 CHRONIC ATRIAL FIBRILLATION (H): ICD-10-CM

## 2023-10-13 DIAGNOSIS — I48.19 PERSISTENT ATRIAL FIBRILLATION (H): Primary | ICD-10-CM

## 2023-10-13 DIAGNOSIS — I48.19 PERSISTENT ATRIAL FIBRILLATION (H): ICD-10-CM

## 2023-10-13 LAB — INR BLD: 2.3 (ref 0.9–1.1)

## 2023-10-13 PROCEDURE — 36416 COLLJ CAPILLARY BLOOD SPEC: CPT

## 2023-10-13 PROCEDURE — 85610 PROTHROMBIN TIME: CPT

## 2023-10-13 NOTE — PROGRESS NOTES
ANTICOAGULATION MANAGEMENT     Cameron Mariano 68 year old male is on warfarin with therapeutic INR result. (Goal INR 2.0-3.0)    Recent labs: (last 7 days)     10/13/23  1030   INR 2.3*       ASSESSMENT     Source(s): Chart Review  Previous INR was Supratherapeutic  Medication, diet, health changes since last INR chart reviewed; none identified         PLAN     Recommended plan for no diet, medication or health factor changes affecting INR     Dosing Instructions: Continue your current warfarin dose with next INR in 3 weeks       Summary  As of 10/13/2023      Full warfarin instructions:  5 mg every day   Next INR check:  11/3/2023               Detailed voice message left for Cameron with dosing instructions and follow up date.     Contact [phone]  to schedule and with any changes, questions or concerns.     Education provided:   Please call back if any changes to your diet, medications or how you've been taking warfarin  Goal range and lab monitoring: goal range and significance of current result    Plan made per [facility] anticoagulation protocol    Claritza Cheung RN  Anticoagulation Clinic  10/13/2023    _______________________________________________________________________     Anticoagulation Episode Summary       Current INR goal:  2.0-3.0   TTR:  48.6% (2.5 mo)   Target end date:  Indefinite   Send INR reminders to:  [facility]    Indications    Persistent atrial fibrillation (H) [I48.19]  Chronic atrial fibrillation (H) [I48.20]             Comments:               Anticoagulation Care Providers       Provider Role Specialty Phone number    Tami Calabrese MD Referring Cardiovascular Disease [phone]    Pablo Benz MD Referring Family Medicine [phone]             Erythromycin Pregnancy And Lactation Text: This medication is Pregnancy Category B and is considered safe during pregnancy. It is also excreted in breast milk.

## 2023-10-13 NOTE — PROGRESS NOTES
ANTICOAGULATION MANAGEMENT     Cameron Mariano 68 year old male is on warfarin with therapeutic INR result. (Goal INR 2.0-3.0)    Recent labs: (last 7 days)     10/13/23  1030   INR 2.3*       ASSESSMENT     Source(s): Chart Review  Previous INR was Supratherapeutic  Medication, diet, health changes since last INR chart reviewed; none identified         PLAN     Unable to reach Cameron today.    LMTCB    Follow up required to confirm warfarin dose taken and assess for changes    Claritza Cheung RN  Anticoagulation Clinic  10/13/2023

## 2023-10-27 ENCOUNTER — LAB (OUTPATIENT)
Dept: LAB | Facility: CLINIC | Age: 68
End: 2023-10-27
Payer: COMMERCIAL

## 2023-10-27 ENCOUNTER — TELEPHONE (OUTPATIENT)
Dept: FAMILY MEDICINE | Facility: CLINIC | Age: 68
End: 2023-10-27

## 2023-10-27 ENCOUNTER — ANTICOAGULATION THERAPY VISIT (OUTPATIENT)
Dept: ANTICOAGULATION | Facility: CLINIC | Age: 68
End: 2023-10-27

## 2023-10-27 DIAGNOSIS — I48.20 CHRONIC ATRIAL FIBRILLATION (H): ICD-10-CM

## 2023-10-27 DIAGNOSIS — I48.19 PERSISTENT ATRIAL FIBRILLATION (H): Primary | ICD-10-CM

## 2023-10-27 DIAGNOSIS — I48.19 PERSISTENT ATRIAL FIBRILLATION (H): ICD-10-CM

## 2023-10-27 LAB — INR BLD: 1.7 (ref 0.9–1.1)

## 2023-10-27 PROCEDURE — 36416 COLLJ CAPILLARY BLOOD SPEC: CPT

## 2023-10-27 PROCEDURE — 85610 PROTHROMBIN TIME: CPT

## 2023-10-27 NOTE — PROGRESS NOTES
ANTICOAGULATION MANAGEMENT     Cameron Mariano 68 year old male is on warfarin with subtherapeutic INR result. (Goal INR 2.0-3.0)    Recent labs: (last 7 days)     10/27/23  1121   INR 1.7*       ASSESSMENT     Source(s): Chart Review  Previous INR was Therapeutic last visit; previously outside of goal range  Medication, diet, health changes since last INR chart reviewed; none identified         PLAN     Unable to reach Cameron today.    Left message to take a booster dose of warfarin,  7.5 mg tonight. Request call back for assessment.    Follow up required to confirm warfarin dose taken and assess for changes and assess for changes     Sonia Baez RN  Anticoagulation Clinic  10/27/2023

## 2023-10-27 NOTE — TELEPHONE ENCOUNTER
Reason for Call:  Other returning call    Detailed comments: patient called [facility] anti coag nurse back.    No answer at number    Please contact patient.  Thank you.    Phone Number Patient can be reached at: Home number on file [phone] (home)    Best Time: any    Can we leave a detailed message on this number? YES    Call taken on 10/27/2023 at 3:22 PM by Ольга James

## 2023-10-27 NOTE — PROGRESS NOTES
ANTICOAGULATION MANAGEMENT     Cameron Mariano 68 year old male is on warfarin with subtherapeutic INR result. (Goal INR 2.0-3.0)    Recent labs: (last 7 days)     10/27/23  1121   INR 1.7*       ASSESSMENT     Source(s): Chart Review and Patient/Caregiver Call     Warfarin doses taken: Missed dose(s) may be affecting INR  Diet: No new diet changes identified  Medication/supplement changes: None noted  New illness, injury, or hospitalization: No  Signs or symptoms of bleeding or clotting: No  Previous result: Therapeutic last visit; previously outside of goal range  Additional findings:  patient going hunting 11-3 through  11-12       PLAN     Recommended plan for temporary change(s) affecting INR     Dosing Instructions: booster dose then continue your current warfarin dose with next INR in 6 days       Summary  As of 10/27/2023      Full warfarin instructions:  10/27: 7.5 mg; Otherwise 5 mg every day   Next INR check:  11/2/2023               Telephone call with Cameron who verbalizes understanding and agrees to plan    Lab visit scheduled    Education provided:   Please call back if any changes to your diet, medications or how you've been taking warfarin  Taking warfarin: Importance of taking warfarin as instructed  Contact [phone]  with any changes, questions or concerns.     Plan made per [facility] anticoagulation protocol    Sonia Baez RN  Anticoagulation Clinic  10/27/2023    _______________________________________________________________________     Anticoagulation Episode Summary       Current INR goal:  2.0-3.0   TTR:  48.8% (3 mo)   Target end date:  Indefinite   Send INR reminders to:  [facility]    Indications    Persistent atrial fibrillation (H) [I48.19]  Chronic atrial fibrillation (H) [I48.20]             Comments:               Anticoagulation Care Providers       Provider Role Specialty Phone number    Tami Calabrese MD Referring Cardiovascular Disease [phone]    Pablo Benz MD  [facility] Family Medicine [phone]

## 2023-11-02 ENCOUNTER — LAB (OUTPATIENT)
Dept: LAB | Facility: CLINIC | Age: 68
End: 2023-11-02
Payer: COMMERCIAL

## 2023-11-02 ENCOUNTER — ANTICOAGULATION THERAPY VISIT (OUTPATIENT)
Dept: ANTICOAGULATION | Facility: CLINIC | Age: 68
End: 2023-11-02

## 2023-11-02 DIAGNOSIS — I48.19 PERSISTENT ATRIAL FIBRILLATION (H): Primary | ICD-10-CM

## 2023-11-02 DIAGNOSIS — I48.20 CHRONIC ATRIAL FIBRILLATION (H): ICD-10-CM

## 2023-11-02 DIAGNOSIS — I48.19 PERSISTENT ATRIAL FIBRILLATION (H): ICD-10-CM

## 2023-11-02 LAB — INR BLD: 2.3 (ref 0.9–1.1)

## 2023-11-02 PROCEDURE — 85610 PROTHROMBIN TIME: CPT

## 2023-11-02 PROCEDURE — 36416 COLLJ CAPILLARY BLOOD SPEC: CPT

## 2023-11-02 NOTE — PROGRESS NOTES
ANTICOAGULATION MANAGEMENT     Cameron Mariano 68 year old male is on warfarin with therapeutic INR result. (Goal INR 2.0-3.0)    Recent labs: (last 7 days)     11/02/23  1317   INR 2.3*       ASSESSMENT     Source(s): Chart Review   Previous result: Subtherapeutic  Additional findings: None     PLAN     Recommended plan for no diet, medication or health factor changes affecting INR     Dosing Instructions: Continue your current warfarin dose with next INR in 2 weeks       Summary  As of 11/2/2023      Full warfarin instructions:  5 mg every day   Next INR check:  11/16/2023               Detailed voice message left for Cameron with dosing instructions and follow up date.     Contact [phone]  to schedule and with any changes, questions or concerns.     Education provided:   Please call back if any changes to your diet, medications or how you've been taking warfarin    Plan made per [facility] anticoagulation protocol    Selene Flores RN  Anticoagulation Clinic  11/2/2023    _______________________________________________________________________     Anticoagulation Episode Summary       Current INR goal:  2.0-3.0   TTR:  48.9% (3.2 mo)   Target end date:  Indefinite   Send INR reminders to:  [facility]    Indications    Persistent atrial fibrillation (H) [I48.19]  Chronic atrial fibrillation (H) [I48.20]             Comments:               Anticoagulation Care Providers       Provider Role Specialty Phone number    Tami Calabrese MD Referring Cardiovascular Disease [phone]    Pablo Benz MD Referring Family Medicine [phone]

## 2023-11-28 ENCOUNTER — ANTICOAGULATION THERAPY VISIT (OUTPATIENT)
Dept: ANTICOAGULATION | Facility: CLINIC | Age: 68
End: 2023-11-28

## 2023-11-28 ENCOUNTER — LAB (OUTPATIENT)
Dept: LAB | Facility: CLINIC | Age: 68
End: 2023-11-28
Payer: COMMERCIAL

## 2023-11-28 DIAGNOSIS — I48.20 CHRONIC ATRIAL FIBRILLATION (H): ICD-10-CM

## 2023-11-28 DIAGNOSIS — I48.19 PERSISTENT ATRIAL FIBRILLATION (H): ICD-10-CM

## 2023-11-28 DIAGNOSIS — I48.19 PERSISTENT ATRIAL FIBRILLATION (H): Primary | ICD-10-CM

## 2023-11-28 LAB — INR BLD: 1.7 (ref 0.9–1.1)

## 2023-11-28 PROCEDURE — 36416 COLLJ CAPILLARY BLOOD SPEC: CPT

## 2023-11-28 PROCEDURE — 85610 PROTHROMBIN TIME: CPT

## 2023-11-28 NOTE — PROGRESS NOTES
ANTICOAGULATION MANAGEMENT     Cameron Mariano 68 year old male is on warfarin with subtherapeutic INR result. (Goal INR 2.0-3.0)    Recent labs: (last 7 days)     11/28/23  1250   INR 1.7*       ASSESSMENT     Source(s): Chart Review and Patient/Caregiver Call     Warfarin doses taken: More warfarin taken than planned which may be affecting INR  Diet: No new diet changes identified  Medication/supplement changes: None noted  New illness, injury, or hospitalization: No  Signs or symptoms of bleeding or clotting: No  Previous result: Therapeutic last visit; previously outside of goal range  Additional findings: None       PLAN     Recommended plan for no diet, medication or health factor changes affecting INR     Dosing Instructions: Continue your current warfarin dose with next INR in 2 weeks       Summary  As of 11/28/2023      Full warfarin instructions:  7.5 mg every Wed; 5 mg all other days   Next INR check:  12/12/2023               Telephone call with Cameron who verbalizes understanding and agrees to plan    Lab visit scheduled    Education provided:   Goal range and lab monitoring: goal range and significance of current result    Plan made per [facility] anticoagulation protocol    Claritza Cheung RN  Anticoagulation Clinic  11/28/2023    _______________________________________________________________________     Anticoagulation Episode Summary       Current INR goal:  2.0-3.0   TTR:  49.1% (4.1 mo)   Target end date:  Indefinite   Send INR reminders to:  [facility]    Indications    Persistent atrial fibrillation (H) [I48.19]  Chronic atrial fibrillation (H) [I48.20]             Comments:               Anticoagulation Care Providers       Provider Role Specialty Phone number    Tami Calabrese MD Referring Cardiovascular Disease [phone]    Pablo Benz MD Referring Family Medicine [phone]

## 2023-12-11 ENCOUNTER — TELEPHONE (OUTPATIENT)
Dept: CARDIOLOGY | Facility: CLINIC | Age: 68
End: 2023-12-11
Payer: COMMERCIAL

## 2023-12-11 NOTE — TELEPHONE ENCOUNTER
Patient scheduled to see Dr. Calabrese on 12/18. Patient was to have echocardiogram prior and no-showed appointment. Writer called and LVM for patient to return call and try to get in for echo prior to appt on Monday otherwise appointment on Monday with Dr. Calabrese will need to be pushed out.    BOST[place] Son

## 2023-12-12 ENCOUNTER — LAB (OUTPATIENT)
Dept: LAB | Facility: CLINIC | Age: 68
End: 2023-12-12
Payer: COMMERCIAL

## 2023-12-12 ENCOUNTER — ANTICOAGULATION THERAPY VISIT (OUTPATIENT)
Dept: ANTICOAGULATION | Facility: CLINIC | Age: 68
End: 2023-12-12

## 2023-12-12 DIAGNOSIS — I48.20 CHRONIC ATRIAL FIBRILLATION (H): ICD-10-CM

## 2023-12-12 DIAGNOSIS — I48.19 PERSISTENT ATRIAL FIBRILLATION (H): Primary | ICD-10-CM

## 2023-12-12 DIAGNOSIS — I48.19 PERSISTENT ATRIAL FIBRILLATION (H): ICD-10-CM

## 2023-12-12 LAB — INR BLD: 2.3 (ref 0.9–1.1)

## 2023-12-12 PROCEDURE — 36416 COLLJ CAPILLARY BLOOD SPEC: CPT

## 2023-12-12 PROCEDURE — 85610 PROTHROMBIN TIME: CPT

## 2023-12-12 NOTE — PROGRESS NOTES
ANTICOAGULATION MANAGEMENT     Cameron Mariano 68 year old male is on warfarin with therapeutic INR result. (Goal INR 2.0-3.0)    Recent labs: (last 7 days)     12/12/23  1106   INR 2.3*       ASSESSMENT     Source(s): Chart Review and Patient/Caregiver Call     Warfarin doses taken: Warfarin taken as instructed  Diet: No new diet changes identified  Medication/supplement changes: None noted  New illness, injury, or hospitalization: No  Signs or symptoms of bleeding or clotting: No  Previous result: Subtherapeutic  Additional findings: None       PLAN     Recommended plan for no diet, medication or health factor changes affecting INR     Dosing Instructions: Continue your current warfarin dose with next INR in 3 weeks       Summary  As of 12/12/2023      Full warfarin instructions:  7.5 mg every Wed; 5 mg all other days   Next INR check:  1/2/2024               Telephone call with Cameron who verbalizes understanding and agrees to plan    Lab visit scheduled    Education provided:   Contact [phone]  with any changes, questions or concerns.     Plan made per [facility] anticoagulation protocol    Mary Alice FARLEY RN  Anticoagulation Clinic  12/12/2023    _______________________________________________________________________     Anticoagulation Episode Summary       Current INR goal:  2.0-3.0   TTR:  49.2% (4.5 mo)   Target end date:  Indefinite   Send INR reminders to:  [facility]    Indications    Persistent atrial fibrillation (H) [I48.19]  Chronic atrial fibrillation (H) [I48.20]             Comments:               Anticoagulation Care Providers       Provider Role Specialty Phone number    Tami Calabrese MD Referring Cardiovascular Disease [phone]    Pablo Benz MD Referring Family Medicine [phone]

## 2023-12-12 NOTE — PROGRESS NOTES
ANTICOAGULATION MANAGEMENT     Cameron Mariano 68 year old male is on warfarin with therapeutic INR result. (Goal INR 2.0-3.0)    Recent labs: (last 7 days)     12/12/23  1106   INR 2.3*       ASSESSMENT     Source(s): Chart Review  Previous INR was Subtherapeutic  Medication, diet, health changes since last INR chart reviewed; none identified         PLAN     Recommended plan for no diet, medication or health factor changes affecting INR     Dosing Instructions: Continue your current warfarin dose with next INR in 3 weeks       Summary  As of 12/12/2023      Full warfarin instructions:  7.5 mg every Wed; 5 mg all other days   Next INR check:  1/2/2024               Detailed voice message left for Cameron with dosing instructions and follow up date.     Contact [phone]  to schedule and with any changes, questions or concerns.     Education provided:   Contact [phone]  with any changes, questions or concerns.     Plan made per [facility] anticoagulation protocol    Mary Alice FARLEY RN  Anticoagulation Clinic  12/12/2023    _______________________________________________________________________     Anticoagulation Episode Summary       Current INR goal:  2.0-3.0   TTR:  49.2% (4.5 mo)   Target end date:  Indefinite   Send INR reminders to:  [facility]    Indications    Persistent atrial fibrillation (H) [I48.19]  Chronic atrial fibrillation (H) [I48.20]             Comments:               Anticoagulation Care Providers       Provider Role Specialty Phone number    Tami Calabrese MD Referring Cardiovascular Disease [phone]    Pablo Benz MD Referring Family Medicine [phone]

## 2023-12-12 NOTE — TELEPHONE ENCOUNTER
Called patient to reschedule Echo prior to seeing Dr. Calabrese. No answer. LVM. Also sent [employer] message.     Radha Grubbs CMA ([facility])

## 2023-12-18 ENCOUNTER — TELEPHONE (OUTPATIENT)
Dept: CARDIOLOGY | Facility: CLINIC | Age: 68
End: 2023-12-18

## 2023-12-18 NOTE — LETTER
Cameron Mariano  [address]  [place] [facility] [place] [address]                        Dear Cameron Mariano,      Our records indicate that you are due for a heart follow up with Cardiology Providers: Dr. Tami Calabrese with an echocardiogram prior.    Your health is very important to us. We have attempted to contact you by phone. Please call our office at [phone] at your earliest convenience to schedule your cardiology follow up appointment(s).    We look forward to seeing you.       Sincerely,    Your Cardiology Team  [facility]

## 2023-12-18 NOTE — TELEPHONE ENCOUNTER
Called patient to schedule an echo and follow-up with Dr. Calabrese.  He was a no show today (12/18/23).    No answer - LVM.     Radha Grubbs CMA ([facility])

## 2023-12-22 NOTE — TELEPHONE ENCOUNTER
Called and LVM for pt to return call to clinic scheduler to schedule echocardiogram and follow-up with Dr. Calabrese.    BOSTON Son

## 2023-12-26 NOTE — TELEPHONE ENCOUNTER
Called patient to reschedule missed Echo and follow up with Dr. Calabrese afterwards. No answer - LVM.     Previous [employer] message not read. Closed encounter due to max attempts reached.     Radha Grubbs CMA ([facility])

## 2023-12-30 NOTE — PROGRESS NOTES
ANTICOAGULATION MANAGEMENT     Patient Name:  Cameron Mariano  Date:  2021    ASSESSMENT /SUBJECTIVE:    Today's INR result of 1.5 is subtherapeutic. Goal INR of 2.0-3.0      Warfarin dose taken: Warfarin taken as instructed    Diet: No new diet changes affecting INR    Medication changes/ interactions: No new medications/supplements affecting INR    Previous INR: Supratherapeutic     S/S of bleeding or thromboembolism: No    New injury or illness: No    Upcoming surgery, procedure or cardioversion: No    Additional findings: transitioning back to warfarin after one month on Pradaxa . Took last dose of pradaxa on 4/3. Started warfarin again on .      PLAN:    Telephone call with Cameron regarding INR result and instructed:     Warfarin Dosing Instructions: 10 mg today then continue your current warfarin dose of 10 mg thursday and 7.5 mg all other days    Instructed patient to follow up no later than: 1 week  Lab visit scheduled    Education provided: Please call back if any changes to your diet, medications or how you've been taking warfarin      Néstor verbalizes understanding and agrees to warfarin dosing plan.    Instructed to call the Anticoagulation Clinic for any changes, questions or concerns. (#[phone])        Franchesca Jarvis RN      OBJECTIVE:  Recent labs: (last 7 days)     21  1557   INR 1.50*         No question data found.  Anticoagulation Summary  As of 2021    INR goal:  2.0-3.0   TTR:  73.4 % (1 y)   INR used for dosin.50 (2021)   Warfarin maintenance plan:  10 mg (5 mg x 2) every Thu; 7.5 mg (5 mg x 1.5) all other days   Full warfarin instructions:  : Hold; 7: Hold; : Hold; : Hold; 4/10: Hold; : Hold; Otherwise 10 mg every Thu; 7.5 mg all other days   Weekly warfarin total:  55 mg   Plan last modified:  Franchesca Jarvis RN (2018)   Next INR check:     Priority:  Maintenance   Target end date:  Indefinite    Indications    Chronic atrial fibrillation  Bedside swallow study completed.   12/30/2023     (H) [I48.20]  Long-term (current) use of anticoagulants [Z79.01] [Z79.01]             Anticoagulation Episode Summary     INR check location:      Preferred lab:      Send INR reminders to:  CHAPO IZQUIERDO    Comments:        Anticoagulation Care Providers     Provider Role Specialty Phone number    Pablo Benz MD Referring Family Medicine [phone]

## 2024-01-02 ENCOUNTER — ANCILLARY PROCEDURE (OUTPATIENT)
Dept: CARDIOLOGY | Facility: CLINIC | Age: 69
End: 2024-01-02
Attending: INTERNAL MEDICINE
Payer: COMMERCIAL

## 2024-01-02 ENCOUNTER — LAB (OUTPATIENT)
Dept: LAB | Facility: CLINIC | Age: 69
End: 2024-01-02
Payer: COMMERCIAL

## 2024-01-02 ENCOUNTER — ANTICOAGULATION THERAPY VISIT (OUTPATIENT)
Dept: ANTICOAGULATION | Facility: CLINIC | Age: 69
End: 2024-01-02

## 2024-01-02 DIAGNOSIS — I48.20 CHRONIC ATRIAL FIBRILLATION (H): ICD-10-CM

## 2024-01-02 DIAGNOSIS — I48.19 PERSISTENT ATRIAL FIBRILLATION (H): ICD-10-CM

## 2024-01-02 DIAGNOSIS — I48.19 PERSISTENT ATRIAL FIBRILLATION (H): Primary | ICD-10-CM

## 2024-01-02 LAB
BI-PLANE LVEF ECHO: NORMAL
INR BLD: 1.8 (ref 0.9–1.1)
LVEF ECHO: NORMAL

## 2024-01-02 PROCEDURE — 2894A ECHOCARDIOGRAM COMPLETE: CPT | Mod: JW | Performed by: INTERNAL MEDICINE

## 2024-01-02 PROCEDURE — 93306 TTE W/DOPPLER COMPLETE: CPT | Performed by: INTERNAL MEDICINE

## 2024-01-02 PROCEDURE — 85610 PROTHROMBIN TIME: CPT

## 2024-01-02 PROCEDURE — 36416 COLLJ CAPILLARY BLOOD SPEC: CPT

## 2024-01-02 RX ADMIN — Medication 5 ML: at 09:02

## 2024-01-02 NOTE — PROGRESS NOTES
ANTICOAGULATION MANAGEMENT     Cameron Mariano 68 year old male is on warfarin with subtherapeutic INR result. (Goal INR 2.0-3.0)    Recent labs: (last 7 days)     01/02/24  1354   INR 1.8*       ASSESSMENT     Source(s): Chart Review and Patient/Caregiver Call     Warfarin doses taken: Warfarin taken as instructed  Diet:  patient is inconsistent with greens. Advised to eat them 3-4 days each week. He did have broccoli salad 3 times in the last week.  Medication/supplement changes: None noted  New illness, injury, or hospitalization: No  Signs or symptoms of bleeding or clotting: No  Previous result: Therapeutic last visit; previously outside of goal range  Additional findings: None       PLAN     Recommended plan for ongoing change(s) affecting INR     Dosing Instructions: Increase your warfarin dose (6.7% change) with next INR in 2 weeks       Summary  As of 1/2/2024      Full warfarin instructions:  7.5 mg every Tue, Fri; 5 mg all other days   Next INR check:  1/16/2024               Telephone call with Cameron who verbalizes understanding and agrees to plan    Lab visit scheduled    Education provided:   Please call back if any changes to your diet, medications or how you've been taking warfarin  Dietary considerations: importance of consistent vitamin K intake, impact of vitamin K foods on INR, and vitamin K content of foods  Contact [phone]  with any changes, questions or concerns.     Plan made per [facility] anticoagulation protocol    Sonia Baez RN  Anticoagulation Clinic  1/2/2024    _______________________________________________________________________     Anticoagulation Episode Summary       Current INR goal:  2.0-3.0   TTR:  50.7% (5.3 mo)   Target end date:  Indefinite   Send INR reminders to:  [facility] DAY    Indications    Persistent atrial fibrillation (H) [I48.19]  Chronic atrial fibrillation (H) [I48.20]             Comments:               Anticoagulation Care Providers       Provider Role  Specialty Phone number    Tami Calabrese MD Referring Cardiovascular Disease [phone]    Pablo Benz MD Referring Family Medicine [phone]

## 2024-01-16 ENCOUNTER — LAB (OUTPATIENT)
Dept: LAB | Facility: CLINIC | Age: 69
End: 2024-01-16
Payer: COMMERCIAL

## 2024-01-16 ENCOUNTER — ANTICOAGULATION THERAPY VISIT (OUTPATIENT)
Dept: ANTICOAGULATION | Facility: CLINIC | Age: 69
End: 2024-01-16

## 2024-01-16 DIAGNOSIS — I48.20 CHRONIC ATRIAL FIBRILLATION (H): ICD-10-CM

## 2024-01-16 DIAGNOSIS — I48.19 PERSISTENT ATRIAL FIBRILLATION (H): Primary | ICD-10-CM

## 2024-01-16 DIAGNOSIS — I48.19 PERSISTENT ATRIAL FIBRILLATION (H): ICD-10-CM

## 2024-01-16 LAB — INR BLD: 2.3 (ref 0.9–1.1)

## 2024-01-16 PROCEDURE — 85610 PROTHROMBIN TIME: CPT

## 2024-01-16 PROCEDURE — 36416 COLLJ CAPILLARY BLOOD SPEC: CPT

## 2024-01-16 NOTE — PROGRESS NOTES
ANTICOAGULATION MANAGEMENT     Cameron Mariano 68 year old male is on warfarin with therapeutic INR result. (Goal INR 2.0-3.0)    Recent labs: (last 7 days)     01/16/24  1300   INR 2.3*       ASSESSMENT     Source(s): Chart Review and Patient/Caregiver Call     Warfarin doses taken: Warfarin taken as instructed  Diet: No new diet changes identified  Medication/supplement changes: None noted  New illness, injury, or hospitalization: No  Signs or symptoms of bleeding or clotting: No  Previous result: Subtherapeutic resulting in a dose increase  Additional findings: None       PLAN     Recommended plan for no diet, medication or health factor changes affecting INR     Dosing Instructions: Continue your current warfarin dose with next INR in 2 weeks       Summary  As of 1/16/2024      Full warfarin instructions:  7.5 mg every Tue, Fri; 5 mg all other days   Next INR check:  1/29/2024               Telephone call with Cameron who verbalizes understanding and agrees to plan    Check at provider office visit    Education provided:   Please call back if any changes to your diet, medications or how you've been taking warfarin  Contact [phone]  with any changes, questions or concerns.     Plan made per [facility] anticoagulation protocol    Sonia Baez RN  Anticoagulation Clinic  1/16/2024    _______________________________________________________________________     Anticoagulation Episode Summary       Current INR goal:  2.0-3.0   TTR:  51.4% (5.7 mo)   Target end date:  Indefinite   Send INR reminders to:  CH[facility]O DAY    Indications    Persistent atrial fibrillation (H) [I48.19]  Chronic atrial fibrillation (H) [I48.20]             Comments:               Anticoagulation Care Providers       Provider Role Specialty Phone number    Tami Calabrese MD Referring Cardiovascular Disease [phone]    Pablo Benz MD Referring Family Medicine [phone]

## 2024-01-29 ENCOUNTER — ANTICOAGULATION THERAPY VISIT (OUTPATIENT)
Dept: ANTICOAGULATION | Facility: CLINIC | Age: 69
End: 2024-01-29

## 2024-01-29 ENCOUNTER — OFFICE VISIT (OUTPATIENT)
Dept: CARDIOLOGY | Facility: CLINIC | Age: 69
End: 2024-01-29
Payer: COMMERCIAL

## 2024-01-29 ENCOUNTER — TELEPHONE (OUTPATIENT)
Dept: ANTICOAGULATION | Facility: CLINIC | Age: 69
End: 2024-01-29

## 2024-01-29 VITALS
BODY MASS INDEX: 27.86 KG/M2 | OXYGEN SATURATION: 93 % | WEIGHT: 194.2 LBS | DIASTOLIC BLOOD PRESSURE: 75 MMHG | HEART RATE: 109 BPM | SYSTOLIC BLOOD PRESSURE: 129 MMHG

## 2024-01-29 DIAGNOSIS — I48.21 PERMANENT ATRIAL FIBRILLATION (H): Primary | ICD-10-CM

## 2024-01-29 DIAGNOSIS — I42.8 NONISCHEMIC CARDIOMYOPATHY (H): ICD-10-CM

## 2024-01-29 DIAGNOSIS — I48.19 PERSISTENT ATRIAL FIBRILLATION (H): Primary | ICD-10-CM

## 2024-01-29 DIAGNOSIS — I48.20 CHRONIC ATRIAL FIBRILLATION (H): ICD-10-CM

## 2024-01-29 DIAGNOSIS — I48.19 PERSISTENT ATRIAL FIBRILLATION (H): ICD-10-CM

## 2024-01-29 LAB — INR BLD: 2 (ref 0.9–1.1)

## 2024-01-29 PROCEDURE — 85610 PROTHROMBIN TIME: CPT | Performed by: INTERNAL MEDICINE

## 2024-01-29 PROCEDURE — 99214 OFFICE O/P EST MOD 30 MIN: CPT | Performed by: INTERNAL MEDICINE

## 2024-01-29 PROCEDURE — 36416 COLLJ CAPILLARY BLOOD SPEC: CPT | Performed by: INTERNAL MEDICINE

## 2024-01-29 NOTE — PROGRESS NOTES
ANTICOAGULATION MANAGEMENT     Cameron Mariano 68 year old male is on warfarin with therapeutic INR result. (Goal INR 2.0-3.0)    Recent labs: (last 7 days)     01/29/24  1656   INR 2.0*       ASSESSMENT     Source(s): Chart Review  Previous INR was Therapeutic last visit; previously outside of goal range  Medication, diet, health changes since last INR patient has colonoscopy on 2/29/24, procedure plan in process, see note from 1/29/24.          PLAN     Recommended plan for no diet, medication or health factor changes affecting INR     Dosing Instructions: Continue your current warfarin dose with next INR in 3 weeks. Left message that [facility] was aware of his upcoming procedure and would contact patient once procedure plan is finalized.        Summary  As of 1/29/2024      Full warfarin instructions:  7.5 mg every Tue, Fri; 5 mg all other days   Next INR check:  2/19/2024               Detailed voice message left for Cameron with dosing instructions and follow up date.     Contact [phone]  to schedule and with any changes, questions or concerns.     Education provided:   Contact [phone]  with any changes, questions or concerns.     Plan made per [facility] anticoagulation protocol    Sonia Das RN  Anticoagulation Clinic  1/29/2024    _______________________________________________________________________     Anticoagulation Episode Summary       Current INR goal:  2.0-3.0   TTR:  54.9% (6.2 mo)   Target end date:  Indefinite   Send INR reminders to:  CHAPO DAY    Indications    Persistent atrial fibrillation (H) [I48.19]  Chronic atrial fibrillation (H) [I48.20]             Comments:               Anticoagulation Care Providers       Provider Role Specialty Phone number    Tami Calabrese MD Referring Cardiovascular Disease [phone]    Pablo Benz MD Referring Family Medicine [phone]

## 2024-01-29 NOTE — LETTER
1/29/2024      RE: Cameron Mariano  [address]  [place] [address]       Dear Colleague,    Thank you for the opportunity to participate in the care of your patient, Cameron Mariano, at the [facility]. Please see a copy of my visit note below.    HPI: Purpose of visit: Follow-up for atrial fibrillation    Patient is a 67-year-old gentleman with a history of permanent atrial fibrillation, hx of pulmonary vein isolation in March 2021 and nonischemic cardiomyopathy.    Patient's last visit with me was in June 2023.  In the last 6 months, patient has been doing well.  He is currently retired and remains active around the house.  He did not report any symptoms of palpitations, irregular heartbeat sensation, exertional dyspnea, exertional angina, frequent lightheadedness, presyncope or syncope.    A transthoracic echocardiogram in January 2024 showed stable ejection fraction at 45 to 50%.    Patient is currently recovering from a cold.        PAST MEDICAL HISTORY:  Past Medical History:   Diagnosis Date    Adhesive capsulitis of left shoulder 4/13/2019    Atrial fibrillation (H)     History of alcohol abuse     Hypertension     MEDICAL HISTORY OF -     cardioversion X 2    Other primary cardiomyopathies     Cardiomyopathy    Peyronie's disease        CURRENT MEDICATIONS:  Current Outpatient Medications   Medication Sig Dispense Refill    aspirin 81 MG tablet Take 1 tablet by mouth daily.      atorvastatin (LIPITOR) 40 MG tablet Take 1 tablet (40 mg) by mouth daily for cholesterol Pharmacy ok to hold prescription until due 90 tablet 3    diltiazem ER COATED BEADS (CARDIZEM CD/CARTIA XT) 240 MG 24 hr capsule Take 1 capsule (240 mg) by mouth daily 90 capsule 3    fish oil-omega-3 fatty acids 1000 MG capsule Take 2 g by mouth daily.      lisinopril (ZESTRIL) 5 MG tablet Take 1 tablet (5 mg) by mouth daily 90 tablet 3    metoprolol  succinate ER (TOPROL XL) 100 MG 24 hr tablet Take 1 tablet (100 mg) by mouth daily 90 tablet 3    MULTI-VITAMIN OR TABS 1 TABLET DAILY      sildenafil (REVATIO) 20 MG tablet TAKE ONE TO TWO TABLETS BY MOUTH ONCE DAILY AS NEEDED FOR  ED,  NEVER  USE  WITH  NITROGLYCERIN,  TERAZOSIN  OR  DOXAZOSIN 20 tablet 3    warfarin ANTICOAGULANT (COUMADIN) 5 MG tablet TAKE 1 TABLET BY MOUTH ONCE DAILY OR  AS  INSTRUCTED  BY  THE  INR  CLINIC 90 tablet 1    Cholecalciferol (VITAMIN D) 400 UNITS capsule Take 1 capsule by mouth daily (Patient not taking: Reported on 1/29/2024)         PAST SURGICAL HISTORY:  Past Surgical History:   Procedure Laterality Date    APPENDECTOMY      EP ABLATION FOCAL AFIB N/A 3/12/2021    Procedure: EP ABLATION FOCAL AFIB;  Surgeon: Tami Calabrese MD;  Location:  HEART CARDIAC CATH LAB       ALLERGIES:     Allergies   Allergen Reactions    Aspartame Hives     Hands and feet swell, gets rash    Other Food Allergy      Mayonnaise per pt       FAMILY HISTORY:  - Premature coronary artery disease  - Atrial fibrillation  - Sudden cardiac death     SOCIAL HISTORY:  Social History     Tobacco Use    Smoking status: Never    Smokeless tobacco: Never   Vaping Use    Vaping Use: Never used   Substance Use Topics    Alcohol use: Not Currently    Drug use: Not Currently     Types: Marijuana       ROS:   Constitutional: No fever, chills, or sweats. Weight stable.   ENT: No visual disturbance, ear ache, epistaxis, sore throat.   Cardiovascular: As per HPI.   Respiratory: No cough, hemoptysis.    GI: No nausea, vomiting, hematemesis, melena, or hematochezia.   : No hematuria.   Integument: Negative.   Psychiatric: Negative.   Hematologic:  Easy bruising, no easy bleeding.  Neuro: Negative.   Endocrinology: No significant heat or cold intolerance   Musculoskeletal: No myalgia.    Exam:  /75 (BP Location: Left arm, Patient Position: Sitting, Cuff Size: Adult Regular)   Pulse 109   Wt 88.1 kg (194 lb 3.2 oz)    SpO2 93%   BMI 27.86 kg/m    GENERAL APPEARANCE: healthy, alert and no distress  HEENT: no icterus, no xanthelasmas, normal pupil size and reaction, normal palate, mucosa moist, no central cyanosis  NECK: no adenopathy, no asymmetry, masses, or scars, thyroid normal to palpation and no bruits, JVP not elevated  RESPIRATORY: lungs clear to auscultation - no rales, rhonchi or wheezes, no use of accessory muscles, no retractions, respirations are unlabored, normal respiratory rate  CARDIOVASCULAR: irregular rhythm.  ABDOMEN: soft, non tender, without hepatosplenomegaly, no masses palpable, bowel sounds normal, aorta not enlarged by palpation, no abdominal bruits  EXTREMITIES: peripheral pulses normal, no edema, no bruits  NEURO: alert and oriented to person/place/time, normal speech, gait and affect  VASC: Radial, femoral, dorsalis pedis and posterior tibialis pulses are normal in volumes and symmetric bilaterally. No bruits are heard.  SKIN: no ecchymoses, no rashes    Labs:  CBC RESULTS:   Lab Results   Component Value Date    WBC 8.3 10/21/2022    WBC 9.1 03/12/2021    RBC 4.42 10/21/2022    RBC 4.76 03/12/2021    HGB 14.8 10/21/2022    HGB 15.7 03/12/2021    HCT 43.5 10/21/2022    HCT 46.5 03/12/2021    MCV 98 10/21/2022    MCV 98 03/12/2021    MCH 33.5 (H) 10/21/2022    MCH 33.0 03/12/2021    MCHC 34.0 10/21/2022    MCHC 33.8 03/12/2021    RDW 14.0 10/21/2022    RDW 13.7 03/12/2021     10/21/2022     03/12/2021       BMP RESULTS:  Lab Results   Component Value Date     06/26/2023     03/12/2021    POTASSIUM 4.6 06/26/2023    POTASSIUM 4.4 10/21/2022    POTASSIUM 4.4 03/12/2021    CHLORIDE 103 06/26/2023    CHLORIDE 106 10/21/2022    CHLORIDE 109 03/12/2021    CO2 27 06/26/2023    CO2 31 10/21/2022    CO2 26 03/12/2021    ANIONGAP 8 06/26/2023    ANIONGAP 3 10/21/2022    ANIONGAP 5 03/12/2021    GLC 97 06/26/2023    GLC 96 10/21/2022     (H) 03/12/2021    BUN 9.7 06/26/2023     BUN 16 10/21/2022    BUN 17 03/12/2021    CR 0.81 06/26/2023    CR 0.75 03/12/2021    GFRESTIMATED >90 06/26/2023    GFRESTIMATED >90 03/12/2021    GFRESTBLACK >90 03/12/2021    IRIS 9.4 06/26/2023    IRIS 9.2 03/12/2021        INR RESULTS:  Lab Results   Component Value Date    INR 2.0 (H) 01/29/2024    INR 2.3 (H) 01/16/2024    INR 1.8 (H) 01/02/2024    INR 2.3 (H) 12/12/2023    INR 1.10 06/28/2021    INR 2.10 (H) 06/15/2021    INR 2.40 (H) 05/18/2021    INR 2.00 (H) 04/27/2021       Procedures:      Assessment and Plan:     Permanent atrial fibrillation- heart rate varying between 100 and 110 in clinic today    Nonischemic cardiomyopathy-stable ejection fraction of 45 to 50%    Patient's heart rate is slightly elevated in clinic today.  This might be related to the cold that he is recovering from.  We will have patient wear a 3-day Zio patch monitor in 2 to 3 months and see patient by video to discuss the results.    All questions concerns were addressed and patient was happy with the plan.      CC  Patient Care Team:  Pablo Benz MD as PCP - General (Family Practice)  Tami Calabrese MD as Assigned Heart and Vascular Provider  Pablo Benz MD as Assigned PCP      Please do not hesitate to contact me if you have any questions/concerns.     Sincerely,     Tami Calabrese MD

## 2024-01-29 NOTE — PATIENT INSTRUCTIONS
Thank you for coming to the [facility] at [place]; please note the following instructions:    1. Follow-up in 3 months with a 3 day zio prior        If you have any questions regarding your visit, please contact your care team:     CARDIOLOGY  TELEPHONE NUMBER   Tina WALKERDora, Registered Nurse  Tania RODAS, Registered Nurse  Sindy SHERMAN, Registered Medical Assistant  Radha WU, Certified Medical Assistant  Yuli ANDRES, Clinic Assistant [phone] (select option 1)    *After hours: [phone]   For Scheduling Appts:     [phone] (select option 1)    *After hours: [phone]   For the Device Clinic (Pacemakers and ICD's)  Ana WHITNEY, Registered Nurse   During business hours: [phone]    *After business hours:  [phone] (select option 4)      Normal test result notifications will be released via [employer] or mailed within 7 business days.  All other test results, will be communicated via telephone once reviewed by your cardiologist.    If you need a medication refill, please contact your pharmacy.  Please allow 3 business days for your refill to be completed.    As always, thank you for trusting us with your health care needs!

## 2024-01-29 NOTE — NURSING NOTE
"Chief Complaint   Patient presents with    Follow Up     Reason for visit: Return EP cardiology for 6 month return       Initial /75 (BP Location: Left arm, Patient Position: Sitting, Cuff Size: Adult Regular)   Pulse 109   Wt 88.1 kg (194 lb 3.2 oz)   SpO2 93%   BMI 27.86 kg/m   Estimated body mass index is 27.86 kg/m  as calculated from the following:    Height as of 6/26/23: 1.778 m (5' 10\").    Weight as of this encounter: 88.1 kg (194 lb 3.2 oz)..  BP completed using cuff size: regular    BOSTON Son    "

## 2024-01-30 NOTE — PROGRESS NOTES
HPI: Purpose of visit: Follow-up for atrial fibrillation    Patient is a 67-year-old gentleman with a history of permanent atrial fibrillation, hx of pulmonary vein isolation in March 2021 and nonischemic cardiomyopathy.    Patient's last visit with me was in June 2023.  In the last 6 months, patient has been doing well.  He is currently retired and remains active around the house.  He did not report any symptoms of palpitations, irregular heartbeat sensation, exertional dyspnea, exertional angina, frequent lightheadedness, presyncope or syncope.    A transthoracic echocardiogram in January 2024 showed stable ejection fraction at 45 to 50%.    Patient is currently recovering from a cold.        PAST MEDICAL HISTORY:  Past Medical History:   Diagnosis Date     Adhesive capsulitis of left shoulder 4/13/2019     Atrial fibrillation (H)      History of alcohol abuse      Hypertension      MEDICAL HISTORY OF -     cardioversion X 2     Other primary cardiomyopathies     Cardiomyopathy     Peyronie's disease        CURRENT MEDICATIONS:  Current Outpatient Medications   Medication Sig Dispense Refill     aspirin 81 MG tablet Take 1 tablet by mouth daily.       atorvastatin (LIPITOR) 40 MG tablet Take 1 tablet (40 mg) by mouth daily for cholesterol Pharmacy ok to hold prescription until due 90 tablet 3     diltiazem ER COATED BEADS (CARDIZEM CD/CARTIA XT) 240 MG 24 hr capsule Take 1 capsule (240 mg) by mouth daily 90 capsule 3     fish oil-omega-3 fatty acids 1000 MG capsule Take 2 g by mouth daily.       lisinopril (ZESTRIL) 5 MG tablet Take 1 tablet (5 mg) by mouth daily 90 tablet 3     metoprolol succinate ER (TOPROL XL) 100 MG 24 hr tablet Take 1 tablet (100 mg) by mouth daily 90 tablet 3     MULTI-VITAMIN OR TABS 1 TABLET DAILY       sildenafil (REVATIO) 20 MG tablet TAKE ONE TO TWO TABLETS BY MOUTH ONCE DAILY AS NEEDED FOR  ED,  NEVER  USE  WITH  NITROGLYCERIN,  TERAZOSIN  OR  DOXAZOSIN 20 tablet 3     warfarin  ANTICOAGULANT (COUMADIN) 5 MG tablet TAKE 1 TABLET BY MOUTH ONCE DAILY OR  AS  INSTRUCTED  BY  THE  INR  CLINIC 90 tablet 1     Cholecalciferol (VITAMIN D) 400 UNITS capsule Take 1 capsule by mouth daily (Patient not taking: Reported on 1/29/2024)         PAST SURGICAL HISTORY:  Past Surgical History:   Procedure Laterality Date     APPENDECTOMY       EP ABLATION FOCAL AFIB N/A 3/12/2021    Procedure: EP ABLATION FOCAL AFIB;  Surgeon: Tami Calabrese MD;  Location:  [facility]       ALLERGIES:     Allergies   Allergen Reactions     Aspartame Hives     Hands and feet swell, gets rash     Other Food Allergy      Mayonnaise per pt       FAMILY HISTORY:  - Premature coronary artery disease  - Atrial fibrillation  - Sudden cardiac death     SOCIAL HISTORY:  Social History     Tobacco Use     Smoking status: Never     Smokeless tobacco: Never   Vaping Use     Vaping Use: Never used   Substance Use Topics     Alcohol use: Not Currently     Drug use: Not Currently     Types: Marijuana       ROS:   Constitutional: No fever, chills, or sweats. Weight stable.   ENT: No visual disturbance, ear ache, epistaxis, sore throat.   Cardiovascular: As per HPI.   Respiratory: No cough, hemoptysis.    GI: No nausea, vomiting, hematemesis, melena, or hematochezia.   : No hematuria.   Integument: Negative.   Psychiatric: Negative.   Hematologic:  Easy bruising, no easy bleeding.  Neuro: Negative.   Endocrinology: No significant heat or cold intolerance   Musculoskeletal: No myalgia.    Exam:  /75 (BP Location: Left arm, Patient Position: Sitting, Cuff Size: Adult Regular)   Pulse 109   Wt 88.1 kg (194 lb 3.2 oz)   SpO2 93%   BMI 27.86 kg/m    GENERAL APPEARANCE: healthy, alert and no distress  HEENT: no icterus, no xanthelasmas, normal pupil size and reaction, normal palate, mucosa moist, no central cyanosis  NECK: no adenopathy, no asymmetry, masses, or scars, thyroid normal to palpation and no bruits, JVP not  elevated  RESPIRATORY: lungs clear to auscultation - no rales, rhonchi or wheezes, no use of accessory muscles, no retractions, respirations are unlabored, normal respiratory rate  CARDIOVASCULAR: irregular rhythm.  ABDOMEN: soft, non tender, without hepatosplenomegaly, no masses palpable, bowel sounds normal, aorta not enlarged by palpation, no abdominal bruits  EXTREMITIES: peripheral pulses normal, no edema, no bruits  NEURO: alert and oriented to person/place/time, normal speech, gait and affect  VASC: Radial, femoral, dorsalis pedis and posterior tibialis pulses are normal in volumes and symmetric bilaterally. No bruits are heard.  SKIN: no ecchymoses, no rashes    Labs:  CBC RESULTS:   Lab Results   Component Value Date    WBC 8.3 10/21/2022    WBC 9.1 03/12/2021    RBC 4.42 10/21/2022    RBC 4.76 03/12/2021    HGB 14.8 10/21/2022    HGB 15.7 03/12/2021    HCT 43.5 10/21/2022    HCT 46.5 03/12/2021    MCV 98 10/21/2022    MCV 98 03/12/2021    MCH 33.5 (H) 10/21/2022    MCH 33.0 03/12/2021    MCHC 34.0 10/21/2022    MCHC 33.8 03/12/2021    RDW 14.0 10/21/2022    RDW 13.7 03/12/2021     10/21/2022     03/12/2021       BMP RESULTS:  Lab Results   Component Value Date     06/26/2023     03/12/2021    POTASSIUM 4.6 06/26/2023    POTASSIUM 4.4 10/21/2022    POTASSIUM 4.4 03/12/2021    CHLORIDE 103 06/26/2023    CHLORIDE 106 10/21/2022    CHLORIDE 109 03/12/2021    CO2 27 06/26/2023    CO2 31 10/21/2022    CO2 26 03/12/2021    ANIONGAP 8 06/26/2023    ANIONGAP 3 10/21/2022    ANIONGAP 5 03/12/2021    GLC 97 06/26/2023    GLC 96 10/21/2022     (H) 03/12/2021    BUN 9.7 06/26/2023    BUN 16 10/21/2022    BUN 17 03/12/2021    CR 0.81 06/26/2023    CR 0.75 03/12/2021    GFRESTIMATED >90 06/26/2023    GFRESTIMATED >90 03/12/2021    GFRESTBLACK >90 03/12/2021    IRIS 9.4 06/26/2023    IRIS 9.2 03/12/2021        INR RESULTS:  Lab Results   Component Value Date    INR 2.0 (H) 01/29/2024    INR  2.3 (H) 01/16/2024    INR 1.8 (H) 01/02/2024    INR 2.3 (H) 12/12/2023    INR 1.10 06/28/2021    INR 2.10 (H) 06/15/2021    INR 2.40 (H) 05/18/2021    INR 2.00 (H) 04/27/2021       Procedures:      Assessment and Plan:     Permanent atrial fibrillation- heart rate varying between 100 and 110 in clinic today    Nonischemic cardiomyopathy-stable ejection fraction of 45 to 50%    Patient's heart rate is slightly elevated in clinic today.  This might be related to the cold that he is recovering from.  We will have patient wear a 3-day Zio patch monitor in 2 to 3 months and see patient by video to discuss the results.    All questions concerns were addressed and patient was happy with the plan.      CC  Patient Care Team:  Pablo Benz MD as PCP - General (Family Practice)  Tami Calabrese MD as Assigned Heart and Vascular Provider  Pablo Benz MD as Assigned PCP

## 2024-02-02 ENCOUNTER — OFFICE VISIT (OUTPATIENT)
Dept: FAMILY MEDICINE | Facility: CLINIC | Age: 69
End: 2024-02-02
Payer: COMMERCIAL

## 2024-02-02 VITALS
HEIGHT: 70 IN | WEIGHT: 191 LBS | RESPIRATION RATE: 16 BRPM | OXYGEN SATURATION: 95 % | DIASTOLIC BLOOD PRESSURE: 72 MMHG | SYSTOLIC BLOOD PRESSURE: 123 MMHG | BODY MASS INDEX: 27.35 KG/M2 | TEMPERATURE: 96.7 F | HEART RATE: 100 BPM

## 2024-02-02 DIAGNOSIS — J32.9 SINUSITIS, UNSPECIFIED CHRONICITY, UNSPECIFIED LOCATION: Primary | ICD-10-CM

## 2024-02-02 DIAGNOSIS — J06.9 UPPER RESPIRATORY TRACT INFECTION, UNSPECIFIED TYPE: ICD-10-CM

## 2024-02-02 LAB
FLUAV RNA SPEC QL NAA+PROBE: NEGATIVE
FLUBV RNA RESP QL NAA+PROBE: NEGATIVE
RSV RNA SPEC NAA+PROBE: POSITIVE
SARS-COV-2 RNA RESP QL NAA+PROBE: NEGATIVE

## 2024-02-02 PROCEDURE — 99213 OFFICE O/P EST LOW 20 MIN: CPT | Performed by: FAMILY MEDICINE

## 2024-02-02 PROCEDURE — 87637 SARSCOV2&INF A&B&RSV AMP PRB: CPT | Performed by: FAMILY MEDICINE

## 2024-02-02 RX ORDER — BENZONATATE 100 MG/1
100 CAPSULE ORAL 3 TIMES DAILY PRN
Qty: 30 CAPSULE | Refills: 0 | Status: SHIPPED | OUTPATIENT
Start: 2024-02-02

## 2024-02-02 ASSESSMENT — PAIN SCALES - GENERAL: PAINLEVEL: NO PAIN (0)

## 2024-02-02 NOTE — PROGRESS NOTES
"  Assessment & Plan     Sinusitis, unspecified chronicity, unspecified location  He reports facial tenderness, pain with sinus congestion and discharge.    -amoxicillin-clavulanate (AUGMENTIN) 875-125 MG tablet; Take 1 tablet by mouth 2 times daily for 7 days  - benzonatate (TESSALON) 100 MG capsule; Take 1 capsule (100 mg) by mouth 3 times daily as needed for cough    Upper respiratory tract infection, unspecified type  Symptoms started about a week ago.  Wife sick with similar symptoms.  Current symptoms include productive cough, congestion, runny nose, fever at home.  Will check RSV, COVID, and influenza.  Push fluids, and supportive care discussed  - Symptomatic Influenza A/B, RSV, & SARS-CoV2 PCR (COVID-19) Nasopharyngeal            BMI  Estimated body mass index is 27.41 kg/m  as calculated from the following:    Height as of this encounter: 1.778 m (5' 10\").    Weight as of this encounter: 86.6 kg (191 lb).         Regular exercise    Subjective   Cameron is a 68 year old, presenting for the following health issues:  URI (103 temp at home 3 days ago.  )        2/2/2024    10:37 AM   Additional Questions   Roomed by Silvana COLLINS   Accompanied by Self     History of Present Illness       Reason for visit:  Cough  Symptom onset:  1-2 weeks ago  Symptoms include:  Cough, worse at night  Symptom intensity:  Moderate  Symptom progression:  Staying the same  Had these symptoms before:  No  What makes it worse:  Laying down  What makes it better:  Sitting up    He eats 0-1 servings of fruits and vegetables daily.He consumes 0 sweetened beverage(s) daily.He exercises with enough effort to increase his heart rate 9 or less minutes per day.  He exercises with enough effort to increase his heart rate 3 or less days per week.   He is taking medications regularly.              Review of Systems  Constitutional, HEENT, cardiovascular, pulmonary, gi and gu systems are negative, except as otherwise noted.      Objective    BP " "123/72   Pulse 100   Temp (!) 96.7  F (35.9  C) (Tympanic)   Resp 16   Ht 1.778 m (5' 10\")   Wt 86.6 kg (191 lb)   SpO2 95%   BMI 27.41 kg/m    Body mass index is 27.41 kg/m .  Physical Exam  Vitals and nursing note reviewed.   Constitutional:       General: He is not in acute distress.     Appearance: Normal appearance. He is not ill-appearing, toxic-appearing or diaphoretic.   HENT:      Head: Normocephalic and atraumatic.      Nose: Congestion and rhinorrhea present. No nasal deformity, septal deviation, signs of injury, laceration, nasal tenderness or mucosal edema.   Cardiovascular:      Rate and Rhythm: Normal rate and regular rhythm.      Pulses: Normal pulses.      Heart sounds: Normal heart sounds. No murmur heard.     No friction rub. No gallop.   Pulmonary:      Effort: Pulmonary effort is normal. No respiratory distress.      Breath sounds: No stridor. No wheezing, rhonchi or rales.   Chest:      Chest wall: No tenderness.   Musculoskeletal:      Cervical back: Normal range of motion and neck supple. No rigidity or tenderness.   Neurological:      Mental Status: He is alert.                    Signed Electronically by: Michael Carson MD    "

## 2024-02-05 DIAGNOSIS — I48.19 PERSISTENT ATRIAL FIBRILLATION (H): ICD-10-CM

## 2024-02-05 RX ORDER — WARFARIN SODIUM 5 MG/1
TABLET ORAL
Qty: 110 TABLET | Refills: 1 | Status: SHIPPED | OUTPATIENT
Start: 2024-02-05 | End: 2024-08-21

## 2024-02-05 NOTE — TELEPHONE ENCOUNTER
ANTICOAGULATION MANAGEMENT:  Medication Refill    Anticoagulation Summary  As of 1/29/2024      Warfarin maintenance plan:  7.5 mg (5 mg x 1.5) every Tue, Fri; 5 mg (5 mg x 1) all other days   Next INR check:  2/19/2024   Target end date:  Indefinite    Indications    Persistent atrial fibrillation (H) [I48.19]  Chronic atrial fibrillation (H) [I48.20]                 Anticoagulation Care Providers       Provider Role Specialty Phone number    Tami Calabrese MD Referring Cardiovascular Disease [phone]    Pablo Benz MD Referring Family Medicine [phone]            Refill Criteria    Visit with referring provider/group: Meets criteria: office visit within referring provider group in the last 1 year on 2/2/24    [facility] referral signed last signed: 07/05/2023; within last year: Yes    Lab monitoring not exceeding 2 weeks overdue: No    Cameron meets all criteria for refill. Rx instructions and quantity supplied updated to match patient's current dosing plan. Warfarin 90 day supply with 1 refill granted per [facility] protocol     Selene Flores RN  Anticoagulation Clinic

## 2024-02-20 ENCOUNTER — LAB (OUTPATIENT)
Dept: LAB | Facility: CLINIC | Age: 69
End: 2024-02-20
Payer: COMMERCIAL

## 2024-02-20 ENCOUNTER — ANTICOAGULATION THERAPY VISIT (OUTPATIENT)
Dept: ANTICOAGULATION | Facility: CLINIC | Age: 69
End: 2024-02-20

## 2024-02-20 DIAGNOSIS — I48.20 CHRONIC ATRIAL FIBRILLATION (H): ICD-10-CM

## 2024-02-20 DIAGNOSIS — I48.19 PERSISTENT ATRIAL FIBRILLATION (H): Primary | ICD-10-CM

## 2024-02-20 DIAGNOSIS — I48.19 PERSISTENT ATRIAL FIBRILLATION (H): ICD-10-CM

## 2024-02-20 LAB — INR BLD: 2.7 (ref 0.9–1.1)

## 2024-02-20 PROCEDURE — 85610 PROTHROMBIN TIME: CPT

## 2024-02-20 PROCEDURE — 36416 COLLJ CAPILLARY BLOOD SPEC: CPT

## 2024-02-20 NOTE — PROGRESS NOTES
ANTICOAGULATION MANAGEMENT     Cameron Mariano 68 year old male is on warfarin with therapeutic INR result. (Goal INR 2.0-3.0)    Recent labs: (last 7 days)     02/20/24  1538   INR 2.7*       ASSESSMENT     Source(s): Chart Review and Patient/Caregiver Call     Warfarin doses taken: Warfarin taken as instructed  Diet: No new diet changes identified  Medication/supplement changes: None noted  New illness, injury, or hospitalization: No  Signs or symptoms of bleeding or clotting: No  Previous result: Therapeutic last 2(+) visits  Additional findings: Upcoming surgery/procedure Colonoscopy 2/29/24, patient will hold warfarin for 4 days prior. See TE from 1/29/24.        PLAN     Recommended plan for temporary change(s) affecting INR     Dosing Instructions: Continue your current warfarin dose until 2/24/26. Hold warfarin for 4 days (2/25/24-2/28/24) prior to procedure. Resume warfarin 10 mg on 2/29/24, and then resume maintenance dose of warfarin with next INR in 2 weeks       Summary  As of 2/20/2024      Full warfarin instructions:  2/25: Hold; 2/26: Hold; 2/27: Hold; 2/28: Hold; 2/29: 10 mg; Otherwise 7.5 mg every Tue, Fri; 5 mg all other days   Next INR check:  3/7/2024               Telephone call with Cameron who verbalizes understanding and agrees to plan and who agrees to plan and repeated back plan correctly. [employer] also sent with instructions.     Lab visit scheduled    Education provided:   Contact [phone]  with any changes, questions or concerns.     Plan made per [facility] anticoagulation protocol    Camilla Odonnell, RN  Anticoagulation Clinic  2/20/2024    _______________________________________________________________________     Anticoagulation Episode Summary       Current INR goal:  2.0-3.0   TTR:  59.7% (6.9 mo)   Target end date:  Indefinite   Send INR reminders to:  [facility]    Indications    Persistent atrial fibrillation (H) [I48.19]  Chronic atrial fibrillation (H) [I48.20]              Comments:               Anticoagulation Care Providers       Provider Role Specialty Phone number    Tami Calabrese MD Referring Cardiovascular Disease [phone]    Pablo Benz MD Referring Family Medicine [phone]

## 2024-02-20 NOTE — TELEPHONE ENCOUNTER
"BARBRA-PROCEDURAL ANTICOAGULATION  MANAGEMENT    ASSESSMENT     Warfarin interruption plan for colonoscopy on 2024.    Indication for Anticoagulation: Atrial Fibrillation    [id number] = 3 (CHF, Hypertension, and Age 65-74)    Barbra-Procedure Risk stratification for thromboembolism: low (2017 [facility] periprocedure pathway for NVAF Expert Consensus)    NVAF: 2017 [facility] periprocedure pathway for NVAF advises NO bridge for low risk stratification ([id number] score <=4 and no prior hx of stroke, TIA or systemic embolism)    RECOMMENDATION     Pre-Procedure:  Hold warfarin for 4 days, until after procedure startin2024   No Bridge    Post-Procedure:  Resume warfarin dose if okay with provider doing procedure on night of procedure, 2024 PM: 10mg  Recheck INR ~ 7 days after resuming warfarin     Plan routed to referring provider for approval  ?   Jessica Robert [facility]    SUBJECTIVE/OBJECTIVE     Cameron Mariano, a 68 year old male    Goal INR Range: 2.0-3.0     Patient bridged in past: Yes: last  prior to guideline changes      Wt Readings from Last 3 Encounters:   24 86.6 kg (191 lb)   24 88.1 kg (194 lb 3.2 oz)   23 86.7 kg (191 lb 3.2 oz)      Ideal body weight: 73 kg (160 lb 15 oz)  Adjusted ideal body weight: 78.5 kg (172 lb 15.4 oz)     Estimated body mass index is 27.41 kg/m  as calculated from the following:    Height as of 24: 1.778 m (5' 10\").    Weight as of 24: 86.6 kg (191 lb).    Lab Results   Component Value Date    INR 2.0 (H) 2024    INR 2.3 (H) 2024    INR 1.8 (H) 2024     Lab Results   Component Value Date    HGB 14.8 10/21/2022    HCT 43.5 10/21/2022     10/21/2022     Lab Results   Component Value Date    CR 0.81 2023    CR 0.76 10/21/2022    CR 0.75 2021     Estimated Creatinine Clearance: 108.4 mL/min (based on SCr of 0.81 mg/dL).    "
[facility] left message requesting hold order for colonoscopy 2/29/24.  Please call back to [facility] hold order secure line at [phone] with orders.  Thank you!    Gillian Dacosta RN  [facility] Anticoagulation Clinic.     
Incoming fax from [facility] requesting 4-day hold for upcoming colonoscopy on 2/29/24. Routing encounter to [facility] to warfarin interruption review.     Chaitanya Kelly RN    
PCP attested agreement to plan, [facility] called, with instructions left on RN line.    Jessica Robert, PharmD BCACP  Anticoagulation Clinical Pharmacist    
Writer spoke with patient, reviewed hold instructions and scheduled follow up INR. See [facility] encounter 02/20/24.    Camilla Odonnell RN, BSN  [facility] Anticoagulation [facility]  [phone]    
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 Long-term followup after 3 years shows that there is still a patent nidus with a draining vein going laterally and turns medially toward the basal vein of Carmen. It seems that over the years, the pattern of this residual nidus had changed along with the pattern of its venous drainage. 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 Left pterional incision is well approximated without any evidence of infection. She denies fever. Chief complaint of balance and dizziness as well as nausea when she bends over.  Working with PT outpatient last week.

## 2024-03-07 ENCOUNTER — LAB (OUTPATIENT)
Dept: LAB | Facility: CLINIC | Age: 69
End: 2024-03-07
Payer: COMMERCIAL

## 2024-03-07 ENCOUNTER — ANTICOAGULATION THERAPY VISIT (OUTPATIENT)
Dept: ANTICOAGULATION | Facility: CLINIC | Age: 69
End: 2024-03-07

## 2024-03-07 DIAGNOSIS — I48.20 CHRONIC ATRIAL FIBRILLATION (H): ICD-10-CM

## 2024-03-07 DIAGNOSIS — I48.19 PERSISTENT ATRIAL FIBRILLATION (H): ICD-10-CM

## 2024-03-07 DIAGNOSIS — I48.19 PERSISTENT ATRIAL FIBRILLATION (H): Primary | ICD-10-CM

## 2024-03-07 LAB — INR BLD: 1.9 (ref 0.9–1.1)

## 2024-03-07 PROCEDURE — 85610 PROTHROMBIN TIME: CPT

## 2024-03-07 PROCEDURE — 36416 COLLJ CAPILLARY BLOOD SPEC: CPT

## 2024-03-07 NOTE — PROGRESS NOTES
ANTICOAGULATION MANAGEMENT     Cameron Mariano 68 year old male is on warfarin with subtherapeutic INR result. (Goal INR 2.0-3.0)    Recent labs: (last 7 days)     03/07/24  0923   INR 1.9*       ASSESSMENT     Source(s): Chart Review  Previous INR was Therapeutic last 2(+) visits  Medication, diet, health changes since last INR chart reviewed; none identified         PLAN     Unable to reach Cameron today.    LMTCB    Follow up required to confirm warfarin dose taken and assess for changes and discuss out of range result     Claritza Cheung RN  Anticoagulation Clinic  3/7/2024

## 2024-03-07 NOTE — PROGRESS NOTES
ANTICOAGULATION MANAGEMENT     Cameron Mariano 68 year old male is on warfarin with subtherapeutic INR result. (Goal INR 2.0-3.0)    Recent labs: (last 7 days)     03/07/24  0923   INR 1.9*       ASSESSMENT     Source(s): Chart Review and Patient/Caregiver Call     Warfarin doses taken: Held for procedure  recently which may be affecting INR  Diet: No new diet changes identified  Medication/supplement changes: None noted  New illness, injury, or hospitalization: No  Signs or symptoms of bleeding or clotting: No  Previous result: Therapeutic last 2(+) visits  Additional findings: None       PLAN     Recommended plan for temporary change(s) affecting INR     Dosing Instructions: booster dose then continue your current warfarin dose with next INR in 2 weeks       Summary  As of 3/7/2024      Full warfarin instructions:  3/7: 7.5 mg; Otherwise 7.5 mg every Tue, Fri; 5 mg all other days   Next INR check:  3/21/2024               Telephone call with Cameron who verbalizes understanding and agrees to plan    Lab visit scheduled    Education provided:   Goal range and lab monitoring: goal range and significance of current result    Plan made per [facility] anticoagulation protocol    Claritza Cheung RN  Anticoagulation Clinic  3/7/2024    _______________________________________________________________________     Anticoagulation Episode Summary       Current INR goal:  2.0-3.0   TTR:  61.6% (7.4 mo)   Target end date:  Indefinite   Send INR reminders to:  [facility]    Indications    Persistent atrial fibrillation (H) [I48.19]  Chronic atrial fibrillation (H) [I48.20]             Comments:               Anticoagulation Care Providers       Provider Role Specialty Phone number    Tami Calabrese MD Referring Cardiovascular Disease [phone]    Pablo Benz MD Referring Family Medicine [phone]

## 2024-03-21 ENCOUNTER — LAB (OUTPATIENT)
Dept: LAB | Facility: CLINIC | Age: 69
End: 2024-03-21
Payer: COMMERCIAL

## 2024-03-21 ENCOUNTER — ANTICOAGULATION THERAPY VISIT (OUTPATIENT)
Dept: ANTICOAGULATION | Facility: CLINIC | Age: 69
End: 2024-03-21

## 2024-03-21 DIAGNOSIS — I48.19 PERSISTENT ATRIAL FIBRILLATION (H): Primary | ICD-10-CM

## 2024-03-21 DIAGNOSIS — I48.20 CHRONIC ATRIAL FIBRILLATION (H): ICD-10-CM

## 2024-03-21 DIAGNOSIS — I48.19 PERSISTENT ATRIAL FIBRILLATION (H): ICD-10-CM

## 2024-03-21 LAB — INR BLD: 2.8 (ref 0.9–1.1)

## 2024-03-21 PROCEDURE — 85610 PROTHROMBIN TIME: CPT

## 2024-03-21 PROCEDURE — 36416 COLLJ CAPILLARY BLOOD SPEC: CPT

## 2024-03-21 NOTE — PROGRESS NOTES
ANTICOAGULATION MANAGEMENT     Cameron Mariano 68 year old male is on warfarin with therapeutic INR result. (Goal INR 2.0-3.0)    Recent labs: (last 7 days)     03/21/24  0934   INR 2.8*       ASSESSMENT     Source(s): Chart Review and Patient/Caregiver Call     Warfarin doses taken: Warfarin taken as instructed  Diet: No new diet changes identified  Medication/supplement changes: None noted  New illness, injury, or hospitalization: No  Signs or symptoms of bleeding or clotting: No  Previous result: Subtherapeutic  Additional findings: None     PLAN     Recommended plan for no diet, medication or health factor changes affecting INR     Dosing Instructions: Continue your current warfarin dose with next INR in 3 weeks       Summary  As of 3/21/2024      Full warfarin instructions:  7.5 mg every Tue, Fri; 5 mg all other days   Next INR check:  4/11/2024               Telephone call with Cameron who verbalizes understanding and agrees to plan and ; made aware of patient survey and encouraged to participate.    Lab visit scheduled    Education provided:   Please call back if any changes to your diet, medications or how you've been taking warfarin    Plan made per [facility] anticoagulation protocol    Selene Flores RN  Anticoagulation Clinic  3/21/2024    _______________________________________________________________________     Anticoagulation Episode Summary       Current INR goal:  2.0-3.0   TTR:  63.2% (7.9 mo)   Target end date:  Indefinite   Send INR reminders to:  [facility]    Indications    Persistent atrial fibrillation (H) [I48.19]  Chronic atrial fibrillation (H) [I48.20]             Comments:               Anticoagulation Care Providers       Provider Role Specialty Phone number    Tami Calabrese MD Referring Cardiovascular Disease [phone]    Pablo Benz MD Referring Family Medicine [phone]

## 2024-03-29 ENCOUNTER — ORDERS ONLY (AUTO-RELEASED) (OUTPATIENT)
Dept: CARDIOLOGY | Facility: CLINIC | Age: 69
End: 2024-03-29
Payer: COMMERCIAL

## 2024-03-29 DIAGNOSIS — I48.19 PERSISTENT ATRIAL FIBRILLATION (H): ICD-10-CM

## 2024-04-11 ENCOUNTER — ANTICOAGULATION THERAPY VISIT (OUTPATIENT)
Dept: ANTICOAGULATION | Facility: CLINIC | Age: 69
End: 2024-04-11

## 2024-04-11 ENCOUNTER — LAB (OUTPATIENT)
Dept: LAB | Facility: CLINIC | Age: 69
End: 2024-04-11
Payer: COMMERCIAL

## 2024-04-11 DIAGNOSIS — I48.20 CHRONIC ATRIAL FIBRILLATION (H): ICD-10-CM

## 2024-04-11 DIAGNOSIS — I48.19 PERSISTENT ATRIAL FIBRILLATION (H): Primary | ICD-10-CM

## 2024-04-11 DIAGNOSIS — I48.19 PERSISTENT ATRIAL FIBRILLATION (H): ICD-10-CM

## 2024-04-11 LAB — INR BLD: 2.9 (ref 0.9–1.1)

## 2024-04-11 PROCEDURE — 85610 PROTHROMBIN TIME: CPT

## 2024-04-11 PROCEDURE — 36416 COLLJ CAPILLARY BLOOD SPEC: CPT

## 2024-04-11 NOTE — PROGRESS NOTES
ANTICOAGULATION MANAGEMENT     Cameron Mariano 68 year old male is on warfarin with therapeutic INR result. (Goal INR 2.0-3.0)    Recent labs: (last 7 days)     04/11/24  1007   INR 2.9*       ASSESSMENT     Source(s): Chart Review and Patient/Caregiver Call     Warfarin doses taken: Warfarin taken as instructed  Diet: No new diet changes identified  Medication/supplement changes: None noted  New illness, injury, or hospitalization: No  Signs or symptoms of bleeding or clotting: No  Previous result: Therapeutic last visit; previously outside of goal range  Additional findings: None       PLAN     Recommended plan for no diet, medication or health factor changes affecting INR     Dosing Instructions: Continue your current warfarin dose with next INR in 4 weeks       Summary  As of 4/11/2024      Full warfarin instructions:  7.5 mg every Tue, Fri; 5 mg all other days   Next INR check:  5/9/2024               Telephone call with Cameron who verbalizes understanding and agrees to plan  Sent [employer] message with dosing and follow up instructions    Lab visit scheduled    Education provided:   Please call back if any changes to your diet, medications or how you've been taking warfarin  Contact [phone]  with any changes, questions or concerns.     Plan made per [facility] anticoagulation protocol    Sonia Baez RN  Anticoagulation Clinic  4/11/2024    _______________________________________________________________________     Anticoagulation Episode Summary       Current INR goal:  2.0-3.0   TTR:  66.2% (8.6 mo)   Target end date:  Indefinite   Send INR reminders to:  [facility]    Indications    Persistent atrial fibrillation (H) [I48.19]  Chronic atrial fibrillation (H) [I48.20]             Comments:               Anticoagulation Care Providers       Provider Role Specialty Phone number    Tami Calabrese MD Referring Cardiovascular Disease [phone]    Pablo Benz MD Referring Family Medicine [phone]

## 2024-04-12 PROCEDURE — 93244 EXT ECG>48HR<7D REV&INTERPJ: CPT | Performed by: INTERNAL MEDICINE

## 2024-04-15 ENCOUNTER — OFFICE VISIT (OUTPATIENT)
Dept: CARDIOLOGY | Facility: CLINIC | Age: 69
End: 2024-04-15
Payer: COMMERCIAL

## 2024-04-15 VITALS
WEIGHT: 203 LBS | OXYGEN SATURATION: 98 % | HEIGHT: 70 IN | SYSTOLIC BLOOD PRESSURE: 104 MMHG | BODY MASS INDEX: 29.06 KG/M2 | HEART RATE: 76 BPM | DIASTOLIC BLOOD PRESSURE: 71 MMHG

## 2024-04-15 DIAGNOSIS — I42.8 NONISCHEMIC CARDIOMYOPATHY (H): ICD-10-CM

## 2024-04-15 DIAGNOSIS — I48.21 PERMANENT ATRIAL FIBRILLATION (H): Primary | ICD-10-CM

## 2024-04-15 PROCEDURE — 99214 OFFICE O/P EST MOD 30 MIN: CPT | Performed by: INTERNAL MEDICINE

## 2024-04-15 NOTE — NURSING NOTE
"Chief Complaint   Patient presents with    Follow Up       Initial /71   Pulse 76   Ht 1.778 m (5' 10\")   Wt 92.1 kg (203 lb)   SpO2 98%   BMI 29.13 kg/m   Estimated body mass index is 29.13 kg/m  as calculated from the following:    Height as of this encounter: 1.778 m (5' 10\").    Weight as of this encounter: 92.1 kg (203 lb)..  BP completed using cuff size: amy Grubbs CMA (AAMA)    "

## 2024-04-15 NOTE — PATIENT INSTRUCTIONS
Thank you for coming to the [facility] at [place]; please note the following instructions:    1. Cardiology Providers: Dr. Tami Calabrese would like you to return for a cardiac follow up in 1 year (April 2025).  We will contact you regarding your appointment when the time draws closer or you may call [phone] option #1 to arrange an appointment. Mean while, if you should have any questions or concerns regarding your heart health, please contact us. Thank you for choosing [facility] for your care.    Informed patient that Zio monitor will be mailed to their home and should arrive within 3-4 weeks prior to your follow up.  Informed patient to follow the detailed instructions in the box and to call [employer] (number provided on instructions) with any questions.  Confirmed patients mailing address.            If you have any questions regarding your visit, please contact your care team:     CARDIOLOGY  TELEPHONE NUMBER   Tina DENISEDora, Registered Nurse  Tania RODAS, Registered Nurse  July RAMIREZ, Registered Nurse  Sindy SHERMAN, Registered Medical Assistant  Radha WU, Certified Medical Assistant  Yuli ANDRES, Clinic Assistant [phone] (select option 1)    *After hours: [phone]   For Scheduling Appts:     [phone] (select option 1)    *After hours: [phone]   For the Device Clinic (Pacemakers and ICD's)  Ana WHITNEY, Registered Nurse   During business hours: [phone]    *After business hours:  [phone] (select option 4)      Normal test result notifications will be released via adSage or mailed within 7 business days.  All other test results, will be communicated via telephone once reviewed by your cardiologist.    If you need a medication refill, please contact your pharmacy.  Please allow 3 business days for your refill to be completed.    As always, thank you for trusting us with your health care needs!

## 2024-04-15 NOTE — PROGRESS NOTES
HPI: Purpose of visit: Follow-up for atrial fibrillation and nonischemic cardiomyopathy      Patient is a 68-year-old gentleman with a history of permanent atrial fibrillation, hx of pulmonary vein isolation in March 2021 and nonischemic cardiomyopathy.     Patient's last visit with me was in January 2024.  At that time, patient was recovering from an RSV infection.  In the last 2 months, patient has been doing well.  He is currently retired and remains active around the house.  He did not report any symptoms of palpitations, irregular heartbeat sensation, exertional dyspnea, exertional angina, frequent lightheadedness, presyncope or syncope.     A transthoracic echocardiogram in January 2024 showed stable ejection fraction at 45 to 50%.  A 3-day Zio patch monitor showed average ventricular rate of 78 bpm    PAST MEDICAL HISTORY:  Past Medical History:   Diagnosis Date    Adhesive capsulitis of left shoulder 4/13/2019    Atrial fibrillation (H)     History of alcohol abuse     Hypertension     MEDICAL HISTORY OF -     cardioversion X 2    Other primary cardiomyopathies     Cardiomyopathy    Peyronie's disease        CURRENT MEDICATIONS:  Current Outpatient Medications   Medication Sig Dispense Refill    aspirin 81 MG tablet Take 1 tablet by mouth daily.      atorvastatin (LIPITOR) 40 MG tablet Take 1 tablet (40 mg) by mouth daily for cholesterol Pharmacy ok to hold prescription until due 90 tablet 3    benzonatate (TESSALON) 100 MG capsule Take 1 capsule (100 mg) by mouth 3 times daily as needed for cough 30 capsule 0    diltiazem ER COATED BEADS (CARDIZEM CD/CARTIA XT) 240 MG 24 hr capsule Take 1 capsule (240 mg) by mouth daily 90 capsule 3    fish oil-omega-3 fatty acids 1000 MG capsule Take 2 g by mouth daily.      lisinopril (ZESTRIL) 5 MG tablet Take 1 tablet (5 mg) by mouth daily 90 tablet 3    metoprolol succinate ER (TOPROL XL) 100 MG 24 hr tablet Take 1 tablet (100 mg) by mouth daily 90 tablet 3     "MULTI-VITAMIN OR TABS 1 TABLET DAILY      sildenafil (REVATIO) 20 MG tablet TAKE ONE TO TWO TABLETS BY MOUTH ONCE DAILY AS NEEDED FOR  ED,  NEVER  USE  WITH  NITROGLYCERIN,  TERAZOSIN  OR  DOXAZOSIN 20 tablet 3    warfarin ANTICOAGULANT (COUMADIN) 5 MG tablet Take 7.5 mg every Tue, Fri; 5 mg all other days or As directed by Anticoagulation clinic 110 tablet 1    Cholecalciferol (VITAMIN D) 400 UNITS capsule Take 1 capsule by mouth daily (Patient not taking: Reported on 4/15/2024)         PAST SURGICAL HISTORY:  Past Surgical History:   Procedure Laterality Date    APPENDECTOMY      EP ABLATION FOCAL AFIB N/A 3/12/2021    Procedure: EP ABLATION FOCAL AFIB;  Surgeon: Tami Calabrese MD;  Location:  HEART CARDIAC CATH LAB       ALLERGIES:     Allergies   Allergen Reactions    Aspartame Hives     Hands and feet swell, gets rash    Other Food Allergy      Mayonnaise per pt       FAMILY HISTORY:  - Premature coronary artery disease  - Atrial fibrillation  - Sudden cardiac death     SOCIAL HISTORY:  Social History     Tobacco Use    Smoking status: Never    Smokeless tobacco: Never   Vaping Use    Vaping status: Never Used   Substance Use Topics    Alcohol use: Not Currently    Drug use: Not Currently     Types: Marijuana       ROS:   Constitutional: No fever, chills, or sweats. Weight stable.   ENT: No visual disturbance, ear ache, epistaxis, sore throat.   Cardiovascular: As per HPI.   Respiratory: No cough, hemoptysis.    GI: No nausea, vomiting, hematemesis, melena, or hematochezia.   : No hematuria.   Integument: Negative.   Psychiatric: Negative.   Hematologic:  Easy bruising, no easy bleeding.  Neuro: Negative.   Endocrinology: No significant heat or cold intolerance   Musculoskeletal: No myalgia.    Exam:  /71   Pulse 76   Ht 1.778 m (5' 10\")   Wt 92.1 kg (203 lb)   SpO2 98%   BMI 29.13 kg/m    GENERAL APPEARANCE: healthy, alert and no distress  HEENT: no icterus, no xanthelasmas, normal pupil size " and reaction, normal palate, mucosa moist, no central cyanosis  NECK: no adenopathy, no asymmetry, masses, or scars, thyroid normal to palpation and no bruits, JVP not elevated  RESPIRATORY: lungs clear to auscultation - no rales, rhonchi or wheezes, no use of accessory muscles, no retractions, respirations are unlabored, normal respiratory rate  CARDIOVASCULAR: irregular rhythm.  ABDOMEN: soft, non tender, without hepatosplenomegaly, no masses palpable, bowel sounds normal, aorta not enlarged by palpation, no abdominal bruits  EXTREMITIES: peripheral pulses normal, no edema, no bruits  NEURO: alert and oriented to person/place/time, normal speech, gait and affect  VASC: Radial, femoral, dorsalis pedis and posterior tibialis pulses are normal in volumes and symmetric bilaterally. No bruits are heard.  SKIN: no ecchymoses, no rashes    Labs:  CBC RESULTS:   Lab Results   Component Value Date    WBC 8.3 10/21/2022    WBC 9.1 03/12/2021    RBC 4.42 10/21/2022    RBC 4.76 03/12/2021    HGB 14.8 10/21/2022    HGB 15.7 03/12/2021    HCT 43.5 10/21/2022    HCT 46.5 03/12/2021    MCV 98 10/21/2022    MCV 98 03/12/2021    MCH 33.5 (H) 10/21/2022    MCH 33.0 03/12/2021    MCHC 34.0 10/21/2022    MCHC 33.8 03/12/2021    RDW 14.0 10/21/2022    RDW 13.7 03/12/2021     10/21/2022     03/12/2021       BMP RESULTS:  Lab Results   Component Value Date     06/26/2023     03/12/2021    POTASSIUM 4.6 06/26/2023    POTASSIUM 4.4 10/21/2022    POTASSIUM 4.4 03/12/2021    CHLORIDE 103 06/26/2023    CHLORIDE 106 10/21/2022    CHLORIDE 109 03/12/2021    CO2 27 06/26/2023    CO2 31 10/21/2022    CO2 26 03/12/2021    ANIONGAP 8 06/26/2023    ANIONGAP 3 10/21/2022    ANIONGAP 5 03/12/2021    GLC 97 06/26/2023    GLC 96 10/21/2022     (H) 03/12/2021    BUN 9.7 06/26/2023    BUN 16 10/21/2022    BUN 17 03/12/2021    CR 0.81 06/26/2023    CR 0.75 03/12/2021    GFRESTIMATED >90 06/26/2023    GFRESTIMATED >90  03/12/2021    GFRESTBLACK >90 03/12/2021    IRIS 9.4 06/26/2023    IRIS 9.2 03/12/2021        INR RESULTS:  Lab Results   Component Value Date    INR 2.9 (H) 04/11/2024    INR 2.8 (H) 03/21/2024    INR 1.9 (H) 03/07/2024    INR 2.7 (H) 02/20/2024    INR 1.10 06/28/2021    INR 2.10 (H) 06/15/2021    INR 2.40 (H) 05/18/2021    INR 2.00 (H) 04/27/2021       Procedures:      Assessment and Plan:       Permanent atrial fibrillation-ventricular rate well-controlled     Nonischemic cardiomyopathy-stable ejection fraction of 45 to 50%    It is encouraging that patient's ventricular rate is well-controlled by metoprolol succinate 100 mg once daily.  Patient will discontinue aspirin.  I will see patient again in person in 1 year and prior to that visit, patient will wear a 3-day Zio patch monitor to assess ventricular rate in the transthoracic echocardiogram to define the left ventricular ejection fraction.  All questions concerns were addressed and patient was happy with the plan.      CC  Patient Care Team:  Pablo Benz MD as PCP - General (Family Practice)  Tami Calabrese MD as Assigned Heart and Vascular Provider  Pablo Benz MD as Assigned PCP

## 2024-04-15 NOTE — LETTER
4/15/2024      RE: Cameron Mariano  [address]  [facility] [address]       Dear Colleague,    Thank you for the opportunity to participate in the care of your patient, Cameron Mariano, at the [facility]. Please see a copy of my visit note below.    HPI: Purpose of visit: Follow-up for atrial fibrillation and nonischemic cardiomyopathy      Patient is a 68-year-old gentleman with a history of permanent atrial fibrillation, hx of pulmonary vein isolation in March 2021 and nonischemic cardiomyopathy.     Patient's last visit with me was in January 2024.  At that time, patient was recovering from an RSV infection.  In the last 2 months, patient has been doing well.  He is currently retired and remains active around the house.  He did not report any symptoms of palpitations, irregular heartbeat sensation, exertional dyspnea, exertional angina, frequent lightheadedness, presyncope or syncope.     A transthoracic echocardiogram in January 2024 showed stable ejection fraction at 45 to 50%.  A 3-day Zio patch monitor showed average ventricular rate of 78 bpm    PAST MEDICAL HISTORY:  Past Medical History:   Diagnosis Date    Adhesive capsulitis of left shoulder 4/13/2019    Atrial fibrillation (H)     History of alcohol abuse     Hypertension     MEDICAL HISTORY OF -     cardioversion X 2    Other primary cardiomyopathies     Cardiomyopathy    Peyronie's disease        CURRENT MEDICATIONS:  Current Outpatient Medications   Medication Sig Dispense Refill    aspirin 81 MG tablet Take 1 tablet by mouth daily.      atorvastatin (LIPITOR) 40 MG tablet Take 1 tablet (40 mg) by mouth daily for cholesterol Pharmacy ok to hold prescription until due 90 tablet 3    benzonatate (TESSALON) 100 MG capsule Take 1 capsule (100 mg) by mouth 3 times daily as needed for cough 30 capsule 0    diltiazem ER COATED BEADS (CARDIZEM CD/CARTIA XT) 240 MG 24  hr capsule Take 1 capsule (240 mg) by mouth daily 90 capsule 3    fish oil-omega-3 fatty acids 1000 MG capsule Take 2 g by mouth daily.      lisinopril (ZESTRIL) 5 MG tablet Take 1 tablet (5 mg) by mouth daily 90 tablet 3    metoprolol succinate ER (TOPROL XL) 100 MG 24 hr tablet Take 1 tablet (100 mg) by mouth daily 90 tablet 3    MULTI-VITAMIN OR TABS 1 TABLET DAILY      sildenafil (REVATIO) 20 MG tablet TAKE ONE TO TWO TABLETS BY MOUTH ONCE DAILY AS NEEDED FOR  ED,  NEVER  USE  WITH  NITROGLYCERIN,  TERAZOSIN  OR  DOXAZOSIN 20 tablet 3    warfarin ANTICOAGULANT (COUMADIN) 5 MG tablet Take 7.5 mg every Tue, Fri; 5 mg all other days or As directed by Anticoagulation clinic 110 tablet 1    Cholecalciferol (VITAMIN D) 400 UNITS capsule Take 1 capsule by mouth daily (Patient not taking: Reported on 4/15/2024)         PAST SURGICAL HISTORY:  Past Surgical History:   Procedure Laterality Date    APPENDECTOMY      EP ABLATION FOCAL AFIB N/A 3/12/2021    Procedure: EP ABLATION FOCAL AFIB;  Surgeon: Tami Calabrese MD;  Location:  HEART CARDIAC CATH LAB       ALLERGIES:     Allergies   Allergen Reactions    Aspartame Hives     Hands and feet swell, gets rash    Other Food Allergy      Mayonnaise per pt       FAMILY HISTORY:  - Premature coronary artery disease  - Atrial fibrillation  - Sudden cardiac death     SOCIAL HISTORY:  Social History     Tobacco Use    Smoking status: Never    Smokeless tobacco: Never   Vaping Use    Vaping status: Never Used   Substance Use Topics    Alcohol use: Not Currently    Drug use: Not Currently     Types: Marijuana       ROS:   Constitutional: No fever, chills, or sweats. Weight stable.   ENT: No visual disturbance, ear ache, epistaxis, sore throat.   Cardiovascular: As per HPI.   Respiratory: No cough, hemoptysis.    GI: No nausea, vomiting, hematemesis, melena, or hematochezia.   : No hematuria.   Integument: Negative.   Psychiatric: Negative.   Hematologic:  Easy bruising, no easy  "bleeding.  Neuro: Negative.   Endocrinology: No significant heat or cold intolerance   Musculoskeletal: No myalgia.    Exam:  /71   Pulse 76   Ht 1.778 m (5' 10\")   Wt 92.1 kg (203 lb)   SpO2 98%   BMI 29.13 kg/m    GENERAL APPEARANCE: healthy, alert and no distress  HEENT: no icterus, no xanthelasmas, normal pupil size and reaction, normal palate, mucosa moist, no central cyanosis  NECK: no adenopathy, no asymmetry, masses, or scars, thyroid normal to palpation and no bruits, JVP not elevated  RESPIRATORY: lungs clear to auscultation - no rales, rhonchi or wheezes, no use of accessory muscles, no retractions, respirations are unlabored, normal respiratory rate  CARDIOVASCULAR: irregular rhythm.  ABDOMEN: soft, non tender, without hepatosplenomegaly, no masses palpable, bowel sounds normal, aorta not enlarged by palpation, no abdominal bruits  EXTREMITIES: peripheral pulses normal, no edema, no bruits  NEURO: alert and oriented to person/place/time, normal speech, gait and affect  VASC: Radial, femoral, dorsalis pedis and posterior tibialis pulses are normal in volumes and symmetric bilaterally. No bruits are heard.  SKIN: no ecchymoses, no rashes    Labs:  CBC RESULTS:   Lab Results   Component Value Date    WBC 8.3 10/21/2022    WBC 9.1 03/12/2021    RBC 4.42 10/21/2022    RBC 4.76 03/12/2021    HGB 14.8 10/21/2022    HGB 15.7 03/12/2021    HCT 43.5 10/21/2022    HCT 46.5 03/12/2021    MCV 98 10/21/2022    MCV 98 03/12/2021    MCH 33.5 (H) 10/21/2022    MCH 33.0 03/12/2021    MCHC 34.0 10/21/2022    MCHC 33.8 03/12/2021    RDW 14.0 10/21/2022    RDW 13.7 03/12/2021     10/21/2022     03/12/2021       BMP RESULTS:  Lab Results   Component Value Date     06/26/2023     03/12/2021    POTASSIUM 4.6 06/26/2023    POTASSIUM 4.4 10/21/2022    POTASSIUM 4.4 03/12/2021    CHLORIDE 103 06/26/2023    CHLORIDE 106 10/21/2022    CHLORIDE 109 03/12/2021    CO2 27 06/26/2023    CO2 31 " 10/21/2022    CO2 26 03/12/2021    ANIONGAP 8 06/26/2023    ANIONGAP 3 10/21/2022    ANIONGAP 5 03/12/2021    GLC 97 06/26/2023    GLC 96 10/21/2022     (H) 03/12/2021    BUN 9.7 06/26/2023    BUN 16 10/21/2022    BUN 17 03/12/2021    CR 0.81 06/26/2023    CR 0.75 03/12/2021    GFRESTIMATED >90 06/26/2023    GFRESTIMATED >90 03/12/2021    GFRESTBLACK >90 03/12/2021    IRIS 9.4 06/26/2023    IRIS 9.2 03/12/2021        INR RESULTS:  Lab Results   Component Value Date    INR 2.9 (H) 04/11/2024    INR 2.8 (H) 03/21/2024    INR 1.9 (H) 03/07/2024    INR 2.7 (H) 02/20/2024    INR 1.10 06/28/2021    INR 2.10 (H) 06/15/2021    INR 2.40 (H) 05/18/2021    INR 2.00 (H) 04/27/2021       Procedures:      Assessment and Plan:       Permanent atrial fibrillation-ventricular rate well-controlled     Nonischemic cardiomyopathy-stable ejection fraction of 45 to 50%    It is encouraging that patient's ventricular rate is well-controlled by metoprolol succinate 100 mg once daily.  Patient will discontinue aspirin.  I will see patient again in person in 1 year and prior to that visit, patient will wear a 3-day Zio patch monitor to assess ventricular rate in the transthoracic echocardiogram to define the left ventricular ejection fraction.  All questions concerns were addressed and patient was happy with the plan.      CC  Patient Care Team:  Pablo Benz MD as PCP - General (Family Practice)      Please do not hesitate to contact me if you have any questions/concerns.     Sincerely,     Tami Calabrese MD

## 2024-05-09 ENCOUNTER — LAB (OUTPATIENT)
Dept: LAB | Facility: CLINIC | Age: 69
End: 2024-05-09
Payer: COMMERCIAL

## 2024-05-09 ENCOUNTER — ANTICOAGULATION THERAPY VISIT (OUTPATIENT)
Dept: ANTICOAGULATION | Facility: CLINIC | Age: 69
End: 2024-05-09

## 2024-05-09 DIAGNOSIS — I48.19 PERSISTENT ATRIAL FIBRILLATION (H): ICD-10-CM

## 2024-05-09 DIAGNOSIS — I48.20 CHRONIC ATRIAL FIBRILLATION (H): ICD-10-CM

## 2024-05-09 DIAGNOSIS — I48.19 PERSISTENT ATRIAL FIBRILLATION (H): Primary | ICD-10-CM

## 2024-05-09 LAB — INR BLD: 2.7 (ref 0.9–1.1)

## 2024-05-09 PROCEDURE — 85610 PROTHROMBIN TIME: CPT

## 2024-05-09 PROCEDURE — 36416 COLLJ CAPILLARY BLOOD SPEC: CPT

## 2024-05-09 NOTE — PROGRESS NOTES
ANTICOAGULATION MANAGEMENT     Cameron Mariano 68 year old male is on warfarin with therapeutic INR result. (Goal INR 2.0-3.0)    Recent labs: (last 7 days)     05/09/24  0836   INR 2.7*       ASSESSMENT     Source(s): Chart Review and Patient/Caregiver Call     Warfarin doses taken: Warfarin taken as instructed  Diet: No new diet changes identified  Medication/supplement changes: None noted  New illness, injury, or hospitalization: No  Signs or symptoms of bleeding or clotting: No  Previous result: Therapeutic last 2(+) visits  Additional findings: None       PLAN     Recommended plan for no diet, medication or health factor changes affecting INR     Dosing Instructions: Continue your current warfarin dose with next INR in 5 weeks       Summary  As of 5/9/2024      Full warfarin instructions:  7.5 mg every Tue, Fri; 5 mg all other days   Next INR check:  6/13/2024               Telephone call with Cameron who verbalizes understanding and agrees to plan    Lab visit scheduled    Education provided:   Goal range and lab monitoring: goal range and significance of current result    Plan made per [facility] anticoagulation protocol    Claritza Cheung RN  Anticoagulation Clinic  5/9/2024    _______________________________________________________________________     Anticoagulation Episode Summary       Current INR goal:  2.0-3.0   TTR:  69.5% (9.5 mo)   Target end date:  Indefinite   Send INR reminders to:  [facility]    Indications    Persistent atrial fibrillation (H) [I48.19]  Chronic atrial fibrillation (H) [I48.20]             Comments:               Anticoagulation Care Providers       Provider Role Specialty Phone number    Tami Calabrese MD Referring Cardiovascular Disease [phone]    Pablo Benz MD Referring Family Medicine [phone]

## 2024-05-22 ENCOUNTER — TELEPHONE (OUTPATIENT)
Dept: FAMILY MEDICINE | Facility: CLINIC | Age: 69
End: 2024-05-22
Payer: COMMERCIAL

## 2024-05-22 NOTE — TELEPHONE ENCOUNTER
Writer huddled with a provider on site to discuss call and situation.  Called back and spoke with patient about plan.    Per provider, Ayden Mckeon PA-C, patient to monitor self and watch for symptoms. Hold Warfarin dose for tomorrow. Monitor for lower heart rate given the dose of Diltiazem given.   If develops symptoms, needs to go to ER or call 911.  Poison Control number given to patient to contact for additional support ([phone]).  Patient instructed to contact his INR nurse this evening or by tomorrow morning to give update on taking double Warfarin dose today.  Monitor blood pressure at home, at least once today to see where is at.  Okay to resume normal blood pressure medication doses tomorrow.  Reviewed symptoms that warrant going to ER or calling 911, like dizziness, abdominal pain, feeling faint or passing out, chest pain or pressure, shallow breathing, trouble breathing, disorientation, sudden vomiting, feeling sleepy or drowsy, weak, trouble walking, abnormal gait.  Encouraged increased water intake.    Patient voiced clear understanding and agreed with all plans and recommendations.         Abigail Joe RN  Clinical Triage/Primary Care  [facility]

## 2024-05-22 NOTE — TELEPHONE ENCOUNTER
"Patient called to clinic to report that he accidentally took double the dosage of a few of his prescribed medications today.    This morning, patient woke up and took his Warfarin 5 mg, Lisinopril 5 mg and Diltiazem 240 mg tablets at 7:00 AM.    Patient then went out to his garage and had been working on a car all day and came back in the house about an hour ago (3:30 PM) and thought he had missed his pills so he took another round of the Warfarin, Lisinopril, and Diltiazem.   Patient then realized and recalled taking pills earlier this morning and had now just taken his \"Thursday\" dose of pills. Uses a pill organizer.    At this time, patient has taken a total of:  Warfarin 10 mg  Lisinopril 10 mg  Diltiazem 480 mg      Patient declined having any symptoms right now.  Denied having abdominal pain or chest pain.  Denied lightheaded or dizziness.  Denied any respiratory distress, any trouble breathing.  Denied feeling disoriented or confused.  Denied any nausea or vomiting.   Denied feeling drowsy or sleepy.  Denied slow or fast heart rate, palpitations.    Patient reports feeling \"just fine\". \" I feel normal\".    Patient asking what to watch for or what he needs to do now or moving forward and into tomorrow.      Per triage for medication (prescribed) overdose, taking DOUBLE DOSE or MORE, indicates to call Poison Control only IF having symptoms.  Patient is not having any symptoms at this time.    Routing to provider team as a SECOND LEVEL review and huddling with provider on site to discuss plan.            Abigail Joe RN  Clinical Triage/Primary Care  [facility]    "

## 2024-05-28 ENCOUNTER — PATIENT OUTREACH (OUTPATIENT)
Dept: CARE COORDINATION | Facility: CLINIC | Age: 69
End: 2024-05-28
Payer: COMMERCIAL

## 2024-06-11 ENCOUNTER — PATIENT OUTREACH (OUTPATIENT)
Dept: CARE COORDINATION | Facility: CLINIC | Age: 69
End: 2024-06-11
Payer: COMMERCIAL

## 2024-06-13 ENCOUNTER — DOCUMENTATION ONLY (OUTPATIENT)
Dept: ANTICOAGULATION | Facility: CLINIC | Age: 69
End: 2024-06-13

## 2024-06-13 ENCOUNTER — LAB (OUTPATIENT)
Dept: LAB | Facility: CLINIC | Age: 69
End: 2024-06-13
Payer: COMMERCIAL

## 2024-06-13 ENCOUNTER — ANTICOAGULATION THERAPY VISIT (OUTPATIENT)
Dept: ANTICOAGULATION | Facility: CLINIC | Age: 69
End: 2024-06-13

## 2024-06-13 DIAGNOSIS — Z79.01 LONG TERM CURRENT USE OF ANTICOAGULANT THERAPY: ICD-10-CM

## 2024-06-13 DIAGNOSIS — I48.20 CHRONIC ATRIAL FIBRILLATION (H): ICD-10-CM

## 2024-06-13 DIAGNOSIS — I48.19 PERSISTENT ATRIAL FIBRILLATION (H): ICD-10-CM

## 2024-06-13 DIAGNOSIS — I48.19 PERSISTENT ATRIAL FIBRILLATION (H): Primary | ICD-10-CM

## 2024-06-13 LAB — INR BLD: 2.2 (ref 0.9–1.1)

## 2024-06-13 PROCEDURE — 36416 COLLJ CAPILLARY BLOOD SPEC: CPT

## 2024-06-13 PROCEDURE — 85610 PROTHROMBIN TIME: CPT

## 2024-06-13 NOTE — PROGRESS NOTES
ANTICOAGULATION MANAGEMENT     Cameron Mariano 68 year old male is on warfarin with therapeutic INR result. (Goal INR 2.0-3.0)    Recent labs: (last 7 days)     06/13/24  0825   INR 2.2*       ASSESSMENT     Source(s): Chart Review  Previous INR was Therapeutic last 2(+) visits  Medication, diet, health changes since last INR chart reviewed; none identified         PLAN     Recommended plan for no diet, medication or health factor changes affecting INR     Dosing Instructions: Continue your current warfarin dose with next INR in 6 weeks       Summary  As of 6/13/2024      Full warfarin instructions:  7.5 mg every Tue, Fri; 5 mg all other days   Next INR check:  7/25/2024               Detailed voice message left for Cameron with dosing instructions and follow up date.     Contact [phone]  to schedule and with any changes, questions or concerns.     Education provided:   Please call back if any changes to your diet, medications or how you've been taking warfarin    Plan made per [facility] anticoagulation protocol    Camilla Odonnell RN  Anticoagulation Clinic  6/13/2024    _______________________________________________________________________     Anticoagulation Episode Summary       Current INR goal:  2.0-3.0   TTR:  72.9% (10.7 mo)   Target end date:  Indefinite   Send INR reminders to:  [facility]    Indications    Persistent atrial fibrillation (H) [I48.19]  Chronic atrial fibrillation (H) [I48.20]             Comments:               Anticoagulation Care Providers       Provider Role Specialty Phone number    Tami Calabrese MD Referring Cardiovascular Disease [phone]    Pablo Benz MD Referring Family Medicine [phone]

## 2024-06-13 NOTE — PROGRESS NOTES
ANTICOAGULATION CLINIC REFERRAL RENEWAL REQUEST       An annual renewal order is required for all patients referred to [facility].?  Please review and sign the pended referral order for Cameron Mariano.       ANTICOAGULATION SUMMARY      Warfarin indication(s)   Atrial Fibrillation    Mechanical heart valve present?  NO       Current goal range   INR: 2.0-3.0     Goal appropriate for indication? Goal INR 2-3, standard for indication(s) above     Time in Therapeutic Range (TTR)  (Goal > 60%) 72.9%       Office visit with referring provider's group within last year Due for yearly office visit, last on 6/26/23 (patient was seen in clinic in 2/24 for sinusitis)       Camilla Odonnell RN  [facility]

## 2024-06-17 ENCOUNTER — TELEPHONE (OUTPATIENT)
Dept: FAMILY MEDICINE | Facility: CLINIC | Age: 69
End: 2024-06-17
Payer: COMMERCIAL

## 2024-06-17 NOTE — TELEPHONE ENCOUNTER
General Call    Contacts         Type Contact Phone/Fax    06/17/2024 11:13 AM CDT Phone (Incoming) Cameron Mariano (Self) [phone] (H)          Reason for Call:  pt calling back due to missed ACN call from last week.     What are your questions or concerns:  patient states he missed inr nurse call from last week and then went out of town so was calling back today to discuss.  Pt did schedule next inr lab appt for 7/25/24. However does still wish to speak with INR nurse.     Date of last appointment with provider:     Could we send this information to you in [employer] or would you prefer to receive a phone call?:   Patient would prefer a phone call   Okay to leave a detailed message?: Yes at Cell number on file:    Telephone Information:   Mobile [phone]

## 2024-06-17 NOTE — TELEPHONE ENCOUNTER
Left message for patient to return call to [facility].    Camilla Odonnell RN, BSN  [facility]ticoagulation [facility]  [phone]

## 2024-06-18 NOTE — TELEPHONE ENCOUNTER
Another message left for patient to return call to [facility]. Message below is unclear what additional questions patient has for [facility] RN. Dosing was given and patient has follow up INR scheduled. Requested that if patient has any additional questions or concerns, to please reach out to [facility].     Camilla Odonnell RN, BSN  [facility]  [phone]

## 2024-07-04 ENCOUNTER — PATIENT OUTREACH (OUTPATIENT)
Dept: CARE COORDINATION | Facility: CLINIC | Age: 69
End: 2024-07-04
Payer: COMMERCIAL

## 2024-08-02 ENCOUNTER — LAB (OUTPATIENT)
Dept: LAB | Facility: CLINIC | Age: 69
End: 2024-08-02
Payer: COMMERCIAL

## 2024-08-02 ENCOUNTER — ANTICOAGULATION THERAPY VISIT (OUTPATIENT)
Dept: ANTICOAGULATION | Facility: CLINIC | Age: 69
End: 2024-08-02

## 2024-08-02 DIAGNOSIS — I48.19 PERSISTENT ATRIAL FIBRILLATION (H): Primary | ICD-10-CM

## 2024-08-02 DIAGNOSIS — I48.20 CHRONIC ATRIAL FIBRILLATION (H): ICD-10-CM

## 2024-08-02 DIAGNOSIS — I48.19 PERSISTENT ATRIAL FIBRILLATION (H): ICD-10-CM

## 2024-08-02 DIAGNOSIS — Z79.01 LONG TERM CURRENT USE OF ANTICOAGULANT THERAPY: ICD-10-CM

## 2024-08-02 LAB — INR BLD: 3.5 (ref 0.9–1.1)

## 2024-08-02 PROCEDURE — 85610 PROTHROMBIN TIME: CPT

## 2024-08-02 PROCEDURE — 36416 COLLJ CAPILLARY BLOOD SPEC: CPT

## 2024-08-02 NOTE — PROGRESS NOTES
ANTICOAGULATION MANAGEMENT     Cameron Mariano 69 year old male is on warfarin with supratherapeutic INR result. (Goal INR 2.0-3.0)    Recent labs: (last 7 days)     08/02/24  1350   INR 3.5*       ASSESSMENT     Source(s): Chart Review and Patient/Caregiver Call     Warfarin doses taken: Warfarin taken as instructed  Diet: No new diet changes identified  Medication/supplement changes: None noted  New illness, injury, or hospitalization: No  Signs or symptoms of bleeding or clotting: No  Previous result: Therapeutic last 2(+) visits  Additional findings: None       PLAN     Recommended plan for no diet, medication or health factor changes affecting INR     Dosing Instructions: partial hold then continue your current warfarin dose (pt did not want to change his dose as its been stable a while now) with next INR in 2 weeks       Summary  As of 8/2/2024      Full warfarin instructions:  8/3: 2.5 mg; Otherwise 7.5 mg every Tue, Fri; 5 mg all other days   Next INR check:  8/16/2024               Telephone call with Cameron     Lab visit scheduled    Education provided: Symptom monitoring: monitoring for bleeding signs and symptoms    Plan made per [facility] anticoagulation protocol    Selena Joseph RN  Anticoagulation Clinic  8/2/2024    _______________________________________________________________________     Anticoagulation Episode Summary       Current INR goal:  2.0-3.0   TTR:  72.4% (1 y)   Target end date:  Indefinite   Send INR reminders to:  [facility]    Indications    Persistent atrial fibrillation (H) [I48.19]  Chronic atrial fibrillation (H) [I48.20]  Long-term (current) use of anticoagulants [Z79.01] [Z79.01]             Comments:               Anticoagulation Care Providers       Provider Role Specialty Phone number    Tami Calabrese MD Referring Cardiovascular Disease [phone]    Pablo Benz MD Referring Family Medicine [phone]

## 2024-08-16 ENCOUNTER — LAB (OUTPATIENT)
Dept: LAB | Facility: CLINIC | Age: 69
End: 2024-08-16
Payer: COMMERCIAL

## 2024-08-16 ENCOUNTER — ANTICOAGULATION THERAPY VISIT (OUTPATIENT)
Dept: ANTICOAGULATION | Facility: CLINIC | Age: 69
End: 2024-08-16

## 2024-08-16 DIAGNOSIS — Z79.01 LONG TERM CURRENT USE OF ANTICOAGULANT THERAPY: ICD-10-CM

## 2024-08-16 DIAGNOSIS — I48.20 CHRONIC ATRIAL FIBRILLATION (H): ICD-10-CM

## 2024-08-16 DIAGNOSIS — I48.19 PERSISTENT ATRIAL FIBRILLATION (H): ICD-10-CM

## 2024-08-16 DIAGNOSIS — I48.19 PERSISTENT ATRIAL FIBRILLATION (H): Primary | ICD-10-CM

## 2024-08-16 LAB — INR BLD: 2.4 (ref 0.9–1.1)

## 2024-08-16 PROCEDURE — 36416 COLLJ CAPILLARY BLOOD SPEC: CPT

## 2024-08-16 PROCEDURE — 85610 PROTHROMBIN TIME: CPT

## 2024-08-16 NOTE — PROGRESS NOTES
ANTICOAGULATION MANAGEMENT     Cameron Mariano 69 year old male is on warfarin with therapeutic INR result. (Goal INR 2.0-3.0)    Recent labs: (last 7 days)     08/16/24  0955   INR 2.4*       ASSESSMENT     Source(s): Chart Review and Patient/Caregiver Call     Warfarin doses taken: Warfarin taken as instructed  Diet: No new diet changes identified  Medication/supplement changes: None noted  New illness, injury, or hospitalization: No  Signs or symptoms of bleeding or clotting: No  Previous result: Supratherapeutic  Additional findings: None       PLAN     Recommended plan for no diet, medication or health factor changes affecting INR     Dosing Instructions: Continue your current warfarin dose with next INR in 4 weeks due to holiday.       Summary  As of 8/16/2024      Full warfarin instructions:  7.5 mg every Tue, Fri; 5 mg all other days   Next INR check:  9/13/2024               Telephone call with Cameron who verbalizes understanding and agrees to plan    Lab visit scheduled    Education provided: Contact [phone] with any changes, questions or concerns.     Plan made per [facility] anticoagulation protocol    Mary Alice FARLEY RN  Anticoagulation Clinic  8/16/2024    _______________________________________________________________________     Anticoagulation Episode Summary       Current INR goal:  2.0-3.0   TTR:  71.2% (1 y)   Target end date:  Indefinite   Send INR reminders to:  [facility]    Indications    Persistent atrial fibrillation (H) [I48.19]  Chronic atrial fibrillation (H) [I48.20]  Long-term (current) use of anticoagulants [Z79.01] [Z79.01]             Comments:               Anticoagulation Care Providers       Provider Role Specialty Phone number    Tami Calabrese MD Referring Cardiovascular Disease [phone]    Pablo Benz MD Referring Family Medicine [phone]

## 2024-08-21 DIAGNOSIS — I48.19 PERSISTENT ATRIAL FIBRILLATION (H): Primary | ICD-10-CM

## 2024-08-21 DIAGNOSIS — I10 HYPERTENSION GOAL BP (BLOOD PRESSURE) < 140/90: ICD-10-CM

## 2024-08-21 RX ORDER — METOPROLOL SUCCINATE 100 MG/1
100 TABLET, EXTENDED RELEASE ORAL DAILY
Qty: 90 TABLET | Refills: 0 | Status: SHIPPED | OUTPATIENT
Start: 2024-08-21

## 2024-08-21 RX ORDER — WARFARIN SODIUM 5 MG/1
TABLET ORAL
Qty: 110 TABLET | Refills: 1 | Status: SHIPPED | OUTPATIENT
Start: 2024-08-21

## 2024-08-21 RX ORDER — LISINOPRIL 5 MG/1
5 TABLET ORAL DAILY
Qty: 90 TABLET | Refills: 0 | Status: SHIPPED | OUTPATIENT
Start: 2024-08-21

## 2024-08-21 NOTE — TELEPHONE ENCOUNTER
ANTICOAGULATION MANAGEMENT:  Medication Refill    Anticoagulation Summary  As of 8/16/2024      Warfarin maintenance plan:  7.5 mg (5 mg x 1.5) every Tue, Fri; 5 mg (5 mg x 1) all other days   Next INR check:  9/13/2024   Target end date:  Indefinite    Indications    Persistent atrial fibrillation (H) [I48.19]  Chronic atrial fibrillation (H) [I48.20]  Long-term (current) use of anticoagulants [Z79.01] [Z79.01]                 Anticoagulation Care Providers       Provider Role Specialty Phone number    Tami Calabrese MD Referring Cardiovascular Disease [phone]    Pablo Benz MD Referring Family Medicine [phone]            Refill Criteria    Visit with referring provider/group: Meets criteria: visit within referring provider group in the last 15 months on 2/2/24    [facility] referral last signed: 06/13/2024; within last year: Yes    Lab monitoring not exceeding 2 weeks overdue: Yes    Cameron meets all criteria for refill. Rx instructions and quantity supplied updated to match patient's current dosing plan. Warfarin 90 day supply with 1 refill granted per [facility] protocol     Jennifer Barriga RN  Anticoagulation Clinic

## 2024-08-27 DIAGNOSIS — I10 HYPERTENSION GOAL BP (BLOOD PRESSURE) < 140/90: ICD-10-CM

## 2024-08-27 RX ORDER — DILTIAZEM HYDROCHLORIDE 240 MG/1
240 CAPSULE, COATED, EXTENDED RELEASE ORAL DAILY
Qty: 90 CAPSULE | Refills: 0 | Status: SHIPPED | OUTPATIENT
Start: 2024-08-27

## 2024-09-08 ENCOUNTER — HEALTH MAINTENANCE LETTER (OUTPATIENT)
Age: 69
End: 2024-09-08

## 2024-09-13 ENCOUNTER — LAB (OUTPATIENT)
Dept: LAB | Facility: CLINIC | Age: 69
End: 2024-09-13
Payer: COMMERCIAL

## 2024-09-13 ENCOUNTER — ANTICOAGULATION THERAPY VISIT (OUTPATIENT)
Dept: ANTICOAGULATION | Facility: CLINIC | Age: 69
End: 2024-09-13

## 2024-09-13 DIAGNOSIS — Z79.01 LONG TERM CURRENT USE OF ANTICOAGULANT THERAPY: ICD-10-CM

## 2024-09-13 DIAGNOSIS — I48.19 PERSISTENT ATRIAL FIBRILLATION (H): ICD-10-CM

## 2024-09-13 DIAGNOSIS — I48.19 PERSISTENT ATRIAL FIBRILLATION (H): Primary | ICD-10-CM

## 2024-09-13 DIAGNOSIS — I48.20 CHRONIC ATRIAL FIBRILLATION (H): ICD-10-CM

## 2024-09-13 LAB — INR BLD: 2.6 (ref 0.9–1.1)

## 2024-09-13 PROCEDURE — 85610 PROTHROMBIN TIME: CPT

## 2024-09-13 PROCEDURE — 36416 COLLJ CAPILLARY BLOOD SPEC: CPT

## 2024-09-13 NOTE — PROGRESS NOTES
ANTICOAGULATION MANAGEMENT     Cameron Mariano 69 year old male is on warfarin with therapeutic INR result. (Goal INR 2.0-3.0)    Recent labs: (last 7 days)     09/13/24  0955   INR 2.6*       ASSESSMENT     Source(s): Chart Review and Patient/Caregiver Call     Warfarin doses taken: Warfarin taken as instructed  Diet: No new diet changes identified  Medication/supplement changes: None noted  New illness, injury, or hospitalization: No  Signs or symptoms of bleeding or clotting: No  Previous result: Therapeutic last visit; previously outside of goal range  Additional findings: None       PLAN     Recommended plan for no diet, medication or health factor changes affecting INR     Dosing Instructions: Continue your current warfarin dose with next INR in 4 weeks       Summary  As of 9/13/2024      Full warfarin instructions:  7.5 mg every Tue, Fri; 5 mg all other days   Next INR check:  10/11/2024               Telephone call with Cameron who verbalizes understanding and agrees to plan and who agrees to plan and repeated back plan correctly    Lab visit scheduled    Education provided: None required    Plan made per [facility] anticoagulation protocol    Selena Joseph RN  9/13/2024  Anticoagulation Clinic  [employer] for routing messages: george IZQUIERDO  [facility] patient phone line: [phone]        _______________________________________________________________________     Anticoagulation Episode Summary       Current INR goal:  2.0-3.0   TTR:  74.2% (1 y)   Target end date:  Indefinite   Send INR reminders to:  [facility]    Indications    Persistent atrial fibrillation (H) [I48.19]  Chronic atrial fibrillation (H) [I48.20]  Long-term (current) use of anticoagulants [Z79.01] [Z79.01]             Comments:               Anticoagulation Care Providers       Provider Role Specialty Phone number    Tami Calabrese MD Referring Cardiovascular Disease [phone]    Pablo Benz MD Referring Family Medicine  [phone]

## 2024-09-26 ENCOUNTER — OFFICE VISIT (OUTPATIENT)
Dept: FAMILY MEDICINE | Facility: CLINIC | Age: 69
End: 2024-09-26
Payer: COMMERCIAL

## 2024-09-26 VITALS
DIASTOLIC BLOOD PRESSURE: 83 MMHG | TEMPERATURE: 97.5 F | SYSTOLIC BLOOD PRESSURE: 130 MMHG | HEIGHT: 70 IN | RESPIRATION RATE: 20 BRPM | BODY MASS INDEX: 28.35 KG/M2 | HEART RATE: 92 BPM | WEIGHT: 198 LBS | OXYGEN SATURATION: 95 %

## 2024-09-26 DIAGNOSIS — Z00.00 ENCOUNTER FOR MEDICARE ANNUAL WELLNESS EXAM: Primary | ICD-10-CM

## 2024-09-26 DIAGNOSIS — Z12.5 SCREENING PSA (PROSTATE SPECIFIC ANTIGEN): ICD-10-CM

## 2024-09-26 DIAGNOSIS — I48.20 CHRONIC ATRIAL FIBRILLATION (H): ICD-10-CM

## 2024-09-26 DIAGNOSIS — E78.5 HYPERLIPIDEMIA LDL GOAL <130: ICD-10-CM

## 2024-09-26 DIAGNOSIS — I10 ESSENTIAL HYPERTENSION WITH GOAL BLOOD PRESSURE LESS THAN 140/90: ICD-10-CM

## 2024-09-26 LAB
ANION GAP SERPL CALCULATED.3IONS-SCNC: 12 MMOL/L (ref 7–15)
BUN SERPL-MCNC: 15.9 MG/DL (ref 8–23)
CALCIUM SERPL-MCNC: 9.3 MG/DL (ref 8.8–10.4)
CHLORIDE SERPL-SCNC: 103 MMOL/L (ref 98–107)
CHOLEST SERPL-MCNC: 242 MG/DL
CREAT SERPL-MCNC: 0.81 MG/DL (ref 0.67–1.17)
EGFRCR SERPLBLD CKD-EPI 2021: >90 ML/MIN/1.73M2
FASTING STATUS PATIENT QL REPORTED: NO
FASTING STATUS PATIENT QL REPORTED: NO
GLUCOSE SERPL-MCNC: 95 MG/DL (ref 70–99)
HCO3 SERPL-SCNC: 25 MMOL/L (ref 22–29)
HDLC SERPL-MCNC: 62 MG/DL
LDLC SERPL CALC-MCNC: 162 MG/DL
NONHDLC SERPL-MCNC: 180 MG/DL
POTASSIUM SERPL-SCNC: 4.2 MMOL/L (ref 3.4–5.3)
PSA SERPL DL<=0.01 NG/ML-MCNC: 1.59 NG/ML (ref 0–4.5)
SODIUM SERPL-SCNC: 140 MMOL/L (ref 135–145)
TRIGL SERPL-MCNC: 91 MG/DL

## 2024-09-26 PROCEDURE — G0008 ADMIN INFLUENZA VIRUS VAC: HCPCS | Performed by: FAMILY MEDICINE

## 2024-09-26 PROCEDURE — 36415 COLL VENOUS BLD VENIPUNCTURE: CPT | Performed by: FAMILY MEDICINE

## 2024-09-26 PROCEDURE — 90662 IIV NO PRSV INCREASED AG IM: CPT | Performed by: FAMILY MEDICINE

## 2024-09-26 PROCEDURE — G0103 PSA SCREENING: HCPCS | Performed by: FAMILY MEDICINE

## 2024-09-26 PROCEDURE — G0439 PPPS, SUBSEQ VISIT: HCPCS | Performed by: FAMILY MEDICINE

## 2024-09-26 PROCEDURE — 80061 LIPID PANEL: CPT | Performed by: FAMILY MEDICINE

## 2024-09-26 PROCEDURE — 80048 BASIC METABOLIC PNL TOTAL CA: CPT | Performed by: FAMILY MEDICINE

## 2024-09-26 PROCEDURE — 99213 OFFICE O/P EST LOW 20 MIN: CPT | Mod: 25 | Performed by: FAMILY MEDICINE

## 2024-09-26 ASSESSMENT — PAIN SCALES - GENERAL: PAINLEVEL: NO PAIN (0)

## 2024-09-26 NOTE — PATIENT INSTRUCTIONS
Patient Education   Preventive Care Advice   This is general advice given by our system to help you stay healthy. However, your care team may have specific advice just for you. Please talk to your care team about your preventive care needs.  Nutrition  Eat 5 or more servings of fruits and vegetables each day.  Try wheat bread, brown rice and whole grain pasta (instead of white bread, rice, and pasta).  Get enough calcium and vitamin D. Check the label on foods and aim for 100% of the RDA (recommended daily allowance).  Lifestyle  Exercise at least 150 minutes each week  (30 minutes a day, 5 days a week).  Do muscle strengthening activities 2 days a week. These help control your weight and prevent disease.  No smoking.  Wear sunscreen to prevent skin cancer.  Have a dental exam and cleaning every 6 months.  Yearly exams  See your health care team every year to talk about:  Any changes in your health.  Any medicines your care team has prescribed.  Preventive care, family planning, and ways to prevent chronic diseases.  Shots (vaccines)   HPV shots (up to age 26), if you've never had them before.  Hepatitis B shots (up to age 59), if you've never had them before.  COVID-19 shot: Get this shot when it's due.  Flu shot: Get a flu shot every year.  Tetanus shot: Get a tetanus shot every 10 years.  Pneumococcal, hepatitis A, and RSV shots: Ask your care team if you need these based on your risk.  Shingles shot (for age 50 and up)  General health tests  Diabetes screening:  Starting at age 35, Get screened for diabetes at least every 3 years.  If you are younger than age 35, ask your care team if you should be screened for diabetes.  Cholesterol test: At age 39, start having a cholesterol test every 5 years, or more often if advised.  Bone density scan (DEXA): At age 50, ask your care team if you should have this scan for osteoporosis (brittle bones).  Hepatitis C: Get tested at least once in your life.  STIs (sexually  transmitted infections)  Before age 24: Ask your care team if you should be screened for STIs.  After age 24: Get screened for STIs if you're at risk. You are at risk for STIs (including HIV) if:  You are sexually active with more than one person.  You don't use condoms every time.  You or a partner was diagnosed with a sexually transmitted infection.  If you are at risk for HIV, ask about PrEP medicine to prevent HIV.  Get tested for HIV at least once in your life, whether you are at risk for HIV or not.  Cancer screening tests  Cervical cancer screening: If you have a cervix, begin getting regular cervical cancer screening tests starting at age 21.  Breast cancer scan (mammogram): If you've ever had breasts, begin having regular mammograms starting at age 40. This is a scan to check for breast cancer.  Colon cancer screening: It is important to start screening for colon cancer at age 45.  Have a colonoscopy test every 10 years (or more often if you're at risk) Or, ask your provider about stool tests like a FIT test every year or Cologuard test every 3 years.  To learn more about your testing options, visit:   .  For help making a decision, visit:   https://bit.ly/hy55160.  Prostate cancer screening test: If you have a prostate, ask your care team if a prostate cancer screening test (PSA) at age 55 is right for you.  Lung cancer screening: If you are a current or former smoker ages 50 to 80, ask your care team if ongoing lung cancer screenings are right for you.  For informational purposes only. Not to replace the advice of your health care provider. Copyright   2023 [employer]. All rights reserved. Clinically reviewed by the [facility] Transitions Program. [employer] 879371 - REV 01/24.

## 2024-09-26 NOTE — PROGRESS NOTES
Preventive Care Visit  [facility]  Pablo Benz MD, Family Medicine  Sep 26, 2024      ASSESSMENT / PLAN:  (Z00.00) Encounter for Medicare annual wellness exam  (primary encounter diagnosis)  Comment: generally healthy and normal exam. No falls/memory ok and good support system  Plan: await labs. vitaminD and exercise -add light weight lifting    (I10) Essential hypertension with goal blood pressure less than 140/90  Comment: stable  Plan: BASIC METABOLIC PANEL        Continue meds.     (E78.5) Hyperlipidemia LDL goal <130  Comment: off statin. Cardiology involved  Plan: Lipid panel reflex to direct LDL Non-fasting        Low carb diet/exercis.     (Z12.5) Screening PSA (prostate specific antigen)    Plan: Prostate Specific Antigen Screen           (I48.20) Chronic atrial fibrillation (H)  Comment: stable  Plan: per cardiology. Continue coumadin. Chest pain or shortness of breath to er.       Maury Barnes is a 69 year old, presenting for the following:  Wellness Visit  Follow-up htn, high cholesterol, cardiolmyopathy, ed and a.fib. seeing cardiology.   Tried colonoscopy but had syncopal episode- send to [facility] while at hospitalization -repeat 5 years. .  Permantent A.fib but heart overall doing ok. Summer - good for year.  Changed to coumadin because of cause and recently retired.  No nausea, vomiting or diarrhea or black/bloody stools.  No urine changes or hematuria. No std concerns.   Marriage going ok.   3 Grown kids, 5  grandkids. In [place].   Family is close. Wife health ok.   No chest pain or shortness of breath.  Exercise - needs a little more. But Active.barbells.  No mole changes or rashes.  No winter vacations.  No testicle pain/masses.   Emotionally doing ok. No falls.   Memory - short term not as good. Mild at this point. Wife not too concerned.  No headaches/no blurry vision.   Eye exam in winter. Dentist up to date.  Beer on weekends.  No cholesterol meds. Outside blood  pressure readings ok.       9/26/2024     9:28 AM   Additional Questions   Roomed by rcahael   Accompanied by self         9/26/2024     9:28 AM   Patient Reported Additional Medications   Patient reports taking the following new medications see chart         Health Care Directive  Patient does not have a Health Care Directive or Living Will: Discussed advance care planning with patient; information given to patient to review.        9/26/2024   General Health   How would you rate your overall physical health? Good   Feel stress (tense, anxious, or unable to sleep) Only a little      (!) STRESS CONCERN      9/26/2024   Nutrition   Diet: Regular (no restrictions)            9/26/2024   Exercise   Days per week of moderate/strenous exercise 1 day      (!) EXERCISE CONCERN      9/26/2024   Social Factors   Frequency of gathering with friends or relatives More than three times a week   Worry food won't last until get money to buy more No   Food not last or not have enough money for food? No   Do you have housing? (Housing is defined as stable permanent housing and does not include staying ouside in a car, in a tent, in an abandoned building, in an overnight shelter, or couch-surfing.) Yes   Are you worried about losing your housing? No   Lack of transportation? No   Unable to get utilities (heat,electricity)? No            9/26/2024   Fall Risk   Fallen 2 or more times in the past year? No   Trouble with walking or balance? No             9/26/2024   Activities of Daily Living- Home Safety   Needs help with the following daily activites None of the above   Safety concerns in the home None of the above            9/26/2024   Dental   Dentist two times every year? (!) NO            9/26/2024   Hearing Screening   Hearing concerns? None of the above            9/26/2024   Driving Risk Screening   Patient/family members have concerns about driving No            9/26/2024   General Alertness/Fatigue Screening   Have you been  more tired than usual lately? No            9/26/2024   Urinary Incontinence Screening   Bothered by leaking urine in past 6 months No            9/26/2024   TB Screening   Were you born outside of the [place]? No            Today's PHQ-2 Score:       9/26/2024     9:42 AM   PHQ-2 ( 1999 [employer])   Q1: Little interest or pleasure in doing things 0   Q2: Feeling down, depressed or hopeless 0   PHQ-2 Score 0   Q1: Little interest or pleasure in doing things Not at all   Q2: Feeling down, depressed or hopeless Not at all   PHQ-2 Score 0           9/26/2024   Substance Use   Alcohol more than 3/day or more than 7/wk No   Do you have a current opioid prescription? No   How severe/bad is pain from 1 to 10? 0/10 (No Pain)   Do you use any other substances recreationally? No        Social History     Tobacco Use    Smoking status: Never     Passive exposure: Never    Smokeless tobacco: Never   Vaping Use    Vaping status: Never Used   Substance Use Topics    Alcohol use: Not Currently    Drug use: Not Currently     Types: Marijuana           9/26/2024   AAA Screening   Family history of Abdominal Aortic Aneurysm (AAA)? No      Last PSA:   PSA   Date Value Ref Range Status   09/01/2020 0.92 0 - 4 ug/L Final     Comment:     Assay Method:  Chemiluminescence using Siemens Vista analyzer     Prostate Specific Antigen Screen   Date Value Ref Range Status   06/26/2023 1.01 0.00 - 4.50 ng/mL Final     ASCVD Risk   The 10-year ASCVD risk score (Marisa LOPEZ, et al., 2019) is: 17.9%    Values used to calculate the score:      Age: 69 years      Sex: Male      Is Non- : No      Diabetic: No      Tobacco smoker: No      Systolic Blood Pressure: 130 mmHg      Is BP treated: Yes      HDL Cholesterol: 63 mg/dL      Total Cholesterol: 225 mg/dL            Reviewed and updated as needed this visit by Provider                      Current providers sharing in care for this patient include:  Patient Care Team:  Demar  "Pablo Toledo MD as PCP - General (Family Practice)  Tami Calabrese MD as Assigned Heart and Vascular Provider  Pablo Benz MD as Assigned PCP    The following health maintenance items are reviewed in Epic and correct as of today:  Health Maintenance   Topic Date Due    ANNUAL REVIEW OF  ORDERS  Never done    ZOSTER IMMUNIZATION (1 of 2) Never done    RSV VACCINE (1 - Risk 60-74 years 1-dose series) Never done    DTAP/TDAP/TD IMMUNIZATION (2 - Td or Tdap) 09/24/2022    BMP  12/26/2023    LIPID  06/26/2024    INFLUENZA VACCINE (1) 09/01/2024    COVID-19 Vaccine (4 - 2024-25 season) 09/01/2024    MEDICARE ANNUAL WELLNESS VISIT  09/26/2025    FALL RISK ASSESSMENT  09/26/2025    GLUCOSE  06/26/2026    ADVANCE CARE PLANNING  06/26/2028    COLORECTAL CANCER SCREENING  02/28/2034    HEPATITIS C SCREENING  Completed    PHQ-2 (once per calendar year)  Completed    Pneumococcal Vaccine: 65+ Years  Completed    HPV IMMUNIZATION  Aged Out    MENINGITIS IMMUNIZATION  Aged Out    RSV MONOCLONAL ANTIBODY  Aged Out            Objective    Exam  /83   Pulse 92   Temp 97.5  F (36.4  C) (Tympanic)   Resp 20   Ht 1.778 m (5' 10\")   Wt 89.8 kg (198 lb)   SpO2 95%   BMI 28.41 kg/m     Estimated body mass index is 28.41 kg/m  as calculated from the following:    Height as of this encounter: 1.778 m (5' 10\").    Weight as of this encounter: 89.8 kg (198 lb).    Physical Exam  GENERAL: alert and no distress  EYES: Eyes grossly normal to inspection, PERRL and conjunctivae and sclerae normal  HENT: ear canals and TM's normal, nose and mouth without ulcers or lesions  NECK: no adenopathy, no asymmetry, masses, or scars  RESP: lungs clear to auscultation - no rales, rhonchi or wheezes  BREAST: normal without masses, tenderness or nipple discharge and no palpable axillary masses or adenopathy  CV: regular rate and rhythm, normal S1 S2, no S3 or S4, no murmur, click or rub, no peripheral edema   ABDOMEN: soft, nontender, no " hepatosplenomegaly, no masses and bowel sounds normal  MS: no gross musculoskeletal defects noted, no edema  SKIN: no suspicious lesions or rashes  NEURO: Normal strength and tone, mentation intact and speech normal  PSYCH: mentation appears normal, affect normal/bright        9/26/2024   Mini Cog   Clock Draw Score 2 Normal   3 Item Recall 2 objects recalled   Mini Cog Total Score 4                 Signed Electronically by: Pablo Benz MD

## 2024-10-11 ENCOUNTER — LAB (OUTPATIENT)
Dept: LAB | Facility: CLINIC | Age: 69
End: 2024-10-11
Payer: COMMERCIAL

## 2024-10-11 ENCOUNTER — ANTICOAGULATION THERAPY VISIT (OUTPATIENT)
Dept: ANTICOAGULATION | Facility: CLINIC | Age: 69
End: 2024-10-11

## 2024-10-11 DIAGNOSIS — I48.19 PERSISTENT ATRIAL FIBRILLATION (H): Primary | ICD-10-CM

## 2024-10-11 DIAGNOSIS — Z79.01 LONG TERM CURRENT USE OF ANTICOAGULANT THERAPY: ICD-10-CM

## 2024-10-11 DIAGNOSIS — I48.19 PERSISTENT ATRIAL FIBRILLATION (H): ICD-10-CM

## 2024-10-11 DIAGNOSIS — I48.20 CHRONIC ATRIAL FIBRILLATION (H): ICD-10-CM

## 2024-10-11 LAB — INR BLD: 3.4 (ref 0.9–1.1)

## 2024-10-11 PROCEDURE — 85610 PROTHROMBIN TIME: CPT

## 2024-10-11 PROCEDURE — 36416 COLLJ CAPILLARY BLOOD SPEC: CPT

## 2024-10-11 NOTE — PROGRESS NOTES
ANTICOAGULATION MANAGEMENT     Cameron Mariano 69 year old male is on warfarin with supratherapeutic INR result. (Goal INR 2.0-3.0)    Recent labs: (last 7 days)     10/11/24  0950   INR 3.4*       ASSESSMENT     Source(s): Chart Review and Patient/Caregiver Call     Warfarin doses taken: Warfarin taken as instructed  Diet: No new diet changes identified  Medication/supplement changes:  Patient started on a new supplement that helps with brain performance.   New illness, injury, or hospitalization: No  Signs or symptoms of bleeding or clotting: No  Previous result: Therapeutic last 2(+) visits  Additional findings: None       PLAN     Recommended plan for ongoing change(s) affecting INR     Dosing Instructions: decrease your warfarin dose (6.2% change) with next INR in 2 weeks       Summary  As of 10/11/2024      Full warfarin instructions:  7.5 mg every Fri; 5 mg all other days   Next INR check:  10/25/2024               Telephone call with Cameron who verbalizes understanding and agrees to plan    Lab visit scheduled    Education provided: Contact [phone] with any changes, questions or concerns.     Plan made per [facility] anticoagulation protocol    Mary Alice FARLEY RN  10/11/2024  Anticoagulation Clinic  [employer] for routing messages: george IZQUIERDO  [facility] patient phone line: [phone]        _______________________________________________________________________     Anticoagulation Episode Summary       Current INR goal:  2.0-3.0   TTR:  76.1% (1 y)   Target end date:  Indefinite   Send INR reminders to:  [facility] I[facility]    Indications    Persistent atrial fibrillation (H) [I48.19]  Chronic atrial fibrillation (H) [I48.20]  Long-term (current) use of anticoagulants [Z79.01] [Z79.01]             Comments:               Anticoagulation Care Providers       Provider Role Specialty Phone number    Tami Calabrese MD Referring Cardiovascular Disease [phone]    Pablo Benz MD Referring Family Medicine  [phone]

## 2024-10-11 NOTE — PROGRESS NOTES
ANTICOAGULATION MANAGEMENT     Cameron Mariano 69 year old male is on warfarin with supratherapeutic INR result. (Goal INR 2.0-3.0)    Recent labs: (last 7 days)     10/11/24  0950   INR 3.4*       ASSESSMENT     Source(s): Chart Review  Previous INR was Therapeutic last 2(+) visits  Spoke with pt before getting cut off, he reported started new supplements for brain health. Did not get names for them.          PLAN     Unable to reach pt when calling back    Left message to call back, or RN will try back again today.    Follow up required to discuss dosing instructions and confirm understanding of instructions    Selena Joseph RN  10/11/2024  Anticoagulation Clinic  [employer] for routing messages: george [facility]AN [facility]  [facility] patient phone line: [phone]

## 2024-10-25 ENCOUNTER — LAB (OUTPATIENT)
Dept: LAB | Facility: CLINIC | Age: 69
End: 2024-10-25
Payer: COMMERCIAL

## 2024-10-25 ENCOUNTER — ANTICOAGULATION THERAPY VISIT (OUTPATIENT)
Dept: ANTICOAGULATION | Facility: CLINIC | Age: 69
End: 2024-10-25

## 2024-10-25 DIAGNOSIS — Z79.01 LONG TERM CURRENT USE OF ANTICOAGULANT THERAPY: ICD-10-CM

## 2024-10-25 DIAGNOSIS — I48.20 CHRONIC ATRIAL FIBRILLATION (H): ICD-10-CM

## 2024-10-25 DIAGNOSIS — I48.19 PERSISTENT ATRIAL FIBRILLATION (H): ICD-10-CM

## 2024-10-25 DIAGNOSIS — I48.19 PERSISTENT ATRIAL FIBRILLATION (H): Primary | ICD-10-CM

## 2024-10-25 LAB — INR BLD: 2.7 (ref 0.9–1.1)

## 2024-10-25 PROCEDURE — 85610 PROTHROMBIN TIME: CPT

## 2024-10-25 PROCEDURE — 36416 COLLJ CAPILLARY BLOOD SPEC: CPT

## 2024-10-25 NOTE — PROGRESS NOTES
ANTICOAGULATION MANAGEMENT     Cameron Mariano 69 year old male is on warfarin with therapeutic INR result. (Goal INR 2.0-3.0)    Recent labs: (last 7 days)     10/25/24  1054   INR 2.7*       ASSESSMENT     Source(s): Chart Review and Patient/Caregiver Call     Warfarin doses taken: Warfarin taken as instructed  Diet: No new diet changes identified  Medication/supplement changes: None noted  New illness, injury, or hospitalization: No  Signs or symptoms of bleeding or clotting: No  Previous result: Supratherapeutic decreased by 6.2%  Additional findings: None       PLAN     Recommended plan for no diet, medication or health factor changes affecting INR     Dosing Instructions: Continue your current warfarin dose with next INR in 3 weeks       Summary  As of 10/25/2024      Full warfarin instructions:  7.5 mg every Fri; 5 mg all other days   Next INR check:  11/20/2024               Telephone call with Cameron who verbalizes understanding and agrees to plan    Lab visit scheduled    Education provided: Goal range and lab monitoring: goal range and significance of current result  Contact [phone] with any changes, questions or concerns.     Plan made per [facility] anticoagulation protocol    Karrie Stauffer RN  10/25/2024  Anticoagulation Clinic  [place] [employer] for routing messages: george IZQUIERDO  [facility] patient phone line: [phone]        _______________________________________________________________________     Anticoagulation Episode Summary       Current INR goal:  2.0-3.0   TTR:  75.5% (1 y)   Target end date:  Indefinite   Send INR reminders to:  CHAPO IZQUIERDO    Indications    Persistent atrial fibrillation (H) [I48.19]  Chronic atrial fibrillation (H) [I48.20]  Long-term (current) use of anticoagulants [Z79.01] [Z79.01]             Comments:  --             Anticoagulation Care Providers       Provider Role Specialty Phone number    Tami Calabrese MD Referring Cardiovascular Disease [phone]     Pablo Benz MD [facility] [phone]

## 2024-11-13 ENCOUNTER — ANTICOAGULATION THERAPY VISIT (OUTPATIENT)
Dept: ANTICOAGULATION | Facility: CLINIC | Age: 69
End: 2024-11-13

## 2024-11-13 ENCOUNTER — LAB (OUTPATIENT)
Dept: LAB | Facility: CLINIC | Age: 69
End: 2024-11-13
Payer: COMMERCIAL

## 2024-11-13 DIAGNOSIS — Z79.01 LONG TERM CURRENT USE OF ANTICOAGULANT THERAPY: ICD-10-CM

## 2024-11-13 DIAGNOSIS — I48.20 CHRONIC ATRIAL FIBRILLATION (H): ICD-10-CM

## 2024-11-13 DIAGNOSIS — I48.19 PERSISTENT ATRIAL FIBRILLATION (H): ICD-10-CM

## 2024-11-13 DIAGNOSIS — I48.19 PERSISTENT ATRIAL FIBRILLATION (H): Primary | ICD-10-CM

## 2024-11-13 LAB — INR BLD: 3.4 (ref 0.9–1.1)

## 2024-11-13 PROCEDURE — 85610 PROTHROMBIN TIME: CPT

## 2024-11-13 PROCEDURE — 36416 COLLJ CAPILLARY BLOOD SPEC: CPT

## 2024-11-13 NOTE — PROGRESS NOTES
ANTICOAGULATION MANAGEMENT     Cameron Mariano 69 year old male is on warfarin with supratherapeutic INR result. (Goal INR 2.0-3.0)    Recent labs: (last 7 days)     11/13/24  0800   INR 3.4*       ASSESSMENT     Source(s): Chart Review and Patient/Caregiver Call     Warfarin doses taken: Warfarin taken as instructed  Diet: No new diet changes identified  Medication/supplement changes: None noted Pt is going to stop the brain health supplement he started about a month ago and see if this makes a difference.   New illness, injury, or hospitalization: No  Signs or symptoms of bleeding or clotting: No  Previous result: Therapeutic last visit; previously outside of goal range  Additional findings: None       PLAN     Recommended plan for ongoing change(s) affecting INR     Dosing Instructions: partial hold then continue your current warfarin dose with next INR in 3 weeks       Summary  As of 11/13/2024      Full warfarin instructions:  11/14: 2.5 mg; Otherwise 7.5 mg every Fri; 5 mg all other days   Next INR check:  12/4/2024               Telephone call with Cameron who verbalizes understanding and agrees to plan and who agrees to plan and repeated back plan correctly    Lab visit scheduled    Education provided: None required    Plan made per [facility] anticoagulation protocol    Selena Joseph RN  11/13/2024  Anticoagulation Clinic  [employer] for routing messages: george IZQUIERDO  [facility] patient phone line: [phone]        _______________________________________________________________________     Anticoagulation Episode Summary       Current INR goal:  2.0-3.0   TTR:  73.7% (1 y)   Target end date:  Indefinite   Send INR reminders to:  CHAPO IZQUIERDO    Indications    Persistent atrial fibrillation (H) [I48.19]  Chronic atrial fibrillation (H) [I48.20]  Long-term (current) use of anticoagulants [Z79.01] [Z79.01]             Comments:  --             Anticoagulation Care Providers       Provider Role Specialty Phone  number    Bharati, Tami Macedo MD Referring Cardiovascular Disease [phone]    Pablo Benz MD Referring Family Medicine [phone]

## 2024-11-21 DIAGNOSIS — I10 HYPERTENSION GOAL BP (BLOOD PRESSURE) < 140/90: ICD-10-CM

## 2024-11-21 RX ORDER — DILTIAZEM HYDROCHLORIDE 240 MG/1
240 CAPSULE, COATED, EXTENDED RELEASE ORAL DAILY
Qty: 90 CAPSULE | Refills: 2 | Status: SHIPPED | OUTPATIENT
Start: 2024-11-21

## 2024-11-21 RX ORDER — LISINOPRIL 5 MG/1
5 TABLET ORAL DAILY
Qty: 90 TABLET | Refills: 2 | Status: SHIPPED | OUTPATIENT
Start: 2024-11-21

## 2024-12-04 ENCOUNTER — ANTICOAGULATION THERAPY VISIT (OUTPATIENT)
Dept: ANTICOAGULATION | Facility: CLINIC | Age: 69
End: 2024-12-04

## 2024-12-04 ENCOUNTER — LAB (OUTPATIENT)
Dept: LAB | Facility: CLINIC | Age: 69
End: 2024-12-04
Payer: COMMERCIAL

## 2024-12-04 DIAGNOSIS — Z79.01 LONG TERM CURRENT USE OF ANTICOAGULANT THERAPY: ICD-10-CM

## 2024-12-04 DIAGNOSIS — I48.20 CHRONIC ATRIAL FIBRILLATION (H): ICD-10-CM

## 2024-12-04 DIAGNOSIS — I48.19 PERSISTENT ATRIAL FIBRILLATION (H): Primary | ICD-10-CM

## 2024-12-04 DIAGNOSIS — I48.19 PERSISTENT ATRIAL FIBRILLATION (H): ICD-10-CM

## 2024-12-04 LAB — INR BLD: 2.7 (ref 0.9–1.1)

## 2024-12-04 PROCEDURE — 85610 PROTHROMBIN TIME: CPT

## 2024-12-04 PROCEDURE — 36416 COLLJ CAPILLARY BLOOD SPEC: CPT

## 2024-12-04 NOTE — PROGRESS NOTES
ANTICOAGULATION MANAGEMENT     Cameron Mariano 69 year old male is on warfarin with therapeutic INR result. (Goal INR 2.0-3.0)    Recent labs: (last 7 days)     12/04/24  0923   INR 2.7*       ASSESSMENT     Source(s): Chart Review and Patient/Caregiver Call     Warfarin doses taken: Warfarin taken as instructed  Diet: No new diet changes identified  Medication/supplement changes: None noted  New illness, injury, or hospitalization: No  Signs or symptoms of bleeding or clotting: No  Previous result: Supratherapeutic  Additional findings: None       PLAN     Recommended plan for no diet, medication or health factor changes affecting INR     Dosing Instructions: Continue your current warfarin dose with next INR in 4 weeks       Summary  As of 12/4/2024      Full warfarin instructions:  7.5 mg every Fri; 5 mg all other days   Next INR check:  12/31/2024               Telephone call with Cameron who verbalizes understanding and agrees to plan    Lab visit scheduled    Education provided: Contact [phone] with any changes, questions or concerns.     Plan made per [facility] anticoagulation protocol    Tina Swift RN  12/4/2024  Anticoagulation Clinic  [employer] for routing messages: george IZQUIERDO  [facility] patient phone line: [phone]        _______________________________________________________________________     Anticoagulation Episode Summary       Current INR goal:  2.0-3.0   TTR:  75.8% (1 y)   Target end date:  Indefinite   Send INR reminders to:  CHAPO IZQUIERDO    Indications    Persistent atrial fibrillation (H) [I48.19]  Chronic atrial fibrillation (H) [I48.20]  Long-term (current) use of anticoagulants [Z79.01] [Z79.01]             Comments:  --             Anticoagulation Care Providers       Provider Role Specialty Phone number    Tami Calabrese MD Referring Cardiovascular Disease [phone]    Pablo Benz MD Referring Family Medicine [phone]

## 2024-12-31 ENCOUNTER — LAB (OUTPATIENT)
Dept: LAB | Facility: CLINIC | Age: 69
End: 2024-12-31
Payer: COMMERCIAL

## 2024-12-31 ENCOUNTER — ANTICOAGULATION THERAPY VISIT (OUTPATIENT)
Dept: ANTICOAGULATION | Facility: CLINIC | Age: 69
End: 2024-12-31

## 2024-12-31 DIAGNOSIS — I48.19 PERSISTENT ATRIAL FIBRILLATION (H): Primary | ICD-10-CM

## 2024-12-31 DIAGNOSIS — I48.19 PERSISTENT ATRIAL FIBRILLATION (H): ICD-10-CM

## 2024-12-31 DIAGNOSIS — Z79.01 LONG TERM CURRENT USE OF ANTICOAGULANT THERAPY: ICD-10-CM

## 2024-12-31 DIAGNOSIS — I48.20 CHRONIC ATRIAL FIBRILLATION (H): ICD-10-CM

## 2024-12-31 LAB — INR BLD: 2.5 (ref 0.9–1.1)

## 2024-12-31 PROCEDURE — 85610 PROTHROMBIN TIME: CPT

## 2024-12-31 PROCEDURE — 36416 COLLJ CAPILLARY BLOOD SPEC: CPT

## 2024-12-31 NOTE — PROGRESS NOTES
ANTICOAGULATION MANAGEMENT     Cameron Mariano 69 year old male is on warfarin with therapeutic INR result. (Goal INR 2.0-3.0)    Recent labs: (last 7 days)     12/31/24  0905   INR 2.5*       ASSESSMENT     Source(s): Chart Review and Patient/Caregiver Call     Warfarin doses taken: Less warfarin taken than planned which may be affecting INR  Diet: No new diet changes identified  Medication/supplement changes: None noted  New illness, injury, or hospitalization: No  Signs or symptoms of bleeding or clotting: No  Previous result: Therapeutic last visit; previously outside of goal range  Additional findings: None       PLAN     Recommended plan for temporary change(s) affecting INR     Dosing Instructions: decrease your warfarin dose (6.7% change) with next INR in 5 weeks. Patient has been taking 35 mg weekly for the past 4 weeks. Writer adjusted maintenance dose.       Summary  As of 12/31/2024      Full warfarin instructions:  5 mg every day   Next INR check:  2/4/2025               Telephone call with Cameron who verbalizes understanding and agrees to plan    Lab visit scheduled    Education provided: Contact [phone] with any changes, questions or concerns.     Plan made per [facility] anticoagulation protocol    Mary Alice FARLEY RN  12/31/2024  Anticoagulation Clinic  [employer] for routing messages: george IZQUIERDO  [facility] patient phone line: [phone]        _______________________________________________________________________     Anticoagulation Episode Summary       Current INR goal:  2.0-3.0   TTR:  77.8% (1 y)   Target end date:  Indefinite   Send INR reminders to:  CHAPO IZQUIERDO    Indications    Persistent atrial fibrillation (H) [I48.19]  Chronic atrial fibrillation (H) [I48.20]  Long-term (current) use of anticoagulants [Z79.01] [Z79.01]             Comments:  --             Anticoagulation Care Providers       Provider Role Specialty Phone number    Tami Calabrese MD Referring Cardiovascular Disease  [phone]    Pablo Benz MD [facility] Medicine [phone]

## 2025-02-04 ENCOUNTER — ANTICOAGULATION THERAPY VISIT (OUTPATIENT)
Dept: ANTICOAGULATION | Facility: CLINIC | Age: 70
End: 2025-02-04

## 2025-02-04 ENCOUNTER — LAB (OUTPATIENT)
Dept: LAB | Facility: CLINIC | Age: 70
End: 2025-02-04
Payer: COMMERCIAL

## 2025-02-04 DIAGNOSIS — Z79.01 LONG TERM CURRENT USE OF ANTICOAGULANT THERAPY: ICD-10-CM

## 2025-02-04 DIAGNOSIS — I48.19 PERSISTENT ATRIAL FIBRILLATION (H): Primary | ICD-10-CM

## 2025-02-04 DIAGNOSIS — I48.19 PERSISTENT ATRIAL FIBRILLATION (H): ICD-10-CM

## 2025-02-04 DIAGNOSIS — I48.20 CHRONIC ATRIAL FIBRILLATION (H): ICD-10-CM

## 2025-02-04 LAB — INR BLD: 2.7 (ref 0.9–1.1)

## 2025-02-04 PROCEDURE — 85610 PROTHROMBIN TIME: CPT

## 2025-02-04 PROCEDURE — 36416 COLLJ CAPILLARY BLOOD SPEC: CPT

## 2025-02-04 NOTE — PROGRESS NOTES
ANTICOAGULATION MANAGEMENT     Cameron Mariano 69 year old male is on warfarin with therapeutic INR result. (Goal INR 2.0-3.0)    Recent labs: (last 7 days)     02/04/25  0826   INR 2.7*       ASSESSMENT     Source(s): Chart Review and Patient/Caregiver Call     Warfarin doses taken: Warfarin taken as instructed  Diet: No new diet changes identified  Medication/supplement changes: None noted  New illness, injury, or hospitalization: No  Signs or symptoms of bleeding or clotting: No  Previous result: Therapeutic last 2(+) visits  Additional findings: None       PLAN     Recommended plan for no diet, medication or health factor changes affecting INR     Dosing Instructions: Continue your current warfarin dose with next INR in 5 weeks       Summary  As of 2/4/2025      Full warfarin instructions:  5 mg every day   Next INR check:  3/4/2025               Telephone call with Cameron who verbalizes understanding and agrees to plan and who agrees to plan and repeated back plan correctly    Lab visit scheduled    Education provided: None required    Plan made per [facility] anticoagulation protocol    Selena Joseph RN  2/4/2025  Anticoagulation Clinic  [employer] for routing messages: george IZQUIERDO  [facility] patient phone line: [phone]        _______________________________________________________________________     Anticoagulation Episode Summary       Current INR goal:  2.0-3.0   TTR:  79.8% (1 y)   Target end date:  Indefinite   Send INR reminders to:  CHAPO IZQUIERDO    Indications    Persistent atrial fibrillation (H) [I48.19]  Chronic atrial fibrillation (H) [I48.20]  Long-term (current) use of anticoagulants [Z79.01] [Z79.01]             Comments:  --             Anticoagulation Care Providers       Provider Role Specialty Phone number    Tami Calabrese MD Referring Cardiovascular Disease [phone]    Pablo Benz MD Referring Family Medicine [phone]

## 2025-03-11 ENCOUNTER — ANTICOAGULATION THERAPY VISIT (OUTPATIENT)
Dept: ANTICOAGULATION | Facility: CLINIC | Age: 70
End: 2025-03-11

## 2025-03-11 ENCOUNTER — LAB (OUTPATIENT)
Dept: LAB | Facility: CLINIC | Age: 70
End: 2025-03-11
Payer: COMMERCIAL

## 2025-03-11 DIAGNOSIS — Z79.01 LONG TERM CURRENT USE OF ANTICOAGULANT THERAPY: ICD-10-CM

## 2025-03-11 DIAGNOSIS — I48.19 PERSISTENT ATRIAL FIBRILLATION (H): ICD-10-CM

## 2025-03-11 DIAGNOSIS — I48.19 PERSISTENT ATRIAL FIBRILLATION (H): Primary | ICD-10-CM

## 2025-03-11 DIAGNOSIS — I48.20 CHRONIC ATRIAL FIBRILLATION (H): ICD-10-CM

## 2025-03-11 LAB — INR BLD: 2.2 (ref 0.9–1.1)

## 2025-03-11 PROCEDURE — 85610 PROTHROMBIN TIME: CPT

## 2025-03-11 PROCEDURE — 36416 COLLJ CAPILLARY BLOOD SPEC: CPT

## 2025-03-11 NOTE — PROGRESS NOTES
ANTICOAGULATION MANAGEMENT     Cameron Mariano 69 year old male is on warfarin with therapeutic INR result. (Goal INR 2.0-3.0)    Recent labs: (last 7 days)     03/11/25  0819   INR 2.2*       ASSESSMENT     Source(s): Chart Review and Patient/Caregiver Call     Warfarin doses taken: Warfarin taken as instructed  Diet: No new diet changes identified  Medication/supplement changes: None noted  New illness, injury, or hospitalization: No  Signs or symptoms of bleeding or clotting: No  Previous result: Therapeutic last 2(+) visits  Additional findings: None       PLAN     Recommended plan for no diet, medication or health factor changes affecting INR     Dosing Instructions: Continue your current warfarin dose with next INR in 6 weeks       Summary  As of 3/11/2025      Full warfarin instructions:  5 mg every day   Next INR check:  4/22/2025               Telephone call with Cameron who verbalizes understanding and agrees to plan and who agrees to plan and repeated back plan correctly    Lab visit scheduled    Education provided: None required    Plan made per [facility] anticoagulation protocol    Selena Joseph RN  3/11/2025  Anticoagulation Clinic  [place] for routing messages: george IZQUIERDO  [facility] patient phone line: [phone]        _______________________________________________________________________     Anticoagulation Episode Summary       Current INR goal:  2.0-3.0   TTR:  80.7% (1 y)   Target end date:  Indefinite   Send INR reminders to:  CHAPO IZQUIERDO    Indications    Persistent atrial fibrillation (H) [I48.19]  Chronic atrial fibrillation (H) [I48.20]  Long-term (current) use of anticoagulants [Z79.01] [Z79.01]             Comments:  --             Anticoagulation Care Providers       Provider Role Specialty Phone number    Tami Calabrese MD Referring Cardiovascular Disease [phone]    Pablo Benz MD Referring Family Medicine [phone]

## 2025-03-24 DIAGNOSIS — I10 HYPERTENSION GOAL BP (BLOOD PRESSURE) < 140/90: ICD-10-CM

## 2025-03-25 RX ORDER — METOPROLOL SUCCINATE 100 MG/1
100 TABLET, EXTENDED RELEASE ORAL DAILY
Qty: 90 TABLET | Refills: 0 | Status: SHIPPED | OUTPATIENT
Start: 2025-03-25

## 2025-03-26 DIAGNOSIS — I48.19 PERSISTENT ATRIAL FIBRILLATION (H): Primary | ICD-10-CM

## 2025-03-26 DIAGNOSIS — I42.8 NONISCHEMIC CARDIOMYOPATHY (H): ICD-10-CM

## 2025-04-08 DIAGNOSIS — I48.19 PERSISTENT ATRIAL FIBRILLATION (H): ICD-10-CM

## 2025-04-09 RX ORDER — WARFARIN SODIUM 5 MG/1
TABLET ORAL
Qty: 90 TABLET | Refills: 1 | Status: SHIPPED | OUTPATIENT
Start: 2025-04-09

## 2025-04-09 NOTE — TELEPHONE ENCOUNTER
ANTICOAGULATION MANAGEMENT:  Medication Refill    Anticoagulation Summary  As of 3/11/2025      Warfarin maintenance plan:  5 mg (5 mg x 1) every day   Next INR check:  4/22/2025   Target end date:  Indefinite    Indications    Persistent atrial fibrillation (H) [I48.19]  Chronic atrial fibrillation (H) [I48.20]  Long-term (current) use of anticoagulants [Z79.01] [Z79.01]                 Anticoagulation Care Providers       Provider Role Specialty Phone number    Tami Calabrese MD Referring Cardiovascular Disease [phone]    Pablo Benz MD Referring Family Medicine [phone]            Refill Criteria    Visit with referring provider/group: Meets criteria: visit within referring provider group in the last 15 months on 9/26/24    [facility] referral last signed: 06/13/2024; within last year:  Yes    Lab monitoring is up to date (not exceeding 2 weeks overdue): Yes    Cameron meets all criteria for refill. Rx instructions and quantity supplied updated to match patient's current dosing plan. Warfarin 90 day supply with 1 refill granted per [facility] protocol     Tina Swift RN  Anticoagulation Clinic

## 2025-04-22 ENCOUNTER — ANTICOAGULATION THERAPY VISIT (OUTPATIENT)
Dept: ANTICOAGULATION | Facility: CLINIC | Age: 70
End: 2025-04-22

## 2025-04-22 ENCOUNTER — LAB (OUTPATIENT)
Dept: LAB | Facility: CLINIC | Age: 70
End: 2025-04-22
Payer: COMMERCIAL

## 2025-04-22 DIAGNOSIS — I48.19 PERSISTENT ATRIAL FIBRILLATION (H): ICD-10-CM

## 2025-04-22 DIAGNOSIS — Z79.01 LONG TERM CURRENT USE OF ANTICOAGULANT THERAPY: ICD-10-CM

## 2025-04-22 DIAGNOSIS — I48.19 PERSISTENT ATRIAL FIBRILLATION (H): Primary | ICD-10-CM

## 2025-04-22 DIAGNOSIS — I48.20 CHRONIC ATRIAL FIBRILLATION (H): ICD-10-CM

## 2025-04-22 LAB — INR BLD: 2.3 (ref 0.9–1.1)

## 2025-04-22 PROCEDURE — 36415 COLL VENOUS BLD VENIPUNCTURE: CPT

## 2025-04-22 PROCEDURE — 85610 PROTHROMBIN TIME: CPT

## 2025-04-22 NOTE — PROGRESS NOTES
ANTICOAGULATION MANAGEMENT     Cameron Mariano 69 year old male is on warfarin with therapeutic INR result. (Goal INR 2.0-3.0)    Recent labs: (last 7 days)     04/22/25  0804   INR 2.3*       ASSESSMENT     Source(s): Chart Review and Patient/Caregiver Call     Warfarin doses taken: Warfarin taken as instructed  Diet: No new diet changes identified  Medication/supplement changes: None noted  New illness, injury, or hospitalization: No  Signs or symptoms of bleeding or clotting: No  Previous result: Therapeutic last 2(+) visits  Additional findings: None       PLAN     Recommended plan for no diet, medication or health factor changes affecting INR     Dosing Instructions: Continue your current warfarin dose with next INR in 6 weeks       Summary  As of 4/22/2025      Full warfarin instructions:  5 mg every day   Next INR check:  6/3/2025               Telephone call with Cameron who verbalizes understanding and agrees to plan    Lab visit scheduled    Education provided: Please call back if any changes to your diet, medications or how you've been taking warfarin    Plan made per [facility] anticoagulation protocol    Galina Henderson RN  4/22/2025  Anticoagulation Clinic  [employer] for routing messages: george IZQUIERDO  [facility] patient phone line: [phone]        _______________________________________________________________________     Anticoagulation Episode Summary       Current INR goal:  2.0-3.0   TTR:  80.7% (1 y)   Target end date:  Indefinite   Send INR reminders to:  CHAPO IZQUIERDO    Indications    Persistent atrial fibrillation (H) [I48.19]  Chronic atrial fibrillation (H) [I48.20]  Long-term (current) use of anticoagulants [Z79.01] [Z79.01]             Comments:  --             Anticoagulation Care Providers       Provider Role Specialty Phone number    Tami Calabrese MD Referring Cardiovascular Disease [phone]    Pablo Benz MD Referring Family Medicine [phone]

## 2025-06-03 ENCOUNTER — ANTICOAGULATION THERAPY VISIT (OUTPATIENT)
Dept: ANTICOAGULATION | Facility: CLINIC | Age: 70
End: 2025-06-03

## 2025-06-03 ENCOUNTER — TELEPHONE (OUTPATIENT)
Dept: ANTICOAGULATION | Facility: CLINIC | Age: 70
End: 2025-06-03

## 2025-06-03 ENCOUNTER — LAB (OUTPATIENT)
Dept: LAB | Facility: CLINIC | Age: 70
End: 2025-06-03
Payer: COMMERCIAL

## 2025-06-03 DIAGNOSIS — I48.20 CHRONIC ATRIAL FIBRILLATION (H): ICD-10-CM

## 2025-06-03 DIAGNOSIS — Z79.01 LONG TERM CURRENT USE OF ANTICOAGULANT THERAPY: ICD-10-CM

## 2025-06-03 DIAGNOSIS — I48.19 PERSISTENT ATRIAL FIBRILLATION (H): ICD-10-CM

## 2025-06-03 DIAGNOSIS — I48.19 PERSISTENT ATRIAL FIBRILLATION (H): Primary | ICD-10-CM

## 2025-06-03 DIAGNOSIS — Z79.01 LONG TERM CURRENT USE OF ANTICOAGULANT THERAPY: Primary | ICD-10-CM

## 2025-06-03 LAB — INR BLD: 2.4 (ref 0.9–1.1)

## 2025-06-03 PROCEDURE — 36416 COLLJ CAPILLARY BLOOD SPEC: CPT

## 2025-06-03 PROCEDURE — 85610 PROTHROMBIN TIME: CPT

## 2025-06-03 NOTE — PROGRESS NOTES
ANTICOAGULATION MANAGEMENT     Cameron Mariano 69 year old male is on warfarin with therapeutic INR result. (Goal INR 2.0-3.0)    Recent labs: (last 7 days)     06/03/25  0837   INR 2.4*       ASSESSMENT     Source(s): Chart Review and Patient/Caregiver Call     Warfarin doses taken: Warfarin taken as instructed  Diet: No new diet changes identified  Medication/supplement changes: None noted  New illness, injury, or hospitalization: No  Signs or symptoms of bleeding or clotting: No  Previous result: Therapeutic last 2(+) visits  Additional findings: None       PLAN     Recommended plan for no diet, medication or health factor changes affecting INR     Dosing Instructions: Continue your current warfarin dose with next INR in 6 weeks       Summary  As of 6/3/2025      Full warfarin instructions:  5 mg every day   Next INR check:  7/15/2025               Telephone call with Cameron who verbalizes understanding and agrees to plan    Lab visit scheduled    Education provided: Please call back if any changes to your diet, medications or how you've been taking warfarin    Plan made per [facility] anticoagulation protocol    Galina Henderson RN  6/3/2025  Anticoagulation Clinic  [place] for routing messages: george IZQUIERDO  [facility] patient phone line: [phone]        _______________________________________________________________________     Anticoagulation Episode Summary       Current INR goal:  2.0-3.0   TTR:  80.7% (1 y)   Target end date:  Indefinite   Send INR reminders to:  CHAPO IZQUIERDO    Indications    Persistent atrial fibrillation (H) [I48.19]  Chronic atrial fibrillation (H) [I48.20]  Long-term (current) use of anticoagulants [Z79.01] [Z79.01]             Comments:  --             Anticoagulation Care Providers       Provider Role Specialty Phone number    Tami Calabrese MD Referring Cardiovascular Disease [phone]    Pablo Benz MD Referring Family Medicine [phone]

## 2025-06-03 NOTE — TELEPHONE ENCOUNTER
ANTICOAGULATION CLINIC REFERRAL RENEWAL REQUEST       An annual renewal order is required for all patients referred to [facility]ticoagulation [facility].?  Please review and sign the pended referral order for Cameron Mariano.       ANTICOAGULATION SUMMARY      Warfarin indication(s)   Atrial Fibrillation    Mechanical heart valve present?  NO       Current goal range   INR: 2.0-3.0     Goal appropriate for indication? Goal INR 2-3, standard for indication(s) above     Time in Therapeutic Range (TTR)  (Goal > 60%) 80.7%       Office visit with referring provider's group within last year yes on 9/26/2024       Galina Henderson RN  [facility] Anticoagulation Clinic

## 2025-07-15 ENCOUNTER — TELEPHONE (OUTPATIENT)
Dept: FAMILY MEDICINE | Facility: CLINIC | Age: 70
End: 2025-07-15

## 2025-07-15 ENCOUNTER — LAB (OUTPATIENT)
Dept: LAB | Facility: CLINIC | Age: 70
End: 2025-07-15
Payer: COMMERCIAL

## 2025-07-15 ENCOUNTER — ANTICOAGULATION THERAPY VISIT (OUTPATIENT)
Dept: ANTICOAGULATION | Facility: CLINIC | Age: 70
End: 2025-07-15

## 2025-07-15 DIAGNOSIS — Z79.01 LONG TERM CURRENT USE OF ANTICOAGULANT THERAPY: ICD-10-CM

## 2025-07-15 DIAGNOSIS — I48.20 CHRONIC ATRIAL FIBRILLATION (H): ICD-10-CM

## 2025-07-15 DIAGNOSIS — I48.19 PERSISTENT ATRIAL FIBRILLATION (H): Primary | ICD-10-CM

## 2025-07-15 LAB — INR BLD: 2.7 (ref 0.9–1.1)

## 2025-07-15 PROCEDURE — 85610 PROTHROMBIN TIME: CPT

## 2025-07-15 PROCEDURE — 36416 COLLJ CAPILLARY BLOOD SPEC: CPT

## 2025-07-15 NOTE — PROGRESS NOTES
ANTICOAGULATION MANAGEMENT     Cameron Mariano 69 year old male is on warfarin with therapeutic INR result. (Goal INR 2.0-3.0)    Recent labs: (last 7 days)     07/15/25  0815   INR 2.7*       ASSESSMENT     Source(s): Chart Review  Previous INR was Therapeutic last 2(+) visits  Medication, diet, health changes since last INR: chart reviewed; none identified         PLAN     Recommended plan for no diet, medication or health factor changes affecting INR     Dosing Instructions: Continue your current warfarin dose with next INR in 5 weeks       Summary  As of 7/15/2025      Full warfarin instructions:  5 mg every day   Next INR check:  8/26/2025               Detailed voice message left for Cameron with dosing instructions and follow up date.         Education provided: Please call back if any changes to your diet, medications or how you've been taking warfarin    Plan made per [facility] anticoagulation protocol    Galina Henderson RN  7/15/2025  Anticoagulation Clinic  [employer] for routing messages: george IZQUIERDO  [facility] patient phone line: [phone]        _______________________________________________________________________     Anticoagulation Episode Summary       Current INR goal:  2.0-3.0   TTR:  80.9% (1 y)   Target end date:  Indefinite   Send INR reminders to:  CHAPO IZQUIERDO    Indications    Persistent atrial fibrillation (H) [I48.19]  Chronic atrial fibrillation (H) [I48.20]  Long-term (current) use of anticoagulants [Z79.01] [Z79.01]             Comments:  --             Anticoagulation Care Providers       Provider Role Specialty Phone number    Tami Calabrese MD Referring Cardiovascular Disease [phone]    Pablo Benz MD Referring Family Medicine [phone]

## 2025-08-04 ENCOUNTER — OFFICE VISIT (OUTPATIENT)
Dept: CARDIOLOGY | Facility: CLINIC | Age: 70
End: 2025-08-04
Payer: COMMERCIAL

## 2025-08-04 VITALS
OXYGEN SATURATION: 98 % | DIASTOLIC BLOOD PRESSURE: 71 MMHG | WEIGHT: 201 LBS | HEART RATE: 60 BPM | BODY MASS INDEX: 28.84 KG/M2 | SYSTOLIC BLOOD PRESSURE: 110 MMHG

## 2025-08-04 DIAGNOSIS — I48.21 PERMANENT ATRIAL FIBRILLATION (H): Primary | ICD-10-CM

## 2025-08-04 DIAGNOSIS — I42.8 NONISCHEMIC CARDIOMYOPATHY (H): ICD-10-CM

## 2025-08-04 DIAGNOSIS — I48.19 PERSISTENT ATRIAL FIBRILLATION (H): ICD-10-CM

## 2025-08-19 ENCOUNTER — LAB (OUTPATIENT)
Dept: LAB | Facility: CLINIC | Age: 70
End: 2025-08-19
Payer: COMMERCIAL

## 2025-08-19 ENCOUNTER — ANTICOAGULATION THERAPY VISIT (OUTPATIENT)
Dept: ANTICOAGULATION | Facility: CLINIC | Age: 70
End: 2025-08-19

## 2025-08-19 ENCOUNTER — RESULTS FOLLOW-UP (OUTPATIENT)
Dept: ANTICOAGULATION | Facility: CLINIC | Age: 70
End: 2025-08-19

## 2025-08-19 DIAGNOSIS — Z79.01 LONG TERM CURRENT USE OF ANTICOAGULANT THERAPY: ICD-10-CM

## 2025-08-19 DIAGNOSIS — I48.20 CHRONIC ATRIAL FIBRILLATION (H): ICD-10-CM

## 2025-08-19 DIAGNOSIS — I48.19 PERSISTENT ATRIAL FIBRILLATION (H): Primary | ICD-10-CM

## 2025-08-19 LAB — INR BLD: 2.3 (ref 0.9–1.1)

## 2025-08-19 PROCEDURE — 85610 PROTHROMBIN TIME: CPT

## 2025-08-19 PROCEDURE — 36416 COLLJ CAPILLARY BLOOD SPEC: CPT

## 2025-08-25 DIAGNOSIS — I10 HYPERTENSION GOAL BP (BLOOD PRESSURE) < 140/90: ICD-10-CM

## 2025-08-25 RX ORDER — METOPROLOL SUCCINATE 100 MG/1
100 TABLET, EXTENDED RELEASE ORAL DAILY
Qty: 90 TABLET | Refills: 0 | Status: SHIPPED | OUTPATIENT
Start: 2025-08-25

## 2025-08-26 ENCOUNTER — ANCILLARY PROCEDURE (OUTPATIENT)
Dept: CARDIOLOGY | Facility: CLINIC | Age: 70
End: 2025-08-26
Attending: INTERNAL MEDICINE
Payer: COMMERCIAL

## 2025-08-26 DIAGNOSIS — I48.19 PERSISTENT ATRIAL FIBRILLATION (H): ICD-10-CM

## 2025-08-26 DIAGNOSIS — I10 HYPERTENSION GOAL BP (BLOOD PRESSURE) < 140/90: ICD-10-CM

## 2025-08-26 DIAGNOSIS — I42.8 NONISCHEMIC CARDIOMYOPATHY (H): ICD-10-CM

## 2025-08-26 LAB
BI-PLANE LVEF ECHO: NORMAL
LVEF ECHO: NORMAL

## 2025-08-26 PROCEDURE — 93306 TTE W/DOPPLER COMPLETE: CPT | Performed by: INTERNAL MEDICINE

## 2025-08-26 RX ORDER — LISINOPRIL 5 MG/1
5 TABLET ORAL DAILY
Qty: 90 TABLET | Refills: 0 | Status: SHIPPED | OUTPATIENT
Start: 2025-08-26

## 2025-08-26 RX ORDER — DILTIAZEM HYDROCHLORIDE 240 MG/1
240 CAPSULE, EXTENDED RELEASE ORAL DAILY
Qty: 90 CAPSULE | Refills: 0 | Status: SHIPPED | OUTPATIENT
Start: 2025-08-26

## 2025-08-27 ENCOUNTER — PATIENT OUTREACH (OUTPATIENT)
Dept: CARE COORDINATION | Facility: CLINIC | Age: 70
End: 2025-08-27
Payer: COMMERCIAL

## (undated) DEVICE — NDL TRANSSEPTAL BRK XS 18GA 71CM BRK-1 CVD G407209

## (undated) DEVICE — INTRO SHEATH MICRO PLATINUM TIP 4FRX40CM 7274

## (undated) DEVICE — INTRODUCER SHEATH FAST-CATH CATH-LOCK 6FRX12CM 406701

## (undated) DEVICE — INTRODUCER SHEATH FAST-CATH 9FRX12CM 406116

## (undated) DEVICE — PATCH CARTO 3 EXTERNAL REFERENCE 3D MAPPING CREFP6

## (undated) DEVICE — TUBE SET SMARKABLATE IRRIGATION

## (undated) DEVICE — INTRODUCER SHEATH FAST-CATH SWARTZ 8.5FRX63CM SL1 CVD 406849

## (undated) DEVICE — INTRO CATH 12CM 8.5FR FST-CATH

## (undated) DEVICE — CATH NAV PENTARAY F CURVE

## (undated) DEVICE — CATH SOUNDSTAR 8FRX90CM 10439011

## (undated) DEVICE — PAD DEFIBRILLATOR ELECTRODE 4.25INX6IN ADULT 2001M-C

## (undated) DEVICE — INTRODUCER SHEATH FAST-CATH CATH-LOCK 7FRX12CM 406702

## (undated) DEVICE — REPRO BARD EP XT DECAPL STRBL DX EP CATH 6F

## (undated) DEVICE — PACK HEART LEFT CUSTOM

## (undated) DEVICE — KIT VENOUS FLUSH 60210177

## (undated) DEVICE — CATH EP MAP STR OD8FR 125CM 25

## (undated) DEVICE — CATH THERMOCOOL SMARTTOUCH SF FJ CURVE

## (undated) DEVICE — CATH EP REPRO DAIG SPRM FX CRV DX EP C

## (undated) RX ORDER — LIDOCAINE HYDROCHLORIDE 20 MG/ML
INJECTION, SOLUTION EPIDURAL; INFILTRATION; INTRACAUDAL; PERINEURAL
Status: DISPENSED
Start: 2021-03-12

## (undated) RX ORDER — EPHEDRINE SULFATE 50 MG/ML
INJECTION, SOLUTION INTRAMUSCULAR; INTRAVENOUS; SUBCUTANEOUS
Status: DISPENSED
Start: 2021-03-12

## (undated) RX ORDER — HEPARIN SODIUM 1000 [USP'U]/ML
INJECTION, SOLUTION INTRAVENOUS; SUBCUTANEOUS
Status: DISPENSED
Start: 2021-03-12

## (undated) RX ORDER — ONDANSETRON 2 MG/ML
INJECTION INTRAMUSCULAR; INTRAVENOUS
Status: DISPENSED
Start: 2021-03-12

## (undated) RX ORDER — FENTANYL CITRATE-0.9 % NACL/PF 10 MCG/ML
PLASTIC BAG, INJECTION (ML) INTRAVENOUS
Status: DISPENSED
Start: 2021-03-12

## (undated) RX ORDER — FENTANYL CITRATE 50 UG/ML
INJECTION, SOLUTION INTRAMUSCULAR; INTRAVENOUS
Status: DISPENSED
Start: 2021-03-11

## (undated) RX ORDER — PROPOFOL 10 MG/ML
INJECTION, EMULSION INTRAVENOUS
Status: DISPENSED
Start: 2021-03-12

## (undated) RX ORDER — HEPARIN SODIUM 10000 [USP'U]/100ML
INJECTION, SOLUTION INTRAVENOUS
Status: DISPENSED
Start: 2021-03-12

## (undated) RX ORDER — LIDOCAINE HYDROCHLORIDE 20 MG/ML
SOLUTION OROPHARYNGEAL
Status: DISPENSED
Start: 2021-03-11

## (undated) RX ORDER — PROTAMINE SULFATE 10 MG/ML
INJECTION, SOLUTION INTRAVENOUS
Status: DISPENSED
Start: 2021-03-12